# Patient Record
Sex: FEMALE | Race: WHITE | NOT HISPANIC OR LATINO | Employment: OTHER | URBAN - METROPOLITAN AREA
[De-identification: names, ages, dates, MRNs, and addresses within clinical notes are randomized per-mention and may not be internally consistent; named-entity substitution may affect disease eponyms.]

---

## 2017-03-21 ENCOUNTER — HOSPITAL ENCOUNTER (OUTPATIENT)
Dept: RADIOLOGY | Facility: HOSPITAL | Age: 62
Discharge: HOME | End: 2017-03-21
Attending: OBSTETRICS & GYNECOLOGY
Payer: COMMERCIAL

## 2017-03-21 DIAGNOSIS — Z12.31 SCREENING MAMMOGRAM, ENCOUNTER FOR: ICD-10-CM

## 2017-03-21 PROCEDURE — 77067 SCR MAMMO BI INCL CAD: CPT | Mod: TC

## 2017-04-05 ENCOUNTER — TELEPHONE (OUTPATIENT)
Dept: GASTROENTEROLOGY | Facility: CLINIC | Age: 62
End: 2017-04-05

## 2017-04-05 NOTE — TELEPHONE ENCOUNTER
Spoke with patient today by phone.  Overall, she feels well, but is still concerned about the flat, pencil thin stools that she has had for the past few weeks.  Last colonoscopy occurred in 2014, showing only diverticulosis as the main finding.    The patient reports no change in medications, but does indicate that she has altered her diet somewhat because of Lent.  She continues to have her normal bowel pattern of 2 bowel movements every morning.  They're not significantly hard to pass, but are different shape as noted above.  There is no associated bright red blood per rectum or melena.  She still has occasional left lower quadrant discomfort, which is well controlled with when necessary use of dicyclomine.    Recommended that the patient try Benefiber daily in association with the MiraLAX she is already on for bulking of the stool, and to see if that will alter the stool pattern.  If the symptoms persists beyond Lent, recommend that she follow up with me, and we'll discuss colonoscopic evaluation at that time.    Yunier Bravo MD, FACP, FACG, AGAF [facility]  Cell: [phone]  [address], [address], [place] [address]  Phone: [phone] Fax: [phone] [address], [address], [place] [address]  Phone: [phone] Fax: [phone]

## 2017-05-04 DIAGNOSIS — Z00.00 ROUTINE GENERAL MEDICAL EXAMINATION AT A HEALTH CARE FACILITY: Primary | ICD-10-CM

## 2017-07-10 ENCOUNTER — PATIENT MESSAGE (OUTPATIENT)
Dept: SURGERY | Facility: CLINIC | Age: 62
End: 2017-07-10

## 2017-07-11 DIAGNOSIS — J01.00 ACUTE MAXILLARY SINUSITIS, RECURRENCE NOT SPECIFIED: ICD-10-CM

## 2017-07-11 RX ORDER — AMOXICILLIN AND CLAVULANATE POTASSIUM 875; 125 MG/1; MG/1
1 TABLET, FILM COATED ORAL EVERY 12 HOURS
Qty: 20 TABLET | Refills: 0 | Status: SHIPPED | OUTPATIENT
Start: 2017-07-11 | End: 2018-08-16

## 2017-07-27 ENCOUNTER — HOSPITAL ENCOUNTER (OUTPATIENT)
Dept: RADIOLOGY | Facility: HOSPITAL | Age: 62
Discharge: HOME | End: 2017-07-27
Attending: NURSE PRACTITIONER
Payer: COMMERCIAL

## 2017-07-27 ENCOUNTER — TELEPHONE (OUTPATIENT)
Dept: FAMILY MEDICINE | Facility: CLINIC | Age: 62
End: 2017-07-27

## 2017-07-27 ENCOUNTER — OFFICE VISIT (OUTPATIENT)
Dept: FAMILY MEDICINE | Facility: CLINIC | Age: 62
End: 2017-07-27
Payer: COMMERCIAL

## 2017-07-27 VITALS
HEIGHT: 62 IN | DIASTOLIC BLOOD PRESSURE: 70 MMHG | TEMPERATURE: 98 F | WEIGHT: 140.88 LBS | SYSTOLIC BLOOD PRESSURE: 118 MMHG | BODY MASS INDEX: 25.92 KG/M2 | HEART RATE: 81 BPM | OXYGEN SATURATION: 99 %

## 2017-07-27 DIAGNOSIS — M79.89 PAIN AND SWELLING OF LEFT LOWER LEG: ICD-10-CM

## 2017-07-27 DIAGNOSIS — M79.89 PAIN AND SWELLING OF LEFT LOWER LEG: Primary | ICD-10-CM

## 2017-07-27 DIAGNOSIS — M79.662 PAIN AND SWELLING OF LEFT LOWER LEG: ICD-10-CM

## 2017-07-27 DIAGNOSIS — M79.662 PAIN AND SWELLING OF LEFT LOWER LEG: Primary | ICD-10-CM

## 2017-07-27 PROCEDURE — 99213 OFFICE O/P EST LOW 20 MIN: CPT | Mod: S$GLB,,, | Performed by: NURSE PRACTITIONER

## 2017-07-27 PROCEDURE — 93971 EXTREMITY STUDY: CPT | Mod: TC,PO

## 2017-07-27 RX ORDER — HYDROCHLOROTHIAZIDE 25 MG/1
25 TABLET ORAL DAILY
Qty: 30 TABLET | Refills: 0 | Status: SHIPPED | OUTPATIENT
Start: 2017-07-27 | End: 2017-08-24 | Stop reason: SDUPTHER

## 2017-07-27 NOTE — TELEPHONE ENCOUNTER
Spoke with patient let her know US negative for DVT sending HCTZ to the pharmacy take for 5 days elevate low salt

## 2017-07-27 NOTE — PROGRESS NOTES
Subjective:       Patient ID: Mone Best is a 61 y.o. female.    Chief Complaint: Leg Swelling (left leg swelling and heaviness)    Edema   This is a new problem. 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 The problem occurs constantly. The problem has been unchanged. Pertinent negatives include no abdominal pain, anorexia, arthralgias, change in bowel habit, chest pain, chills, congestion, coughing, diaphoresis, fatigue, fever, headaches, joint swelling, myalgias, nausea, neck pain, numbness, rash, sore throat, swollen glands, urinary symptoms, vertigo, visual change, vomiting or weakness. Exacerbated by: heaviness, warmth. She has tried nothing for the symptoms.     Review of Systems   Constitutional: Negative for chills, diaphoresis, fatigue and fever.   HENT: Negative for congestion and sore throat.    Eyes: Negative for photophobia and visual disturbance.   Respiratory: Negative for cough, chest tightness, shortness of breath and wheezing.    Cardiovascular: Positive for leg swelling. Negative for chest pain and palpitations.        Heaviness left lower leg     Gastrointestinal: Negative for abdominal pain, anorexia, change in bowel habit, nausea and vomiting.   Musculoskeletal: Negative for arthralgias, joint swelling, myalgias and neck pain.   Skin: Negative for rash.   Neurological: Negative for vertigo, weakness, numbness and headaches.       Objective:      Physical Exam   Constitutional: She appears well-developed and well-nourished. No distress.   HENT:   Right Ear: External ear normal.   Left Ear: External ear normal.   Cardiovascular: Normal rate, regular rhythm and normal heart sounds.    Pulmonary/Chest: Effort normal and breath sounds normal. No respiratory distress.   Musculoskeletal: She exhibits edema and tenderness. She exhibits no deformity.        Left lower leg: She exhibits tenderness and swelling.        Left  foot: There is swelling.   + homans sign left side, mild erythema, mild warmth     Skin: Skin is warm and dry. No rash noted. She is not diaphoretic. No erythema. No pallor.   Psychiatric: She has a normal mood and affect. Her behavior is normal. Judgment and thought content normal.   Vitals reviewed.      Assessment:       1. Pain and swelling of left lower leg        Plan:       Pain and swelling of left lower leg  -     US Lower Extremity Veins Left; Future      Ultrasound scheduled for today at 345

## 2017-08-16 ENCOUNTER — CLINICAL SUPPORT (OUTPATIENT)
Dept: INTERNAL MEDICINE | Facility: CLINIC | Age: 62
End: 2017-08-16
Payer: COMMERCIAL

## 2017-08-16 ENCOUNTER — OFFICE VISIT (OUTPATIENT)
Dept: INTERNAL MEDICINE | Facility: CLINIC | Age: 62
End: 2017-08-16
Payer: COMMERCIAL

## 2017-08-16 ENCOUNTER — HOSPITAL ENCOUNTER (OUTPATIENT)
Dept: CARDIOLOGY | Facility: CLINIC | Age: 62
Discharge: HOME | End: 2017-08-16
Payer: COMMERCIAL

## 2017-08-16 DIAGNOSIS — Z00.00 ROUTINE GENERAL MEDICAL EXAMINATION AT A HEALTH CARE FACILITY: Primary | ICD-10-CM

## 2017-08-16 DIAGNOSIS — Z00.00 ROUTINE GENERAL MEDICAL EXAMINATION AT A HEALTH CARE FACILITY: ICD-10-CM

## 2017-08-16 LAB
ALBUMIN SERPL BCP-MCNC: 3.8 G/DL
ALP SERPL-CCNC: 43 U/L
ALT SERPL W/O P-5'-P-CCNC: 9 U/L
ANION GAP SERPL CALC-SCNC: 6 MMOL/L
AST SERPL-CCNC: 9 U/L
BILIRUB SERPL-MCNC: 0.5 MG/DL
BUN SERPL-MCNC: 14 MG/DL
CALCIUM SERPL-MCNC: 9.4 MG/DL
CHLORIDE SERPL-SCNC: 107 MMOL/L
CHOLEST/HDLC SERPL: 4.1 {RATIO}
CO2 SERPL-SCNC: 30 MMOL/L
CREAT SERPL-MCNC: 0.8 MG/DL
ERYTHROCYTE [DISTWIDTH] IN BLOOD BY AUTOMATED COUNT: 13 %
EST. GFR  (AFRICAN AMERICAN): >60 ML/MIN/1.73 M^2
EST. GFR  (NON AFRICAN AMERICAN): >60 ML/MIN/1.73 M^2
ESTIMATED AVG GLUCOSE: 91 MG/DL
GLUCOSE SERPL-MCNC: 98 MG/DL
HBA1C MFR BLD HPLC: 4.8 %
HCT VFR BLD AUTO: 40.2 %
HDL/CHOLESTEROL RATIO: 24.5 %
HDLC SERPL-MCNC: 188 MG/DL
HDLC SERPL-MCNC: 46 MG/DL
HGB BLD-MCNC: 13.8 G/DL
LDLC SERPL CALC-MCNC: 115.6 MG/DL
MCH RBC QN AUTO: 29.5 PG
MCHC RBC AUTO-ENTMCNC: 34.3 G/DL
MCV RBC AUTO: 86 FL
NONHDLC SERPL-MCNC: 142 MG/DL
PLATELET # BLD AUTO: 272 K/UL
PMV BLD AUTO: 9.4 FL
POTASSIUM SERPL-SCNC: 4.5 MMOL/L
PROT SERPL-MCNC: 6.7 G/DL
RBC # BLD AUTO: 4.68 M/UL
SODIUM SERPL-SCNC: 143 MMOL/L
TRIGL SERPL-MCNC: 132 MG/DL
TSH SERPL DL<=0.005 MIU/L-ACNC: 2.67 UIU/ML
WBC # BLD AUTO: 7.72 K/UL

## 2017-08-16 PROCEDURE — 93000 ELECTROCARDIOGRAM COMPLETE: CPT | Mod: S$GLB,,, | Performed by: INTERNAL MEDICINE

## 2017-08-16 PROCEDURE — 85027 COMPLETE CBC AUTOMATED: CPT

## 2017-08-16 PROCEDURE — 80053 COMPREHEN METABOLIC PANEL: CPT

## 2017-08-16 PROCEDURE — 83036 HEMOGLOBIN GLYCOSYLATED A1C: CPT

## 2017-08-16 PROCEDURE — 99396 PREV VISIT EST AGE 40-64: CPT | Mod: S$GLB,,, | Performed by: INTERNAL MEDICINE

## 2017-08-16 PROCEDURE — 36415 COLL VENOUS BLD VENIPUNCTURE: CPT

## 2017-08-16 PROCEDURE — 80061 LIPID PANEL: CPT

## 2017-08-16 PROCEDURE — 97750 PHYSICAL PERFORMANCE TEST: CPT | Mod: S$GLB,,, | Performed by: INTERNAL MEDICINE

## 2017-08-16 PROCEDURE — 97802 MEDICAL NUTRITION INDIV IN: CPT | Mod: S$GLB,,, | Performed by: INTERNAL MEDICINE

## 2017-08-16 PROCEDURE — 84443 ASSAY THYROID STIM HORMONE: CPT

## 2017-08-16 PROCEDURE — 99999 PR PBB SHADOW E&M-EST. PATIENT-LVL II: CPT | Mod: PBBFAC,,, | Performed by: INTERNAL MEDICINE

## 2017-08-16 NOTE — PROGRESS NOTES
Nutrition Assessment  Client name:  Mone Best  :  1955  Age:  61 y.o.  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 Additionally last month she was on vacation in [place] and recently on 3 day retreat with service of 3 meals daily. Also has been entertaining and eating dessert and of course the leftovers. So, her usual healthy eating patterns have been off track lately. Her labs are higher than last year, but still good and she is not surprised and frankly expected this. In past when she gave up chocolate and cheese for 6 wks she last 5#. She is careful about eating nuts infrequently due to diverticulitis. Drinking water is a challenge as she has to have facilities available and when traveling on the road with [employer], and does not think she can tackle this at this time, and already wears poise pads.. She has history of Diverticulosis/constipation/Thyroid disease all which is well controlled with prescription medications. She has a family history of Dementia and DM and Podiapn has been prescribed for that reason. She is interested in losing 5#, has questions about preparation of healthy cajun dishes.      Past Medical History:   Diagnosis Date    Constipation, chronic     Diverticulosis     Glucose intolerance (impaired glucose tolerance) 5/3/2013    Hyperlipidemia 5/3/2013    Osteopenia     PSVT (paroxysmal supraventricular tachycardia)     Stress incontinence     Thyroid disease        Social History    Marital status:    Employment: Retired RN -  retired from [employer]    Social History   Substance Use Topics    Smoking status: Never Smoker    Smokeless tobacco: Never Used     "Alcohol use 3 glasses wine per wk        Current medications:  has a current medication list which includes the following prescription(s): 5-hydroxytryptophan (5-htp), amoxicillin-clavulanate 875-125mg, cyanocobalamin/cobamamide, dicyclomine, estradiol, fish oil-omega-3 fatty acids, hydrochlorothiazide, l.acid-b.bifidum-b.animal-fos, levocarnitine tartrate, levothyroxine, multivitamin, podiapn, polyethylene glycol, turmeric, and vitamin d.  Vitamins, minerals, and/or supplements:  As above   Food allergies or intolerances:  Intolerance to nuts - Diverticulitis     Food History  Breakfast:  1 c. Coffee with light vanilla soy milk, high protein cheerios, hard boiled egg 1/2 multi grain bagel with Laughing cow cheese  Mid-morning Snack:  none  Lunch:  1/2 sandwich on whole wheat with turkey/cheese, carrots/hummus/pickle, lentil chips or Beanitos, apple  Mid-afternoon Snack: 1 c. decaf coffee + chocolate chip cookie  Dinner:  Clint with couscous and carrots  H.S. Snack:  chocolate chip cookie  Dining out: 1x/wk    Exercise History:  Aerobic and resistance training total 1 hr 3x/wk    Lab Reports   Total Cholesterol:  188    Triglycerides:  132  HDL:  46  LDL:  115.6   Glucose:  98  HbA1c:  4.8  BP:  118/82     Weight History  Height:  5'4"     Weight:  137  BMI:  23.51  % Body Fat:  34.18    Diagnosis  RMR (Method:  Body Wood):  1100 kcal  Activity Factor:  1.2  TAYLOR:  1430    No nutrition diagnosis at this time.    Intervention    Goals:  1.  Increase fish to 2x/wk - salmon + another type  2.  Switch white pasta to whole grains, multi grain or bean pasta  3.  Sub slice of turkey for dessert  4.  Continue exercise  5.  Continue with healthy lipids    Reviewed lab values and congratulated client on healthy results. Discussed the benefits of whole grain or multi grain pasta. Explained how sugar spikes are related to fat deposition and reducing sweets will assist with thickness around wt. And promote wt. Loss. Shared a " recipe for Watermelon pizza as a creative idea for entertaining. Introduced client to cookbook of healthy Cajun dishes and oil free browned babak in jar. Answered questions about cacao content of dark chocolate for healthy benefits. Would benefit from increased water but unable to focus on this at this time. Client assisted with development of these goals and is receptive to accomplishment.    Handouts provided:  Meal Planning Guide  Restaurant Guide  Eat Fit Shopping List  Eat Fit Isa  Fast Food Guide  Vitamin/Mineral Guide    Monitoring/Evaluation    Monitor the following:  Weight  BMI  % Body Fat  Caloric intake  Labs:  CMP/Lipids    Follow Up Plan:  Follow up with client in 1-2 years

## 2017-08-18 NOTE — PROGRESS NOTES
Subjective:       Patient ID: Mone Best is a 61 y.o. female.    Chief Complaint: No chief complaint on file.    HPI   Patient has reported no previous history of cardiovascular or pulmonary disease. She has reported that her shoulders have been hurting but is not physically limited by this.     Mrs. Best has just returned from [place]. Her  took her to see the [employer]. She has not been exercising as much as usual because she has been babysitting and sick. She plans to exercise more regularly.     Current Exercise Routine:   - Cardio 30 minutes 3 days per week (curvey treadmill, bike, elliptical)   - Weight machines - no more free weights    - Captain's chair and crunch assist for abdominal muscles    Review of Systems    Objective:      The fitness evaluation results are as follows:    D.O.S. 8/16/2017 9/7/2016   Height (in): 64 64   Weight (lbs): 137 140   BMI: 23.11172 24.235665   Body Fat (%): 34.18 31.71   Waist (cm): 80 76   Hip (cm): 95 95   WHR: 0.84 0.80   RBP (mmHg): 118/82 122/78   RHR (bpm): 62 52    Strength R (lbs)t: 49.77671 51    Strength Lt (lbs): 35.51298 40   Push-up Assessment: 21 25   Curl-up Assessment: 33 30   Flexibility Testing (cm): 30 40   REE (kcals): 1100 1200     Physical Exam    Assessment:      Age/Gender Stratified Assessment:     Resting BP: Within Testing Limits   Body Fat %: Fair   WHR Risk Factor: Moderate Risk    Strength R: Average    Strength L: Below Average   Upper Body Endurance: Excellent   Abdominal Endurance: Above Average   Lower body Flexibility: Good     1. Routine general medical examination at a health care facility        Plan:    It was recommended for Mrs. Best to begin performing exercise on the arm bike (3 minutes each direction to start). This will help strengthen her shoulders and upper back to help reduce pain and tightness being felt. She should continue with her regular exercise routine to continue  reaching recommended guidelines but free weight exercises should be performed occasionally to challenge the body and help increase overall strength. Daily stretching should also be a focus to help increase range of motion. There was a significant decrease that we do not want to happen again next visit.     Recommended Fitness Guidelines:   - 150 minutes of moderate intensity aerobic exercise each week OR 75 minutes of vigorous    - 2-4 days per week of resistance training for each muscle group   - Daily stretching

## 2017-08-27 RX ORDER — HYDROCHLOROTHIAZIDE 25 MG/1
25 TABLET ORAL DAILY
Qty: 90 TABLET | Refills: 3 | Status: SHIPPED | OUTPATIENT
Start: 2017-08-27 | End: 2018-08-16

## 2017-09-01 VITALS — DIASTOLIC BLOOD PRESSURE: 74 MMHG | HEART RATE: 68 BPM | SYSTOLIC BLOOD PRESSURE: 118 MMHG

## 2017-09-01 NOTE — PROGRESS NOTES
Subjective:       Patient ID: Mone Best is a 61 y.o. female.    Chief Complaint: Executive Health    HPIVery pleasant lady from [facility] here for her Executive Health exam. Overall doing well, but recently had bout of pain in her right lower quadrant of her abdomen. She has a history of diverticulosis that orin have contributed to this issue. She is now pain-free and I asked she obtain and make available to me outside records regarding her colonoscopies and other studies to include in her records. Otherwise doing well.   I am sending copies of her studies including blood work that was all within normal limits including EKG.  Review of Systems   Constitutional: Negative for activity change, appetite change, fatigue and unexpected weight change.   HENT: Negative.    Eyes: Negative for visual disturbance.   Respiratory: Negative for cough, shortness of breath and wheezing.    Cardiovascular: Negative for chest pain, palpitations and leg swelling.   Gastrointestinal: Negative for abdominal distention, abdominal pain and blood in stool.   Endocrine: Negative.    Genitourinary: Negative for difficulty urinating.   Musculoskeletal: Negative for arthralgias and joint swelling.   Skin: Negative.    Neurological: Negative for dizziness, weakness, light-headedness, numbness and headaches.       Objective:      Physical Exam   Constitutional: She is oriented to person, place, and time. She appears well-developed and well-nourished. No distress.   HENT:   Head: Normocephalic and atraumatic.   Right Ear: External ear normal.   Left Ear: External ear normal.   Mouth/Throat: Oropharynx is clear and moist. No oropharyngeal exudate.   Eyes: Conjunctivae and EOM are normal. Pupils are equal, round, and reactive to light. Right eye exhibits no discharge. Left eye exhibits no discharge. No scleral icterus.   Neck: Normal range of motion. Neck supple.   Cardiovascular: Normal rate, regular rhythm, normal heart sounds and intact  distal pulses.    Pulmonary/Chest: Effort normal and breath sounds normal. No respiratory distress. She has no wheezes. She exhibits no tenderness.   Abdominal: Soft. Bowel sounds are normal. She exhibits no distension and no mass. There is no tenderness.   Musculoskeletal: Normal range of motion. She exhibits no edema.   Neurological: She is alert and oriented to person, place, and time. No cranial nerve deficit. Coordination normal.   Skin: Skin is warm. She is not diaphoretic. No erythema.   Psychiatric: She has a normal mood and affect. Her behavior is normal. Judgment and thought content normal.   Nursing note and vitals reviewed.      Assessment:       1. Routine general medical examination at a health care facility     2.    History of diverticulosis - probable bout or diverticulitis as reported.   3.    Chronic medical problems - stable.   Plan:    1. Continue with current medications.         2. Obtain outside records regarding GI studies/colonoscopy history.         3. Return to clinic in 1 year or sooner if needed.

## 2017-12-07 ENCOUNTER — TELEPHONE (OUTPATIENT)
Dept: FAMILY MEDICINE | Facility: CLINIC | Age: 62
End: 2017-12-07

## 2017-12-07 ENCOUNTER — PATIENT MESSAGE (OUTPATIENT)
Dept: FAMILY MEDICINE | Facility: CLINIC | Age: 62
End: 2017-12-07

## 2017-12-07 RX ORDER — PROMETHAZINE HYDROCHLORIDE 25 MG/1
25 SUPPOSITORY RECTAL EVERY 6 HOURS PRN
Qty: 12 SUPPOSITORY | Refills: 1 | Status: SHIPPED | OUTPATIENT
Start: 2017-12-07 | End: 2018-08-16

## 2017-12-07 NOTE — TELEPHONE ENCOUNTER
Mone is nauseous and got sick last night. Can you order her Phergan suppository @ [facility]?

## 2018-01-26 DIAGNOSIS — Z20.828 EXPOSURE TO INFLUENZA: Primary | ICD-10-CM

## 2018-01-26 DIAGNOSIS — Z20.828 EXPOSURE TO INFLUENZA: ICD-10-CM

## 2018-01-26 RX ORDER — OSELTAMIVIR PHOSPHATE 75 MG/1
75 CAPSULE ORAL DAILY
Qty: 10 CAPSULE | Refills: 0 | Status: SHIPPED | OUTPATIENT
Start: 2018-01-26 | End: 2018-01-31

## 2018-01-26 RX ORDER — OSELTAMIVIR PHOSPHATE 75 MG/1
75 CAPSULE ORAL DAILY
Qty: 5 CAPSULE | Refills: 0 | Status: SHIPPED | OUTPATIENT
Start: 2018-01-26 | End: 2018-01-26 | Stop reason: SDUPTHER

## 2018-02-16 DIAGNOSIS — Z12.31 VISIT FOR SCREENING MAMMOGRAM: Primary | ICD-10-CM

## 2018-03-22 ENCOUNTER — HOSPITAL ENCOUNTER (OUTPATIENT)
Dept: RADIOLOGY | Facility: HOSPITAL | Age: 63
Discharge: HOME | End: 2018-03-22
Attending: OBSTETRICS & GYNECOLOGY
Payer: COMMERCIAL

## 2018-03-22 DIAGNOSIS — Z12.31 VISIT FOR SCREENING MAMMOGRAM: ICD-10-CM

## 2018-03-22 PROCEDURE — 77067 SCR MAMMO BI INCL CAD: CPT | Mod: TC,PO

## 2018-05-30 DIAGNOSIS — Z00.00 ROUTINE GENERAL MEDICAL EXAMINATION AT A HEALTH CARE FACILITY: Primary | ICD-10-CM

## 2018-08-16 ENCOUNTER — OFFICE VISIT (OUTPATIENT)
Dept: FAMILY MEDICINE | Facility: CLINIC | Age: 63
End: 2018-08-16
Payer: COMMERCIAL

## 2018-08-16 VITALS
OXYGEN SATURATION: 100 % | DIASTOLIC BLOOD PRESSURE: 72 MMHG | HEIGHT: 62 IN | SYSTOLIC BLOOD PRESSURE: 128 MMHG | HEART RATE: 66 BPM | TEMPERATURE: 98 F | WEIGHT: 142.13 LBS | BODY MASS INDEX: 26.16 KG/M2

## 2018-08-16 DIAGNOSIS — J30.89 NON-SEASONAL ALLERGIC RHINITIS DUE TO OTHER ALLERGIC TRIGGER: Primary | ICD-10-CM

## 2018-08-16 PROCEDURE — 96372 THER/PROPH/DIAG INJ SC/IM: CPT | Mod: S$GLB,,, | Performed by: NURSE PRACTITIONER

## 2018-08-16 PROCEDURE — 3008F BODY MASS INDEX DOCD: CPT | Mod: CPTII,S$GLB,, | Performed by: NURSE PRACTITIONER

## 2018-08-16 PROCEDURE — 99213 OFFICE O/P EST LOW 20 MIN: CPT | Mod: 25,S$GLB,, | Performed by: NURSE PRACTITIONER

## 2018-08-16 RX ORDER — LIOTHYRONINE SODIUM 5 UG/1
TABLET ORAL
COMMUNITY
Start: 2018-07-12 | End: 2018-09-13

## 2018-08-16 RX ORDER — TRIAMCINOLONE ACETONIDE 40 MG/ML
80 INJECTION, SUSPENSION INTRA-ARTICULAR; INTRAMUSCULAR
Status: COMPLETED | OUTPATIENT
Start: 2018-08-16 | End: 2018-08-16

## 2018-08-16 RX ORDER — LORATADINE 10 MG/1
10 TABLET ORAL DAILY
Qty: 30 TABLET | Refills: 0 | Status: SHIPPED | OUTPATIENT
Start: 2018-08-16 | End: 2020-03-10

## 2018-08-16 RX ADMIN — TRIAMCINOLONE ACETONIDE 80 MG: 40 INJECTION, SUSPENSION INTRA-ARTICULAR; INTRAMUSCULAR at 12:08

## 2018-08-16 NOTE — PROGRESS NOTES
Subjective:       Patient ID: Mone Best is a 62 y.o. female.    Chief Complaint: scratchy throat, runny nose, chest congestion, ear pain and bloating, gas, frequent bowel movements    Sinus Problem   This is a new problem. The current episode started in the past 7 days (tuesday). The problem is unchanged. There has been no fever. Her pain is at a severity of 0/10. She is experiencing no pain. Associated symptoms include congestion, coughing, ear pain, a hoarse voice, sneezing and a sore throat. Pertinent negatives include no chills, diaphoresis, headaches, neck pain, shortness of breath, sinus pressure or swollen glands. Treatments tried: flonase  The treatment provided no relief.     Review of Systems   Constitutional: Negative for chills, diaphoresis, fatigue and fever.   HENT: Positive for congestion, ear pain, hoarse voice, postnasal drip, rhinorrhea, sneezing, sore throat and voice change. Negative for sinus pressure.    Respiratory: Positive for cough. Negative for chest tightness, shortness of breath and wheezing.    Cardiovascular: Negative for chest pain, palpitations and leg swelling.   Musculoskeletal: Negative for neck pain.   Neurological: Negative for headaches.       Objective:      Physical Exam   Constitutional: She is oriented to person, place, and time. She appears well-developed and well-nourished. No distress.   HENT:   Right Ear: Tympanic membrane and external ear normal.   Left Ear: External ear normal. A middle ear effusion is present.   Nose: Mucosal edema and rhinorrhea present. Right sinus exhibits no maxillary sinus tenderness and no frontal sinus tenderness. Left sinus exhibits no maxillary sinus tenderness and no frontal sinus tenderness.   Mouth/Throat: Posterior oropharyngeal erythema present. No oropharyngeal exudate or posterior oropharyngeal edema.   Cardiovascular: Normal rate, regular rhythm and normal heart sounds.   No murmur heard.  Pulmonary/Chest: Effort normal and  breath sounds normal. No stridor. No respiratory distress.   Neurological: She is alert and oriented to person, place, and time.   Skin: Skin is warm and dry. She is not diaphoretic.   Psychiatric: She has a normal mood and affect. Her behavior is normal. Judgment and thought content normal.   Vitals reviewed.      Assessment:       1. Non-seasonal allergic rhinitis due to other allergic trigger        Plan:       Non-seasonal allergic rhinitis due to other allergic trigger  -     triamcinolone acetonide injection 80 mg; Inject 2 mLs (80 mg total) into the muscle one time.  -     loratadine (CLARITIN) 10 mg tablet; Take 1 tablet (10 mg total) by mouth once daily.  Dispense: 30 tablet; Refill: 0    continue Flonase

## 2018-09-13 ENCOUNTER — CLINICAL SUPPORT (OUTPATIENT)
Dept: INTERNAL MEDICINE | Facility: CLINIC | Age: 63
End: 2018-09-13
Payer: COMMERCIAL

## 2018-09-13 ENCOUNTER — OFFICE VISIT (OUTPATIENT)
Dept: INTERNAL MEDICINE | Facility: CLINIC | Age: 63
End: 2018-09-13
Payer: COMMERCIAL

## 2018-09-13 ENCOUNTER — HOSPITAL ENCOUNTER (OUTPATIENT)
Dept: CARDIOLOGY | Facility: CLINIC | Age: 63
Discharge: HOME | End: 2018-09-13
Payer: COMMERCIAL

## 2018-09-13 DIAGNOSIS — Z00.00 ROUTINE GENERAL MEDICAL EXAMINATION AT A HEALTH CARE FACILITY: Primary | ICD-10-CM

## 2018-09-13 DIAGNOSIS — Z00.00 ROUTINE GENERAL MEDICAL EXAMINATION AT A HEALTH CARE FACILITY: ICD-10-CM

## 2018-09-13 LAB
25(OH)D3+25(OH)D2 SERPL-MCNC: 85 NG/ML
ALBUMIN SERPL BCP-MCNC: 3.7 G/DL
ALP SERPL-CCNC: 39 U/L
ALT SERPL W/O P-5'-P-CCNC: 10 U/L
ANION GAP SERPL CALC-SCNC: 8 MMOL/L
AST SERPL-CCNC: 9 U/L
BILIRUB SERPL-MCNC: 0.4 MG/DL
BUN SERPL-MCNC: 17 MG/DL
CALCIUM SERPL-MCNC: 9.5 MG/DL
CHLORIDE SERPL-SCNC: 107 MMOL/L
CHOLEST SERPL-MCNC: 173 MG/DL
CHOLEST/HDLC SERPL: 3.4 {RATIO}
CO2 SERPL-SCNC: 26 MMOL/L
CREAT SERPL-MCNC: 0.7 MG/DL
DIASTOLIC DYSFUNCTION: NO
ERYTHROCYTE [DISTWIDTH] IN BLOOD BY AUTOMATED COUNT: 12.6 %
EST. GFR  (AFRICAN AMERICAN): >60 ML/MIN/1.73 M^2
EST. GFR  (NON AFRICAN AMERICAN): >60 ML/MIN/1.73 M^2
ESTIMATED AVG GLUCOSE: 91 MG/DL
GLUCOSE SERPL-MCNC: 97 MG/DL
HBA1C MFR BLD HPLC: 4.8 %
HCT VFR BLD AUTO: 40.7 %
HDLC SERPL-MCNC: 51 MG/DL
HDLC SERPL: 29.5 %
HGB BLD-MCNC: 13.6 G/DL
LDLC SERPL CALC-MCNC: 109.8 MG/DL
MCH RBC QN AUTO: 30.4 PG
MCHC RBC AUTO-ENTMCNC: 33.4 G/DL
MCV RBC AUTO: 91 FL
NONHDLC SERPL-MCNC: 122 MG/DL
PLATELET # BLD AUTO: 277 K/UL
PMV BLD AUTO: 9.3 FL
POTASSIUM SERPL-SCNC: 4.6 MMOL/L
PROT SERPL-MCNC: 6.5 G/DL
RBC # BLD AUTO: 4.48 M/UL
SODIUM SERPL-SCNC: 141 MMOL/L
TRIGL SERPL-MCNC: 61 MG/DL
TSH SERPL DL<=0.005 MIU/L-ACNC: 2.39 UIU/ML
WBC # BLD AUTO: 8.3 K/UL

## 2018-09-13 PROCEDURE — 99999 PR PBB SHADOW E&M-EST. PATIENT-LVL III: CPT | Mod: PBBFAC,,, | Performed by: INTERNAL MEDICINE

## 2018-09-13 PROCEDURE — 80061 LIPID PANEL: CPT

## 2018-09-13 PROCEDURE — 93015 CV STRESS TEST SUPVJ I&R: CPT | Mod: S$GLB,,, | Performed by: INTERNAL MEDICINE

## 2018-09-13 PROCEDURE — 99386 PREV VISIT NEW AGE 40-64: CPT | Mod: S$GLB,,, | Performed by: INTERNAL MEDICINE

## 2018-09-13 PROCEDURE — 85027 COMPLETE CBC AUTOMATED: CPT

## 2018-09-13 PROCEDURE — 82306 VITAMIN D 25 HYDROXY: CPT

## 2018-09-13 PROCEDURE — 97750 PHYSICAL PERFORMANCE TEST: CPT | Mod: S$GLB,,, | Performed by: INTERNAL MEDICINE

## 2018-09-13 PROCEDURE — 83036 HEMOGLOBIN GLYCOSYLATED A1C: CPT

## 2018-09-13 PROCEDURE — 84443 ASSAY THYROID STIM HORMONE: CPT

## 2018-09-13 PROCEDURE — 97802 MEDICAL NUTRITION INDIV IN: CPT | Mod: S$GLB,,, | Performed by: INTERNAL MEDICINE

## 2018-09-13 PROCEDURE — 80053 COMPREHEN METABOLIC PANEL: CPT

## 2018-09-13 NOTE — PROGRESS NOTES
"Nutrition Assessment  This is a general nutrition consultation as per the contractual agreement of the client employer's insurance provider.  Client requested via the  this am that it is her request to limit nutrition appointment to 30 minutes as she and her  have an important meeting at 1 pm.    Client name:  Mone Best  :  1955  Age:  62 y.o.  Gender:  female      Client acknowledges her previous request to limit nutrition session to 30 minutes. Shares that the results of her fitness evaluation were better than expected and had no idea she was doing that well. Her strength and flexibility have both improved in spite of thinking that her arms do not look as toned as last year. It is difficult with her busy schedule as a  + 3 grandchildren, to get to the gym 3 days per week but has been successful. Has extensive list of supplements (see below) that she takes in 2 intervals, and has discontinued the Magnesium as puzzling to her, it caused leg cramps. Has medical history of Diverticulitis and in the last 6 months with dedication to water consumption, daily Miralax, exercise and almonds limited to 2x/wk, she has been free of cramps. Changes from last visit entail no longer serving desserts when couples come to her home for counseling, does not purchase cookies and keep in her home, and due to sneezing reaction to wine, she discontinued drinking. Disappointed that [facility] no longer stocks the high protein Cheerios that she loves for breakfast. Very happy to learn of great numbers with her Lipid panel today and responds, "those are crazy numbers with such small changes. She loves pasta and her  El does not, however she is interested in types that are lower in calories.      Anthropometrics  Height:  5'4"     Weight:  137  BMI:  23.57  % Body Fat:  34.18    Clinical Signs/Symptoms  N/V/D:  none  Appetite (Good, Fair, or Poor):  good      Past " Medical History:   Diagnosis Date    Constipation, chronic     Diverticulosis     Glucose intolerance (impaired glucose tolerance) 5/3/2013    Hyperlipidemia 5/3/2013    Osteopenia     PSVT (paroxysmal supraventricular tachycardia)     Stress incontinence     Thyroid disease        Past Surgical History:   Procedure Laterality Date    APPENDECTOMY      BLADDER SUSPENSION      BREAST SURGERY      R breast biopsy- benign (removed fibroid adenoma)    COLONOSCOPY W/ BIOPSIES      polyps were negative    HYSTERECTOMY      OOPHORECTOMY      TONSILLECTOMY      VAGINAL HYSTERECTOMY W/ ANTERIOR AND POSTERIOR VAGINAL REPAIR         Medications    has a current medication list which includes the following prescription(s): cyanocobalamin/cobamamide, dicyclomine, estradiol, fish oil-omega-3 fatty acids, l.acid-b.bifidum-b.animal-fos, levocarnitine tartrate, levothyroxine, liothyronine, loratadine, multivitamin, podiapn, polyethylene glycol, turmeric, and vitamin d.    Vitamins, Minerals, and/or Supplements:  cyanocobalamin/cobamamide, fish oil-omega-3 fatty acids, l.acid-b.bifidum-b.animal-fos, levocarnitine tartrate, loratadine, multivitamin, turmeric, 5 HTP, zinc and vitamin d.     Food Allergies or Intolerances: wine causes sneezing     Social History    Marital status:    Employment:  Retired RN -  retired from [employer]    Social History     Tobacco Use    Smoking status: Never Smoker    Smokeless tobacco: Never Used   Substance Use Topics    Alcohol use: No        Lab Reports   Total Cholesterol: 173    Triglycerides:  61  HDL:  51  LDL:  109.8   Glucose:  97  HbA1c:  4.8  BP:  118/82  Vit D: 85     Food History  Breakfast:  Boiled egg, 1/2 WW english muffin with laughing cow cheese   Mid-morning Snack:  none  Lunch:  [employer] K[employer] bread with tuna pickle, apple  Mid-afternoon Snack:  Sometimes dark chocolate  Dinner:  Chicken or gibbs broil, vegetable or salad , quinoa or noodles  H.S. Snack:   Cup de cafe coffee  *Fluid intake:  Coffee, water    Exercise History:  Gym 3x/wk for 1 hr.+  1x/wk walking for 30 minutes    Cultural/Spiritual/Personal Preferences:  None identified    Support System:      State of Change:  action    Barriers to Change:  Time constraints     Diagnosis    No nutrition related diagnosis at this time.    Intervention    RMR (Method:  Body [place]):  1270 kcal  Activity Factor:  1.4  TAYLOR:  1778    Goals:  1. Maintain healthy lipid values  2. Continue with aerobic exercise  3. Continue limiting sweets and alcohol  4. Consider trial of bean/vegetable pasta    Nutrition Education  Reviewed labs results with client and complimented her on healthy improved results as well as limiting sweets and alcohol from last visit. Suggested that she visit Customer Service at [employer] and complete item, request form to have the cereal she likes placed in stock. Gave other brand names for healthy cereals and is interested in Kashi high protein. Explained that she can purchase a Spiralizer and make noodle strands from zucchini and yellow squash for excellent plate coverage, low calories and can be cooked in microwave oven. Also shared options of frozen lentil and pea pasta mixed with vegetables located in frozen food section of the market.    Patient verbalized understanding of nutrition education and recommendations received.    Handouts Provided  Meal Planning Guide  Restaurant Guide  Eat Fit Shopping List  Eat Fit Isa  Fast Food Guide  Vitamin/Mineral Guide    Monitoring/Evaluation    Monitor the following:  Weight  BMI  % Body Fat  Caloric intake  Labs:  CMP/Lipids    Follow Up Plan:  Follow up with client in 1-2 years

## 2018-09-13 NOTE — PROGRESS NOTES
Subjective:       Patient ID: Mone Best is a 62 y.o. female.    Chief Complaint: No chief complaint on file.    HPI   Patient has reported no previous history of cardiovascular or pulmonary disease.     Physical Limitations:   - Neck and back    - curl ups deferred    Current Exercise Routine:   - 30 - 35 minutes cardio 3 days per week    - curvy, elliptical, bike   - Upper/lower body weights 3 days per week    Patient now has 3 grandchildren. 2 boys and a girl. She visits 2 in [place] a lot but the new grandchild in [place] she does not see as often.     Patient believed she has significantly decreased in fitness level because of the way she has been feeling.     Review of Systems    Objective:      The fitness evaluation results are as follows:    D.O.S. 9/13/2018 8/16/2017 9/7/2016   Height (in): 63.5 64 64   Weight (lbs): 136 137 140   BMI: 23.8429486 23.772186 24.166900   Body Fat (%): 33.60 34.18 31.71   Waist (cm): 80 80 76   Hip (cm): 98 95 95   WHR: 0.82 0.84 0.80   RBP (mmHg): 112/72 118/82 122/78   RHR (bpm): 64 62 52    Strength R (lbs)t: 55 49.035856 51    Strength Lt (lbs): 39 35.543442 40   Push-up Assessment: 15 21 25   Curl-up Assessment: Deferred 33 30   Flexibility Testing (cm): 36 30 40   REE (kcals): 1270 1100 1200     Physical Exam    Assessment:      Age/Gender Stratified Assessment:     Resting BP: Within Testing Limits   Body Fat %: Good   WHR Risk Factor: Moderate Risk    Strength R: Average    Strength L: Average   Upper Body Endurance: Very Good   Abdominal Endurance: Deferred   Lower body Flexibility: Excellent     1. Routine general medical examination at a health care facility        Plan:    Patient should continue with regular exercise routine in order achieve below guidelines.     Recommended Fitness Guidelines:   - 150 minutes of moderate intensity aerobic exercise each week OR 75 minutes of vigorous    - 2-4 days per week of resistance training for each  muscle group   - Daily stretching

## 2018-09-24 VITALS
WEIGHT: 136 LBS | HEART RATE: 68 BPM | SYSTOLIC BLOOD PRESSURE: 110 MMHG | BODY MASS INDEX: 24.87 KG/M2 | DIASTOLIC BLOOD PRESSURE: 74 MMHG

## 2018-09-24 NOTE — PROGRESS NOTES
Subjective:       Patient ID: Mone Best is a 63 y.o. female.    Chief Complaint: Executive Health    HPIVery pleasant lady from [facility] here for her Executive Health exam. Overall doing well and had no specific complaints.  I am sending copies of her studies including blood work that was all unremarkable including a negative stress test.  Review of Systems   Constitutional: Negative for activity change, appetite change, fatigue and unexpected weight change.   HENT: Negative.    Eyes: Negative for visual disturbance.   Respiratory: Negative for cough, shortness of breath and wheezing.    Cardiovascular: Negative for chest pain, palpitations and leg swelling.   Gastrointestinal: Negative for abdominal distention, abdominal pain and blood in stool.   Genitourinary: Negative for difficulty urinating.   Musculoskeletal: Negative for arthralgias, back pain and joint swelling.   Skin: Negative.    Neurological: Negative for dizziness, weakness, light-headedness and headaches.   Hematological: Negative.        Objective:      Physical Exam   Constitutional: She appears well-developed and well-nourished. No distress.   HENT:   Head: Normocephalic and atraumatic.   Right Ear: External ear normal.   Left Ear: External ear normal.   Mouth/Throat: Oropharynx is clear and moist.   Eyes: Conjunctivae and EOM are normal. Pupils are equal, round, and reactive to light. Right eye exhibits no discharge. Left eye exhibits no discharge. No scleral icterus.   Neck: Normal range of motion. Neck supple. No JVD present. No thyromegaly present.   Cardiovascular: Normal rate, regular rhythm, normal heart sounds and intact distal pulses.   No murmur heard.  Pulmonary/Chest: Effort normal and breath sounds normal. No respiratory distress. She has no wheezes.   Abdominal: Soft. Bowel sounds are normal. She exhibits no distension. There is no tenderness.   Musculoskeletal: Normal range of motion. She exhibits no edema or tenderness.    Lymphadenopathy:     She has no cervical adenopathy.   Neurological: She is alert. No cranial nerve deficit. Coordination normal.   Skin: Skin is warm and dry. She is not diaphoretic. No erythema.   Psychiatric: She has a normal mood and affect. Her behavior is normal. Judgment and thought content normal.   Nursing note and vitals reviewed.      Assessment:       1. Routine general medical examination at a health care facility        Plan:    1. Return to clinic in 1 year or sooner if needed.

## 2018-11-28 ENCOUNTER — CLINICAL SUPPORT (OUTPATIENT)
Dept: FAMILY MEDICINE | Facility: CLINIC | Age: 63
End: 2018-11-28
Payer: COMMERCIAL

## 2018-11-28 DIAGNOSIS — Z23 FLU VACCINE NEED: Primary | ICD-10-CM

## 2018-11-28 PROCEDURE — 90471 IMMUNIZATION ADMIN: CPT | Mod: S$GLB,,, | Performed by: FAMILY MEDICINE

## 2018-11-28 PROCEDURE — 90686 IIV4 VACC NO PRSV 0.5 ML IM: CPT | Mod: S$GLB,,, | Performed by: FAMILY MEDICINE

## 2018-11-28 NOTE — PROGRESS NOTES
Patient here for flu vaccine, no pain noted. Injection given, tolerated well. Advised to wait in lobby 15 minutes. Report any adverse reactions. Given LD

## 2019-03-01 ENCOUNTER — TELEPHONE (OUTPATIENT)
Dept: FAMILY MEDICINE | Facility: CLINIC | Age: 64
End: 2019-03-01

## 2019-03-01 ENCOUNTER — PATIENT MESSAGE (OUTPATIENT)
Dept: FAMILY MEDICINE | Facility: CLINIC | Age: 64
End: 2019-03-01

## 2019-03-01 ENCOUNTER — TELEPHONE (OUTPATIENT)
Dept: DERMATOLOGY | Facility: CLINIC | Age: 64
End: 2019-03-01

## 2019-03-01 DIAGNOSIS — B36.0 TINEA VERSICOLOR: ICD-10-CM

## 2019-03-01 RX ORDER — KETOCONAZOLE 20 MG/G
CREAM TOPICAL 2 TIMES DAILY
Qty: 60 G | Refills: 3 | Status: SHIPPED | OUTPATIENT
Start: 2019-03-01 | End: 2020-03-10

## 2019-03-01 NOTE — TELEPHONE ENCOUNTER
----- Message from Paula Slade sent at 3/1/2019 11:02 AM CST -----  Contact: patient   [phone]-please call above patient at number in message need to find out what medication the doctor prescribed for her for a rash awhile back waiting on a call from the nurse thanks

## 2019-03-22 DIAGNOSIS — Z12.39 SCREENING BREAST EXAMINATION: Primary | ICD-10-CM

## 2019-04-29 ENCOUNTER — HOSPITAL ENCOUNTER (OUTPATIENT)
Dept: RADIOLOGY | Facility: HOSPITAL | Age: 64
Discharge: HOME | End: 2019-04-29
Attending: OBSTETRICS & GYNECOLOGY
Payer: COMMERCIAL

## 2019-04-29 DIAGNOSIS — Z12.39 SCREENING BREAST EXAMINATION: ICD-10-CM

## 2019-04-29 PROCEDURE — 77067 SCR MAMMO BI INCL CAD: CPT | Mod: TC,PO

## 2019-05-28 DIAGNOSIS — Z13.820 SCREENING FOR OSTEOPOROSIS: Primary | ICD-10-CM

## 2019-09-17 ENCOUNTER — OFFICE VISIT (OUTPATIENT)
Dept: INTERNAL MEDICINE | Facility: CLINIC | Age: 64
End: 2019-09-17
Payer: COMMERCIAL

## 2019-09-17 ENCOUNTER — CLINICAL SUPPORT (OUTPATIENT)
Dept: INTERNAL MEDICINE | Facility: CLINIC | Age: 64
End: 2019-09-17
Payer: COMMERCIAL

## 2019-09-17 ENCOUNTER — HOSPITAL ENCOUNTER (OUTPATIENT)
Dept: RADIOLOGY | Facility: CLINIC | Age: 64
Discharge: HOME | End: 2019-09-17
Attending: INTERNAL MEDICINE
Payer: COMMERCIAL

## 2019-09-17 VITALS
SYSTOLIC BLOOD PRESSURE: 108 MMHG | BODY MASS INDEX: 23.34 KG/M2 | DIASTOLIC BLOOD PRESSURE: 71 MMHG | HEIGHT: 64 IN | HEART RATE: 66 BPM

## 2019-09-17 DIAGNOSIS — Z13.820 SCREENING FOR OSTEOPOROSIS: ICD-10-CM

## 2019-09-17 DIAGNOSIS — Z00.00 ROUTINE GENERAL MEDICAL EXAMINATION AT A HEALTH CARE FACILITY: Primary | ICD-10-CM

## 2019-09-17 LAB
25(OH)D3+25(OH)D2 SERPL-MCNC: 89 NG/ML (ref 30–96)
ALBUMIN SERPL BCP-MCNC: 4.1 G/DL (ref 3.5–5.2)
ALP SERPL-CCNC: 44 U/L (ref 55–135)
ALT SERPL W/O P-5'-P-CCNC: 11 U/L (ref 10–44)
ANION GAP SERPL CALC-SCNC: 7 MMOL/L (ref 8–16)
AST SERPL-CCNC: 11 U/L (ref 10–40)
BILIRUB SERPL-MCNC: 0.4 MG/DL (ref 0.1–1)
BUN SERPL-MCNC: 17 MG/DL (ref 8–23)
CALCIUM SERPL-MCNC: 9.2 MG/DL (ref 8.7–10.5)
CHLORIDE SERPL-SCNC: 106 MMOL/L (ref 95–110)
CHOLEST SERPL-MCNC: 183 MG/DL (ref 120–199)
CHOLEST/HDLC SERPL: 3.9 {RATIO} (ref 2–5)
CO2 SERPL-SCNC: 27 MMOL/L (ref 23–29)
CREAT SERPL-MCNC: 0.7 MG/DL (ref 0.5–1.4)
ERYTHROCYTE [DISTWIDTH] IN BLOOD BY AUTOMATED COUNT: 12.7 % (ref 11.5–14.5)
EST. GFR  (AFRICAN AMERICAN): >60 ML/MIN/1.73 M^2
EST. GFR  (NON AFRICAN AMERICAN): >60 ML/MIN/1.73 M^2
ESTIMATED AVG GLUCOSE: 91 MG/DL (ref 68–131)
GLUCOSE SERPL-MCNC: 82 MG/DL (ref 70–110)
HBA1C MFR BLD HPLC: 4.8 % (ref 4–5.6)
HCT VFR BLD AUTO: 39.8 % (ref 37–48.5)
HDLC SERPL-MCNC: 47 MG/DL (ref 40–75)
HDLC SERPL: 25.7 % (ref 20–50)
HGB BLD-MCNC: 13.7 G/DL (ref 12–16)
LDLC SERPL CALC-MCNC: 111.2 MG/DL (ref 63–159)
MCH RBC QN AUTO: 30.4 PG (ref 27–31)
MCHC RBC AUTO-ENTMCNC: 34.4 G/DL (ref 32–36)
MCV RBC AUTO: 88 FL (ref 82–98)
NONHDLC SERPL-MCNC: 136 MG/DL
PLATELET # BLD AUTO: 283 K/UL (ref 150–350)
PMV BLD AUTO: 9.2 FL (ref 9.2–12.9)
POTASSIUM SERPL-SCNC: 3.9 MMOL/L (ref 3.5–5.1)
PROT SERPL-MCNC: 6.9 G/DL (ref 6–8.4)
RBC # BLD AUTO: 4.5 M/UL (ref 4–5.4)
SODIUM SERPL-SCNC: 140 MMOL/L (ref 136–145)
TRIGL SERPL-MCNC: 124 MG/DL (ref 30–150)
TSH SERPL DL<=0.005 MIU/L-ACNC: 0.99 UIU/ML (ref 0.4–4)
WBC # BLD AUTO: 7.9 K/UL (ref 3.9–12.7)

## 2019-09-17 PROCEDURE — 80061 LIPID PANEL: CPT

## 2019-09-17 PROCEDURE — 99999 PR PBB SHADOW E&M-EST. PATIENT-LVL III: CPT | Mod: PBBFAC,,, | Performed by: INTERNAL MEDICINE

## 2019-09-17 PROCEDURE — 77080 DXA BONE DENSITY AXIAL: CPT | Mod: 26,,, | Performed by: INTERNAL MEDICINE

## 2019-09-17 PROCEDURE — 36415 COLL VENOUS BLD VENIPUNCTURE: CPT

## 2019-09-17 PROCEDURE — 82306 VITAMIN D 25 HYDROXY: CPT

## 2019-09-17 PROCEDURE — 99386 PR PREVENTIVE VISIT,NEW,40-64: ICD-10-PCS | Mod: S$PBB,,, | Performed by: INTERNAL MEDICINE

## 2019-09-17 PROCEDURE — 80053 COMPREHEN METABOLIC PANEL: CPT

## 2019-09-17 PROCEDURE — 84443 ASSAY THYROID STIM HORMONE: CPT

## 2019-09-17 PROCEDURE — 85027 COMPLETE CBC AUTOMATED: CPT

## 2019-09-17 PROCEDURE — 99386 PREV VISIT NEW AGE 40-64: CPT | Mod: S$PBB,,, | Performed by: INTERNAL MEDICINE

## 2019-09-17 PROCEDURE — 77080 DXA BONE DENSITY AXIAL: CPT | Mod: TC

## 2019-09-17 PROCEDURE — 77080 DEXA BONE DENSITY SPINE HIP: ICD-10-PCS | Mod: 26,,, | Performed by: INTERNAL MEDICINE

## 2019-09-17 PROCEDURE — 83036 HEMOGLOBIN GLYCOSYLATED A1C: CPT

## 2019-09-17 PROCEDURE — 99999 PR PBB SHADOW E&M-EST. PATIENT-LVL III: ICD-10-PCS | Mod: PBBFAC,,, | Performed by: INTERNAL MEDICINE

## 2019-09-17 RX ORDER — LIOTHYRONINE SODIUM 5 UG/1
5 TABLET ORAL 2 TIMES DAILY
COMMUNITY
End: 2021-11-06 | Stop reason: SDUPTHER

## 2019-09-17 RX ORDER — PROGESTERONE 100 MG/1
100 CAPSULE ORAL DAILY
COMMUNITY
End: 2022-08-02 | Stop reason: ALTCHOICE

## 2019-09-17 NOTE — PROGRESS NOTES
"Subjective:       Patient ID: Mone Best is a 63 y.o. female.    Chief Complaint: No chief complaint on file.    HPI   Pt. Has no significant cardiovascular or pulmonary history.    Physical Limitations:  Patient experiences intermittent upper back, neck, and shoulder pain, which only limits her when exacerbated.  Patient chose to defer the curl-up and push-up test.  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      Current exercise routine:  Patient currently walks for 30-45 minutes, 2-3 days a week.  Patient goes to the gym 1-2 days a week where she ellipticals for 30 minutes followed by core and lower body resistance training exercises OR uses the "[employer]" aerobic machine for 30 minutes, followed by core and upper body resistance training exercises.  Patient does not follow any formal flexibility routine at the current time.    Goals:  Fun Facts:  Patient was friendly and engaged.  Patient stated that she has fallen out of her regular exercise routine due to external obligations.  Patient seemed disappointed with her current routine and her outcomes.  Patient seems motivated to lose weight and get back into her regular exercise routine.  Patient was receptive to all recommendations made.      Review of Systems    Objective:     The fitness evaluation results are as follows:  D.O.S. 9/17/2019 9/13/2018 8/16/2017 9/7/2016   Height (in): 64 63.5 64 64   Weight (lbs): 143 136 137 140   BMI: 24.684357 23.650082 23.839330 24.16623   Body Fat (%): 36.90 33.60 34.18 31.71   Waist (cm): 81 80 80 76   Hip (cm): 100 98 95 95   WHR: 0.81 0.82 0.84 0.80   RBP (mmHg): 126/76 112/72 118/82 122/78   RHR (bpm): 68 64 62 52    Strength R (lbs)t: 50 55 49.885387 51    Strength Lt (lbs): 33.508646 39 35.563781 40   Push-up Assessment: Deferred 15 21 25   Curl-up Assessment: Deferred Deferred 33 30   Flexibility Testing (cm): 22 36 30 40   REE (kcals): 1320 1270 1100 1200 "       Physical Exam    Assessment:     Age/gender stratified assessment:  Resting BP: Within Normal Limits   Body Fat %: Fair   WHR Risk Factor: Moderate Risk    Strength R: Average    Strength L: Below Average   Upper Body Endurance: Deferred   Abdominal Endurance: Deferred   Lower body Flexibiltiy: Needs Improvement       1. Routine general medical examination at a health care facility        Plan:       Recommended fitness guidelines:    -150 minutes of moderate intensity aerobic exercise per week or 75 minutes of vigorous intensity aerobic exercise per week.   Incorporate some interval training to increase your heart rate for short periods of time.      -2 to 4 days per week of resistance training for each muscle group.  Start to incorporate the core stabilization program, given during the evaluation.  Add one more day of upper and lower body resistance training exercises into your current routine by practicing your exercises at home.      -Daily stretching with a hold of at least 30 seconds per muscle group.  Practice the seated hamstring stretch, demonstrated during the evaluation, daily.

## 2019-09-19 VITALS
BODY MASS INDEX: 24.82 KG/M2 | WEIGHT: 144.63 LBS | HEART RATE: 68 BPM | DIASTOLIC BLOOD PRESSURE: 74 MMHG | SYSTOLIC BLOOD PRESSURE: 114 MMHG

## 2019-09-19 NOTE — PROGRESS NOTES
Subjective:       Patient ID: Mone Best is a 64 y.o. female.    Chief Complaint: Executive Health    HPIMiss Mone is a very pleasant lady from [place] here for her Executive Health exam. Overall doing well and had no specific complaints. She is active physically although has not had the time to exercise as before due to her busy schedule. She states that she will make time for this and all her activities. Otherwise doing well.  I am sending copies of her studies including blood work that was all unremarkable including negative stress test.  Additional study not available at the time of our visit included bone density study that showed changes consistent with osteopenia. There is no indication for treatment at this time, except calcium/vitamin D supplements and exercise. Repeat exam in 2 years for comparison.  Review of Systems   Constitutional: Negative for activity change, appetite change, fatigue and unexpected weight change.   HENT: Negative.    Eyes: Negative for visual disturbance.   Respiratory: Negative for cough and shortness of breath.    Cardiovascular: Negative for chest pain, palpitations and leg swelling.   Gastrointestinal: Negative for abdominal distention, abdominal pain and blood in stool.   Genitourinary: Negative for difficulty urinating.   Musculoskeletal: Negative for arthralgias, back pain and joint swelling.   Skin: Negative.    Neurological: Negative for dizziness, weakness, light-headedness and headaches.   Hematological: Negative.        Objective:      Physical Exam   Constitutional: She is oriented to person, place, and time. She appears well-developed and well-nourished. No distress.   HENT:   Head: Normocephalic and atraumatic.   Right Ear: External ear normal.   Left Ear: External ear normal.   Mouth/Throat: Oropharynx is clear and moist. No oropharyngeal exudate.   Eyes: Pupils are equal, round, and reactive to light. Conjunctivae and EOM are normal. Right eye exhibits no  discharge. Left eye exhibits no discharge. No scleral icterus.   Neck: Normal range of motion. Neck supple. No JVD present. No thyromegaly present.   Cardiovascular: Normal rate, regular rhythm, normal heart sounds and intact distal pulses.   No murmur heard.  Pulmonary/Chest: Effort normal and breath sounds normal. No respiratory distress. She has no wheezes. She exhibits no tenderness.   Abdominal: Soft. Bowel sounds are normal. She exhibits no distension and no mass. There is no tenderness.   Musculoskeletal: Normal range of motion. She exhibits no edema or tenderness.   Lymphadenopathy:     She has no cervical adenopathy.   Neurological: She is alert and oriented to person, place, and time. No cranial nerve deficit. Coordination normal.   Skin: Skin is warm and dry. No rash noted. She is not diaphoretic. No erythema.   Psychiatric: She has a normal mood and affect. Her behavior is normal. Judgment and thought content normal.   Nursing note and vitals reviewed.      Assessment:       1. Routine general medical examination at a health care facility        Plan:    1. Return to clinic in 1 year or sooner if needed.

## 2020-03-10 ENCOUNTER — OFFICE VISIT (OUTPATIENT)
Dept: FAMILY MEDICINE | Facility: CLINIC | Age: 65
End: 2020-03-10
Payer: COMMERCIAL

## 2020-03-10 VITALS
OXYGEN SATURATION: 96 % | HEIGHT: 64 IN | BODY MASS INDEX: 24.54 KG/M2 | HEART RATE: 76 BPM | SYSTOLIC BLOOD PRESSURE: 138 MMHG | DIASTOLIC BLOOD PRESSURE: 86 MMHG | TEMPERATURE: 97 F | WEIGHT: 143.75 LBS

## 2020-03-10 DIAGNOSIS — J02.9 ACUTE PHARYNGITIS, UNSPECIFIED ETIOLOGY: Primary | ICD-10-CM

## 2020-03-10 DIAGNOSIS — H66.91 RIGHT OTITIS MEDIA, UNSPECIFIED OTITIS MEDIA TYPE: ICD-10-CM

## 2020-03-10 PROCEDURE — 90471 TDAP VACCINE GREATER THAN OR EQUAL TO 7YO IM: ICD-10-PCS | Mod: S$GLB,,, | Performed by: FAMILY MEDICINE

## 2020-03-10 PROCEDURE — 3008F PR BODY MASS INDEX (BMI) DOCUMENTED: ICD-10-PCS | Mod: CPTII,S$GLB,, | Performed by: FAMILY MEDICINE

## 2020-03-10 PROCEDURE — 99214 OFFICE O/P EST MOD 30 MIN: CPT | Mod: 25,S$GLB,, | Performed by: FAMILY MEDICINE

## 2020-03-10 PROCEDURE — 90715 TDAP VACCINE GREATER THAN OR EQUAL TO 7YO IM: ICD-10-PCS | Mod: S$GLB,,, | Performed by: FAMILY MEDICINE

## 2020-03-10 PROCEDURE — 3008F BODY MASS INDEX DOCD: CPT | Mod: CPTII,S$GLB,, | Performed by: FAMILY MEDICINE

## 2020-03-10 PROCEDURE — 99214 PR OFFICE/OUTPT VISIT, EST, LEVL IV, 30-39 MIN: ICD-10-PCS | Mod: 25,S$GLB,, | Performed by: FAMILY MEDICINE

## 2020-03-10 PROCEDURE — 90471 IMMUNIZATION ADMIN: CPT | Mod: S$GLB,,, | Performed by: FAMILY MEDICINE

## 2020-03-10 PROCEDURE — 90715 TDAP VACCINE 7 YRS/> IM: CPT | Mod: S$GLB,,, | Performed by: FAMILY MEDICINE

## 2020-03-10 RX ORDER — AZITHROMYCIN 250 MG/1
TABLET, FILM COATED ORAL
Qty: 6 TABLET | Refills: 0 | Status: SHIPPED | OUTPATIENT
Start: 2020-03-10 | End: 2020-03-15

## 2020-03-10 NOTE — PROGRESS NOTES
Subjective:       Patient ID: Mone Best is a 64 y.o. female.    Chief Complaint: Sore Throat    Sore Throat    This is a new problem. The current episode started 1 to 4 weeks ago. The problem has been unchanged (Had URI symptoms about 1 week ago.  This is resolving, but throat remains very sore. ). The pain is worse on the right side. There has been no fever. The pain is moderate. Associated symptoms include congestion, coughing, ear pain and stridor. Pertinent negatives include no abdominal pain, diarrhea, drooling, ear discharge, headaches, hoarse voice, plugged ear sensation, neck pain, shortness of breath, swollen glands, trouble swallowing or vomiting. She has tried NSAIDs and acetaminophen for the symptoms. The treatment provided moderate relief.     Review of Systems   Constitutional: Negative for fatigue and fever.   HENT: Positive for congestion, ear pain and sore throat. Negative for drooling, ear discharge, hoarse voice, postnasal drip, rhinorrhea, sinus pressure, trouble swallowing and voice change.    Eyes: Negative for discharge.   Respiratory: Positive for cough and stridor. Negative for chest tightness, shortness of breath and wheezing.    Cardiovascular: Negative for chest pain.   Gastrointestinal: Negative for abdominal pain, diarrhea and vomiting.   Musculoskeletal: Negative for neck pain.   Skin: Negative for rash.   Neurological: Negative for headaches.       Past Medical History:   Diagnosis Date    Constipation, chronic     Diverticulosis     Glucose intolerance (impaired glucose tolerance) 5/3/2013    Hyperlipidemia 5/3/2013    Osteopenia     PSVT (paroxysmal supraventricular tachycardia)     Stress incontinence     Thyroid disease      Social History     Socioeconomic History    Marital status:      Spouse name: Not on file    Number of children: Not on file    Years of education: Not on file    Highest education level: Not on file   Occupational History    Not on  "file   Social Needs    Financial resource strain: Not on file    Food insecurity:     Worry: Not on file     Inability: Not on file    Transportation needs:     Medical: Not on file     Non-medical: Not on file   Tobacco Use    Smoking status: Never Smoker    Smokeless tobacco: Never Used   Substance and Sexual Activity    Alcohol use: No    Drug use: No    Sexual activity: Not on file   Lifestyle    Physical activity:     Days per week: Not on file     Minutes per session: Not on file    Stress: Not at all   Relationships    Social connections:     Talks on phone: Not on file     Gets together: Not on file     Attends Scientologist service: Not on file     Active member of club or organization: Not on file     Attends meetings of clubs or organizations: Not on file     Relationship status: Not on file   Other Topics Concern    Are you pregnant or think you may be? Not Asked    Breast-feeding Not Asked   Social History Narrative    Not on file     Family History   Problem Relation Age of Onset    Depression Mother     Diabetes Mother     Diabetes Father     Hypertension Father     Melanoma Neg Hx        Objective:     /86 (BP Location: Right arm, Patient Position: Sitting)   Pulse 76   Temp 97.4 °F (36.3 °C) (Oral)   Ht 5' 4" (1.626 m)   Wt 65.2 kg (143 lb 11.8 oz)   SpO2 96%   BMI 24.67 kg/m²     Physical Exam   Constitutional: She appears well-developed. No distress.   HENT:   Head: Normocephalic.   Right Ear: Tympanic membrane, external ear and ear canal normal.   Left Ear: Tympanic membrane, external ear and ear canal normal.   Nose: Rhinorrhea present.   Mouth/Throat: Posterior oropharyngeal erythema present.   Cardiovascular: Normal rate and regular rhythm.   Pulmonary/Chest: Effort normal and breath sounds normal. She has no wheezes.   Lymphadenopathy:     She has no cervical adenopathy.   Skin: Skin is warm and dry. No rash noted.       Assessment:       1. Acute pharyngitis, " unspecified etiology    2. Right otitis media, unspecified otitis media type        Plan:       Acute pharyngitis, unspecified etiology  -     azithromycin (Z-NADER) 250 MG tablet; Take 2 tablets by mouth on day 1; Take 1 tablet by mouth on days 2-5  Dispense: 6 tablet; Refill: 0    Right otitis media, unspecified otitis media type  -     azithromycin (Z-NADER) 250 MG tablet; Take 2 tablets by mouth on day 1; Take 1 tablet by mouth on days 2-5  Dispense: 6 tablet; Refill: 0    Other orders  -     (In Office Administered) Tdap Vaccine

## 2020-08-11 DIAGNOSIS — Z00.00 ROUTINE GENERAL MEDICAL EXAMINATION AT A HEALTH CARE FACILITY: Primary | ICD-10-CM

## 2020-08-20 ENCOUNTER — HOSPITAL ENCOUNTER (OUTPATIENT)
Dept: RADIOLOGY | Facility: HOSPITAL | Age: 65
Discharge: HOME | End: 2020-08-20
Attending: OBSTETRICS & GYNECOLOGY
Payer: COMMERCIAL

## 2020-08-20 DIAGNOSIS — Z12.31 ENCOUNTER FOR SCREENING MAMMOGRAM FOR MALIGNANT NEOPLASM OF BREAST: ICD-10-CM

## 2020-08-20 PROCEDURE — 77067 SCR MAMMO BI INCL CAD: CPT | Mod: TC,PO

## 2020-09-22 ENCOUNTER — PATIENT OUTREACH (OUTPATIENT)
Dept: ADMINISTRATIVE | Facility: OTHER | Age: 65
End: 2020-09-22

## 2020-09-23 ENCOUNTER — CLINICAL SUPPORT (OUTPATIENT)
Dept: INTERNAL MEDICINE | Facility: CLINIC | Age: 65
End: 2020-09-23
Payer: COMMERCIAL

## 2020-09-23 ENCOUNTER — HOSPITAL ENCOUNTER (OUTPATIENT)
Dept: CARDIOLOGY | Facility: CLINIC | Age: 65
Discharge: HOME | End: 2020-09-23
Payer: COMMERCIAL

## 2020-09-23 ENCOUNTER — OFFICE VISIT (OUTPATIENT)
Dept: PULMONOLOGY | Facility: CLINIC | Age: 65
End: 2020-09-23
Payer: COMMERCIAL

## 2020-09-23 VITALS
HEIGHT: 64 IN | HEART RATE: 65 BPM | WEIGHT: 143 LBS | DIASTOLIC BLOOD PRESSURE: 73 MMHG | SYSTOLIC BLOOD PRESSURE: 122 MMHG | BODY MASS INDEX: 24.41 KG/M2

## 2020-09-23 DIAGNOSIS — Z00.00 ANNUAL PHYSICAL EXAM: Primary | ICD-10-CM

## 2020-09-23 DIAGNOSIS — Z00.00 ROUTINE GENERAL MEDICAL EXAMINATION AT A HEALTH CARE FACILITY: ICD-10-CM

## 2020-09-23 DIAGNOSIS — Z00.00 ROUTINE GENERAL MEDICAL EXAMINATION AT A HEALTH CARE FACILITY: Primary | ICD-10-CM

## 2020-09-23 LAB
25(OH)D3+25(OH)D2 SERPL-MCNC: 64 NG/ML (ref 30–96)
ALBUMIN SERPL BCP-MCNC: 3.8 G/DL (ref 3.5–5.2)
ALP SERPL-CCNC: 45 U/L (ref 55–135)
ALT SERPL W/O P-5'-P-CCNC: 8 U/L (ref 10–44)
ANION GAP SERPL CALC-SCNC: 7 MMOL/L (ref 8–16)
AST SERPL-CCNC: 9 U/L (ref 10–40)
BILIRUB SERPL-MCNC: 0.3 MG/DL (ref 0.1–1)
BUN SERPL-MCNC: 18 MG/DL (ref 8–23)
CALCIUM SERPL-MCNC: 9.1 MG/DL (ref 8.7–10.5)
CHLORIDE SERPL-SCNC: 105 MMOL/L (ref 95–110)
CHOLEST SERPL-MCNC: 177 MG/DL (ref 120–199)
CHOLEST/HDLC SERPL: 4.1 {RATIO} (ref 2–5)
CO2 SERPL-SCNC: 28 MMOL/L (ref 23–29)
CREAT SERPL-MCNC: 0.8 MG/DL (ref 0.5–1.4)
ERYTHROCYTE [DISTWIDTH] IN BLOOD BY AUTOMATED COUNT: 12.6 % (ref 11.5–14.5)
EST. GFR  (AFRICAN AMERICAN): >60 ML/MIN/1.73 M^2
EST. GFR  (NON AFRICAN AMERICAN): >60 ML/MIN/1.73 M^2
ESTIMATED AVG GLUCOSE: 94 MG/DL (ref 68–131)
ESTRADIOL SERPL-MCNC: 49 PG/ML
GLUCOSE SERPL-MCNC: 101 MG/DL (ref 70–110)
HBA1C MFR BLD HPLC: 4.9 % (ref 4–5.6)
HCT VFR BLD AUTO: 40.8 % (ref 37–48.5)
HDLC SERPL-MCNC: 43 MG/DL (ref 40–75)
HDLC SERPL: 24.3 % (ref 20–50)
HGB BLD-MCNC: 13.7 G/DL (ref 12–16)
LDLC SERPL CALC-MCNC: 109.6 MG/DL (ref 63–159)
MCH RBC QN AUTO: 30.4 PG (ref 27–31)
MCHC RBC AUTO-ENTMCNC: 33.6 G/DL (ref 32–36)
MCV RBC AUTO: 91 FL (ref 82–98)
NONHDLC SERPL-MCNC: 134 MG/DL
PLATELET # BLD AUTO: 279 K/UL (ref 150–350)
PMV BLD AUTO: 9.3 FL (ref 9.2–12.9)
POTASSIUM SERPL-SCNC: 4 MMOL/L (ref 3.5–5.1)
PROT SERPL-MCNC: 6.5 G/DL (ref 6–8.4)
RBC # BLD AUTO: 4.5 M/UL (ref 4–5.4)
SODIUM SERPL-SCNC: 140 MMOL/L (ref 136–145)
T3FREE SERPL-MCNC: 2.9 PG/ML (ref 2.3–4.2)
T4 FREE SERPL-MCNC: 0.9 NG/DL (ref 0.71–1.51)
TRIGL SERPL-MCNC: 122 MG/DL (ref 30–150)
TSH SERPL DL<=0.005 MIU/L-ACNC: 0.41 UIU/ML (ref 0.4–4)
WBC # BLD AUTO: 7.82 K/UL (ref 3.9–12.7)

## 2020-09-23 PROCEDURE — 82670 ASSAY OF TOTAL ESTRADIOL: CPT

## 2020-09-23 PROCEDURE — 99999 PR PBB SHADOW E&M-EST. PATIENT-LVL I: ICD-10-PCS | Mod: PBBFAC,,,

## 2020-09-23 PROCEDURE — 3008F BODY MASS INDEX DOCD: CPT | Mod: CPTII,S$GLB,, | Performed by: INTERNAL MEDICINE

## 2020-09-23 PROCEDURE — 82306 VITAMIN D 25 HYDROXY: CPT

## 2020-09-23 PROCEDURE — 99999 PR PBB SHADOW E&M-EST. PATIENT-LVL III: ICD-10-PCS | Mod: PBBFAC,,, | Performed by: INTERNAL MEDICINE

## 2020-09-23 PROCEDURE — 99386 PR PREVENTIVE VISIT,NEW,40-64: ICD-10-PCS | Mod: S$GLB,,, | Performed by: INTERNAL MEDICINE

## 2020-09-23 PROCEDURE — 93005 ELECTROCARDIOGRAM TRACING: CPT | Mod: S$GLB,,, | Performed by: INTERNAL MEDICINE

## 2020-09-23 PROCEDURE — 3008F PR BODY MASS INDEX (BMI) DOCUMENTED: ICD-10-PCS | Mod: CPTII,S$GLB,, | Performed by: INTERNAL MEDICINE

## 2020-09-23 PROCEDURE — 99999 PR PBB SHADOW E&M-EST. PATIENT-LVL I: CPT | Mod: PBBFAC,,,

## 2020-09-23 PROCEDURE — 84630 ASSAY OF ZINC: CPT

## 2020-09-23 PROCEDURE — 99386 PREV VISIT NEW AGE 40-64: CPT | Mod: S$GLB,,, | Performed by: INTERNAL MEDICINE

## 2020-09-23 PROCEDURE — 85027 COMPLETE CBC AUTOMATED: CPT

## 2020-09-23 PROCEDURE — 93010 ELECTROCARDIOGRAM REPORT: CPT | Mod: S$GLB,,, | Performed by: INTERNAL MEDICINE

## 2020-09-23 PROCEDURE — 93010 EKG 12-LEAD: ICD-10-PCS | Mod: S$GLB,,, | Performed by: INTERNAL MEDICINE

## 2020-09-23 PROCEDURE — 84443 ASSAY THYROID STIM HORMONE: CPT

## 2020-09-23 PROCEDURE — 80061 LIPID PANEL: CPT

## 2020-09-23 PROCEDURE — 84481 FREE ASSAY (FT-3): CPT

## 2020-09-23 PROCEDURE — 80053 COMPREHEN METABOLIC PANEL: CPT

## 2020-09-23 PROCEDURE — 93005 EKG 12-LEAD: ICD-10-PCS | Mod: S$GLB,,, | Performed by: INTERNAL MEDICINE

## 2020-09-23 PROCEDURE — 82626 DEHYDROEPIANDROSTERONE: CPT

## 2020-09-23 PROCEDURE — 83036 HEMOGLOBIN GLYCOSYLATED A1C: CPT

## 2020-09-23 PROCEDURE — 83735 ASSAY OF MAGNESIUM: CPT

## 2020-09-23 PROCEDURE — 84439 ASSAY OF FREE THYROXINE: CPT

## 2020-09-23 PROCEDURE — 99999 PR PBB SHADOW E&M-EST. PATIENT-LVL III: CPT | Mod: PBBFAC,,, | Performed by: INTERNAL MEDICINE

## 2020-09-23 PROCEDURE — 36415 COLL VENOUS BLD VENIPUNCTURE: CPT

## 2020-09-23 NOTE — PROGRESS NOTES
Subjective:       Patient ID: Mone Best is a 65 y.o. female.    Chief Complaint: Annual Exam    HPI  66 yo spouse of retired [employer] employee. 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 Has hx of rare bouts of diverticulitis that has responded to antibiotics ordered by Dr. Dhillon of [facility], now retired.   Exercises regularly at local spa. No active medical complaints.  Has a small sebaceous cyst on right trapezius, drained without incident. .   Review of Systems   Constitutional: Negative.    HENT: Negative.    Eyes: Negative.    Respiratory: Negative.    Cardiovascular: Negative.    Gastrointestinal: Negative.         Hs of rare flare ups of diverticulitis.    Endocrine:        PCP follows and regulates her thyroid medications.   Genitourinary: Negative.    Musculoskeletal: Negative.    Integumentary:  Negative.   Neurological: Negative.    Psychiatric/Behavioral: Negative.    All other systems reviewed and are negative.        Objective:      Physical Exam  Vitals signs and nursing note reviewed.   Constitutional:       General: She is not in acute distress.     Appearance: She is well-developed.   HENT:      Head: Normocephalic and atraumatic.      Right Ear: External ear normal.      Left Ear: External ear normal.      Nose: Nose normal.   Eyes:      Conjunctiva/sclera: Conjunctivae normal.      Pupils: Pupils are equal, round, and reactive to light.   Neck:      Musculoskeletal: Normal range of motion and neck supple.      Thyroid: No thyromegaly.      Vascular: No JVD.   Cardiovascular:      Rate and Rhythm: Normal rate and regular rhythm.      Heart sounds: Normal heart sounds. No murmur. No gallop.    Pulmonary:      Effort: No respiratory distress.      Breath sounds: Normal breath sounds. No stridor. No wheezing or rales.   Chest:      Chest wall: No tenderness.   Abdominal:      General: Bowel sounds are normal. There is no  distension.      Palpations: Abdomen is soft. There is no mass.      Tenderness: There is no abdominal tenderness. There is no guarding or rebound.   Musculoskeletal: Normal range of motion.   Lymphadenopathy:      Cervical: No cervical adenopathy.   Skin:     General: Skin is warm and dry.      Findings: No rash.      Comments: Small sebaceous cyst right trapezius that I expressed.    Neurological:      Mental Status: She is alert and oriented to person, place, and time.      Cranial Nerves: No cranial nerve deficit.      Deep Tendon Reflexes: Reflexes are normal and symmetric. Reflexes normal.   Psychiatric:         Behavior: Behavior normal.         Thought Content: Thought content normal.         Judgment: Judgment normal.         Assessment:       1. Annual physical exam        Plan:       Labs: All parameters are normal EKG has no specific changes, denies any cardiac symtpoms at rest or exercise.

## 2020-09-23 NOTE — LETTER
September 23, 2020    Mone Best  [address] [facility] [place] [address]             [facility] [address]  [address]  [facility] [address]  Phone: [phone] Dear Mrs. Best:    Thank you for allowing me to serve you and perform your Executive Health exam on 9/23/2020. This letter will serve as a brief summary of the physical findings and laboratory/studies performed and recommendations at this time. Today's assessment is essentially normal. All lab parameters are normal and unchanged from September, 2019.        If you have any questions or concerns, please don't hesitate to call.    Sincerely,        John Cavazos MD

## 2020-09-25 LAB — ZINC SERPL-MCNC: 73 UG/DL (ref 60–130)

## 2020-09-28 LAB — DHEA SERPL-MCNC: 1.11 NG/ML (ref 0.63–4.7)

## 2020-09-29 LAB — MAGNESIUM RBC-MCNC: 5.5 MG/DL (ref 4–6.4)

## 2021-02-15 ENCOUNTER — HOSPITAL ENCOUNTER (EMERGENCY)
Facility: HOSPITAL | Age: 66
Discharge: HOME | End: 2021-02-15
Attending: EMERGENCY MEDICINE
Payer: COMMERCIAL

## 2021-02-15 VITALS
WEIGHT: 143 LBS | RESPIRATION RATE: 18 BRPM | HEIGHT: 64 IN | BODY MASS INDEX: 24.41 KG/M2 | TEMPERATURE: 98 F | SYSTOLIC BLOOD PRESSURE: 120 MMHG | OXYGEN SATURATION: 95 % | HEART RATE: 82 BPM | DIASTOLIC BLOOD PRESSURE: 82 MMHG

## 2021-02-15 DIAGNOSIS — R11.0 NAUSEA: ICD-10-CM

## 2021-02-15 DIAGNOSIS — R10.32 LLQ ABDOMINAL PAIN: ICD-10-CM

## 2021-02-15 DIAGNOSIS — K57.92 DIVERTICULITIS: Primary | ICD-10-CM

## 2021-02-15 PROCEDURE — 96375 TX/PRO/DX INJ NEW DRUG ADDON: CPT | Mod: ER

## 2021-02-15 PROCEDURE — 99284 EMERGENCY DEPT VISIT MOD MDM: CPT | Mod: 25,ER

## 2021-02-15 PROCEDURE — 96374 THER/PROPH/DIAG INJ IV PUSH: CPT | Mod: ER

## 2021-02-15 PROCEDURE — 96361 HYDRATE IV INFUSION ADD-ON: CPT | Mod: ER

## 2021-02-15 PROCEDURE — 63600175 PHARM REV CODE 636 W HCPCS: Mod: ER | Performed by: EMERGENCY MEDICINE

## 2021-02-15 RX ORDER — METRONIDAZOLE 500 MG/1
500 TABLET ORAL EVERY 12 HOURS
Qty: 14 TABLET | Refills: 0 | Status: SHIPPED | OUTPATIENT
Start: 2021-02-15 | End: 2021-02-22

## 2021-02-15 RX ORDER — ONDANSETRON 8 MG/1
8 TABLET, ORALLY DISINTEGRATING ORAL EVERY 8 HOURS PRN
Qty: 20 TABLET | Refills: 0 | Status: SHIPPED | OUTPATIENT
Start: 2021-02-15 | End: 2021-02-22

## 2021-02-15 RX ORDER — DOCUSATE SODIUM 100 MG/1
100 CAPSULE, LIQUID FILLED ORAL 2 TIMES DAILY
Qty: 20 CAPSULE | Refills: 0 | Status: SHIPPED | OUTPATIENT
Start: 2021-02-15 | End: 2021-10-26

## 2021-02-15 RX ORDER — TRAMADOL HYDROCHLORIDE 50 MG/1
50 TABLET ORAL EVERY 6 HOURS PRN
Qty: 12 TABLET | Refills: 0 | Status: SHIPPED | OUTPATIENT
Start: 2021-02-15 | End: 2021-10-26

## 2021-02-15 RX ORDER — KETOROLAC TROMETHAMINE 10 MG/1
10 TABLET, FILM COATED ORAL
Status: DISCONTINUED | OUTPATIENT
Start: 2021-02-15 | End: 2021-02-15

## 2021-02-15 RX ORDER — CIPROFLOXACIN 500 MG/1
500 TABLET ORAL 2 TIMES DAILY
Qty: 14 TABLET | Refills: 0 | Status: SHIPPED | OUTPATIENT
Start: 2021-02-15 | End: 2021-02-22

## 2021-02-15 RX ORDER — KETOROLAC TROMETHAMINE 30 MG/ML
15 INJECTION, SOLUTION INTRAMUSCULAR; INTRAVENOUS
Status: COMPLETED | OUTPATIENT
Start: 2021-02-15 | End: 2021-02-15

## 2021-02-15 RX ORDER — FAMOTIDINE 20 MG/1
20 TABLET, FILM COATED ORAL 2 TIMES DAILY
Qty: 20 TABLET | Refills: 0 | Status: SHIPPED | OUTPATIENT
Start: 2021-02-15 | End: 2021-10-26

## 2021-02-15 RX ORDER — ONDANSETRON 2 MG/ML
8 INJECTION INTRAMUSCULAR; INTRAVENOUS
Status: COMPLETED | OUTPATIENT
Start: 2021-02-15 | End: 2021-02-15

## 2021-02-15 RX ORDER — MORPHINE SULFATE 4 MG/ML
6 INJECTION, SOLUTION INTRAMUSCULAR; INTRAVENOUS
Status: COMPLETED | OUTPATIENT
Start: 2021-02-15 | End: 2021-02-15

## 2021-02-15 RX ADMIN — KETOROLAC TROMETHAMINE 15 MG: 30 INJECTION, SOLUTION INTRAMUSCULAR at 07:02

## 2021-02-15 RX ADMIN — ONDANSETRON 8 MG: 2 INJECTION INTRAMUSCULAR; INTRAVENOUS at 06:02

## 2021-02-15 RX ADMIN — MORPHINE SULFATE 6 MG: 4 INJECTION INTRAVENOUS at 07:02

## 2021-02-15 RX ADMIN — SODIUM CHLORIDE, SODIUM LACTATE, POTASSIUM CHLORIDE, AND CALCIUM CHLORIDE 1000 ML: .6; .31; .03; .02 INJECTION, SOLUTION INTRAVENOUS at 06:02

## 2021-02-17 ENCOUNTER — TELEPHONE (OUTPATIENT)
Dept: FAMILY MEDICINE | Facility: CLINIC | Age: 66
End: 2021-02-17

## 2021-02-19 ENCOUNTER — OFFICE VISIT (OUTPATIENT)
Dept: FAMILY MEDICINE | Facility: CLINIC | Age: 66
End: 2021-02-19
Payer: COMMERCIAL

## 2021-02-19 VITALS
SYSTOLIC BLOOD PRESSURE: 108 MMHG | WEIGHT: 142.88 LBS | DIASTOLIC BLOOD PRESSURE: 72 MMHG | HEIGHT: 64 IN | HEART RATE: 99 BPM | BODY MASS INDEX: 24.39 KG/M2 | OXYGEN SATURATION: 97 % | TEMPERATURE: 98 F

## 2021-02-19 DIAGNOSIS — K57.92 DIVERTICULITIS: Primary | ICD-10-CM

## 2021-02-19 PROCEDURE — 99213 PR OFFICE/OUTPT VISIT, EST, LEVL III, 20-29 MIN: ICD-10-PCS | Mod: S$GLB,,, | Performed by: FAMILY MEDICINE

## 2021-02-19 PROCEDURE — 1125F PR PAIN SEVERITY QUANTIFIED, PAIN PRESENT: ICD-10-PCS | Mod: S$GLB,,, | Performed by: FAMILY MEDICINE

## 2021-02-19 PROCEDURE — 99213 OFFICE O/P EST LOW 20 MIN: CPT | Mod: S$GLB,,, | Performed by: FAMILY MEDICINE

## 2021-02-19 PROCEDURE — 1125F AMNT PAIN NOTED PAIN PRSNT: CPT | Mod: S$GLB,,, | Performed by: FAMILY MEDICINE

## 2021-02-19 PROCEDURE — 3008F BODY MASS INDEX DOCD: CPT | Mod: CPTII,S$GLB,, | Performed by: FAMILY MEDICINE

## 2021-02-19 PROCEDURE — 3008F PR BODY MASS INDEX (BMI) DOCUMENTED: ICD-10-PCS | Mod: CPTII,S$GLB,, | Performed by: FAMILY MEDICINE

## 2021-02-23 ENCOUNTER — PATIENT MESSAGE (OUTPATIENT)
Dept: FAMILY MEDICINE | Facility: CLINIC | Age: 66
End: 2021-02-23

## 2021-02-23 DIAGNOSIS — R10.9 ABDOMINAL PAIN, UNSPECIFIED ABDOMINAL LOCATION: ICD-10-CM

## 2021-02-25 ENCOUNTER — PATIENT MESSAGE (OUTPATIENT)
Dept: FAMILY MEDICINE | Facility: CLINIC | Age: 66
End: 2021-02-25

## 2021-02-25 ENCOUNTER — HOSPITAL ENCOUNTER (OUTPATIENT)
Dept: RADIOLOGY | Facility: HOSPITAL | Age: 66
Discharge: HOME | End: 2021-02-25
Attending: FAMILY MEDICINE
Payer: COMMERCIAL

## 2021-02-25 DIAGNOSIS — R10.9 ABDOMINAL PAIN, UNSPECIFIED ABDOMINAL LOCATION: ICD-10-CM

## 2021-02-25 PROCEDURE — 25500020 PHARM REV CODE 255: Mod: PO | Performed by: FAMILY MEDICINE

## 2021-02-25 PROCEDURE — 74177 CT ABD & PELVIS W/CONTRAST: CPT | Mod: TC,PO

## 2021-02-25 RX ADMIN — IOHEXOL 90 ML: 350 INJECTION, SOLUTION INTRAVENOUS at 03:02

## 2021-02-25 RX ADMIN — IOHEXOL 30 ML: 300 INJECTION, SOLUTION INTRAVENOUS at 03:02

## 2021-02-26 DIAGNOSIS — K59.00 CONSTIPATION, UNSPECIFIED CONSTIPATION TYPE: Primary | ICD-10-CM

## 2021-02-26 RX ORDER — LACTULOSE 10 G/15ML
20 SOLUTION ORAL 3 TIMES DAILY
Qty: 450 ML | Refills: 0 | Status: SHIPPED | OUTPATIENT
Start: 2021-02-26 | End: 2021-03-03

## 2021-08-25 DIAGNOSIS — Z12.31 OTHER SCREENING MAMMOGRAM: ICD-10-CM

## 2021-09-27 DIAGNOSIS — M85.80 SAPHO SYNDROME: Primary | ICD-10-CM

## 2021-09-27 DIAGNOSIS — M65.9 SAPHO SYNDROME: Primary | ICD-10-CM

## 2021-09-27 DIAGNOSIS — L40.3 SAPHO SYNDROME: Primary | ICD-10-CM

## 2021-09-27 DIAGNOSIS — L70.9 SAPHO SYNDROME: Primary | ICD-10-CM

## 2021-09-27 DIAGNOSIS — M86.9 SAPHO SYNDROME: Primary | ICD-10-CM

## 2021-10-04 ENCOUNTER — PATIENT MESSAGE (OUTPATIENT)
Dept: FAMILY MEDICINE | Facility: CLINIC | Age: 66
End: 2021-10-04

## 2021-10-12 ENCOUNTER — HOSPITAL ENCOUNTER (OUTPATIENT)
Dept: RADIOLOGY | Facility: HOSPITAL | Age: 66
Discharge: HOME | End: 2021-10-12
Attending: OBSTETRICS & GYNECOLOGY
Payer: MEDICARE

## 2021-10-12 ENCOUNTER — HOSPITAL ENCOUNTER (OUTPATIENT)
Dept: RADIOLOGY | Facility: HOSPITAL | Age: 66
Discharge: HOME | End: 2021-10-12
Attending: FAMILY MEDICINE
Payer: MEDICARE

## 2021-10-12 DIAGNOSIS — M85.80 SAPHO SYNDROME: ICD-10-CM

## 2021-10-12 DIAGNOSIS — L70.9 SAPHO SYNDROME: ICD-10-CM

## 2021-10-12 DIAGNOSIS — Z12.31 OTHER SCREENING MAMMOGRAM: ICD-10-CM

## 2021-10-12 DIAGNOSIS — M65.9 SAPHO SYNDROME: ICD-10-CM

## 2021-10-12 DIAGNOSIS — L40.3 SAPHO SYNDROME: ICD-10-CM

## 2021-10-12 DIAGNOSIS — M86.9 SAPHO SYNDROME: ICD-10-CM

## 2021-10-12 PROCEDURE — 77080 DXA BONE DENSITY AXIAL: CPT | Mod: TC,PO

## 2021-10-12 PROCEDURE — 77067 SCR MAMMO BI INCL CAD: CPT | Mod: TC,PO

## 2021-10-26 ENCOUNTER — PATIENT MESSAGE (OUTPATIENT)
Dept: INTERNAL MEDICINE | Facility: CLINIC | Age: 66
End: 2021-10-26

## 2021-10-26 ENCOUNTER — OFFICE VISIT (OUTPATIENT)
Dept: INTERNAL MEDICINE | Facility: CLINIC | Age: 66
End: 2021-10-26
Payer: MEDICARE

## 2021-10-26 ENCOUNTER — CLINICAL SUPPORT (OUTPATIENT)
Dept: INTERNAL MEDICINE | Facility: CLINIC | Age: 66
End: 2021-10-26
Payer: MEDICARE

## 2021-10-26 VITALS
SYSTOLIC BLOOD PRESSURE: 111 MMHG | OXYGEN SATURATION: 98 % | TEMPERATURE: 98 F | DIASTOLIC BLOOD PRESSURE: 81 MMHG | BODY MASS INDEX: 24.84 KG/M2 | HEIGHT: 64 IN | HEART RATE: 76 BPM | WEIGHT: 145.5 LBS

## 2021-10-26 DIAGNOSIS — E89.40 SURGICAL MENOPAUSE ON HORMONE REPLACEMENT THERAPY: ICD-10-CM

## 2021-10-26 DIAGNOSIS — R73.09 OTHER ABNORMAL GLUCOSE: Primary | ICD-10-CM

## 2021-10-26 DIAGNOSIS — R79.9 ABNORMAL BLOOD CHEMISTRY: ICD-10-CM

## 2021-10-26 DIAGNOSIS — E55.9 VITAMIN D DEFICIENCY: ICD-10-CM

## 2021-10-26 DIAGNOSIS — Z79.890 SURGICAL MENOPAUSE ON HORMONE REPLACEMENT THERAPY: ICD-10-CM

## 2021-10-26 DIAGNOSIS — E78.5 HYPERLIPIDEMIA, UNSPECIFIED HYPERLIPIDEMIA TYPE: ICD-10-CM

## 2021-10-26 DIAGNOSIS — Z00.00 PREVENTATIVE HEALTH CARE: Primary | ICD-10-CM

## 2021-10-26 DIAGNOSIS — R73.02 GLUCOSE INTOLERANCE (IMPAIRED GLUCOSE TOLERANCE): ICD-10-CM

## 2021-10-26 DIAGNOSIS — Z23 NEED FOR 23-POLYVALENT PNEUMOCOCCAL POLYSACCHARIDE VACCINE: ICD-10-CM

## 2021-10-26 DIAGNOSIS — M85.80 OSTEOPENIA, UNSPECIFIED LOCATION: ICD-10-CM

## 2021-10-26 DIAGNOSIS — E03.9 ACQUIRED HYPOTHYROIDISM: ICD-10-CM

## 2021-10-26 DIAGNOSIS — Z12.83 SKIN CANCER SCREENING: ICD-10-CM

## 2021-10-26 DIAGNOSIS — R53.83 FATIGUE, UNSPECIFIED TYPE: ICD-10-CM

## 2021-10-26 LAB
25(OH)D3+25(OH)D2 SERPL-MCNC: 89 NG/ML (ref 30–96)
ALBUMIN SERPL BCP-MCNC: 4 G/DL (ref 3.5–5.2)
ALP SERPL-CCNC: 45 U/L (ref 55–135)
ALT SERPL W/O P-5'-P-CCNC: 14 U/L (ref 10–44)
ANION GAP SERPL CALC-SCNC: 7 MMOL/L (ref 8–16)
AST SERPL-CCNC: 12 U/L (ref 10–40)
BILIRUB SERPL-MCNC: 0.4 MG/DL (ref 0.1–1)
BUN SERPL-MCNC: 16 MG/DL (ref 8–23)
CALCIUM SERPL-MCNC: 9.1 MG/DL (ref 8.7–10.5)
CHLORIDE SERPL-SCNC: 106 MMOL/L (ref 95–110)
CHOLEST SERPL-MCNC: 185 MG/DL (ref 120–199)
CHOLEST/HDLC SERPL: 4.4 {RATIO} (ref 2–5)
CO2 SERPL-SCNC: 26 MMOL/L (ref 23–29)
CREAT SERPL-MCNC: 0.7 MG/DL (ref 0.5–1.4)
ERYTHROCYTE [DISTWIDTH] IN BLOOD BY AUTOMATED COUNT: 12.5 % (ref 11.5–14.5)
EST. GFR  (AFRICAN AMERICAN): >60 ML/MIN/1.73 M^2
EST. GFR  (NON AFRICAN AMERICAN): >60 ML/MIN/1.73 M^2
ESTIMATED AVG GLUCOSE: 88 MG/DL (ref 68–131)
GLUCOSE SERPL-MCNC: 95 MG/DL (ref 70–110)
HBA1C MFR BLD: 4.7 % (ref 4–5.6)
HCT VFR BLD AUTO: 40.9 % (ref 37–48.5)
HDLC SERPL-MCNC: 42 MG/DL (ref 40–75)
HDLC SERPL: 22.7 % (ref 20–50)
HGB BLD-MCNC: 14.1 G/DL (ref 12–16)
LDLC SERPL CALC-MCNC: 109.6 MG/DL (ref 63–159)
MCH RBC QN AUTO: 30.3 PG (ref 27–31)
MCHC RBC AUTO-ENTMCNC: 34.5 G/DL (ref 32–36)
MCV RBC AUTO: 88 FL (ref 82–98)
NONHDLC SERPL-MCNC: 143 MG/DL
PLATELET # BLD AUTO: 301 K/UL (ref 150–450)
PMV BLD AUTO: 9.2 FL (ref 9.2–12.9)
POTASSIUM SERPL-SCNC: 4.4 MMOL/L (ref 3.5–5.1)
PROT SERPL-MCNC: 6.9 G/DL (ref 6–8.4)
RBC # BLD AUTO: 4.65 M/UL (ref 4–5.4)
SODIUM SERPL-SCNC: 139 MMOL/L (ref 136–145)
T4 FREE SERPL-MCNC: 0.79 NG/DL (ref 0.71–1.51)
TRIGL SERPL-MCNC: 167 MG/DL (ref 30–150)
TSH SERPL DL<=0.005 MIU/L-ACNC: 0.81 UIU/ML (ref 0.4–4)
TSH SERPL DL<=0.005 MIU/L-ACNC: 0.82 UIU/ML (ref 0.4–4)
WBC # BLD AUTO: 8.83 K/UL (ref 3.9–12.7)

## 2021-10-26 PROCEDURE — 84439 ASSAY OF FREE THYROXINE: CPT | Performed by: FAMILY MEDICINE

## 2021-10-26 PROCEDURE — 85027 COMPLETE CBC AUTOMATED: CPT | Performed by: FAMILY MEDICINE

## 2021-10-26 PROCEDURE — 80053 COMPREHEN METABOLIC PANEL: CPT | Performed by: FAMILY MEDICINE

## 2021-10-26 PROCEDURE — 99397 PR PREVENTIVE VISIT,EST,65 & OVER: ICD-10-PCS | Mod: S$PBB,,, | Performed by: FAMILY MEDICINE

## 2021-10-26 PROCEDURE — 99397 PER PM REEVAL EST PAT 65+ YR: CPT | Mod: S$PBB,,, | Performed by: FAMILY MEDICINE

## 2021-10-26 PROCEDURE — 83036 HEMOGLOBIN GLYCOSYLATED A1C: CPT | Performed by: FAMILY MEDICINE

## 2021-10-26 PROCEDURE — G0008 ADMIN INFLUENZA VIRUS VAC: HCPCS | Mod: PBBFAC

## 2021-10-26 PROCEDURE — 90732 PPSV23 VACC 2 YRS+ SUBQ/IM: CPT | Mod: PBBFAC

## 2021-10-26 PROCEDURE — 99999 PR PBB SHADOW E&M-EST. PATIENT-LVL V: CPT | Mod: PBBFAC,,, | Performed by: FAMILY MEDICINE

## 2021-10-26 PROCEDURE — 82306 VITAMIN D 25 HYDROXY: CPT | Performed by: FAMILY MEDICINE

## 2021-10-26 PROCEDURE — 84443 ASSAY THYROID STIM HORMONE: CPT | Mod: 91 | Performed by: FAMILY MEDICINE

## 2021-10-26 PROCEDURE — 99999 PR PBB SHADOW E&M-EST. PATIENT-LVL V: ICD-10-PCS | Mod: PBBFAC,,, | Performed by: FAMILY MEDICINE

## 2021-10-26 PROCEDURE — 80061 LIPID PANEL: CPT | Performed by: FAMILY MEDICINE

## 2021-10-26 PROCEDURE — 90694 VACC AIIV4 NO PRSRV 0.5ML IM: CPT | Mod: PBBFAC

## 2021-10-26 PROCEDURE — 99215 OFFICE O/P EST HI 40 MIN: CPT | Mod: PBBFAC,25 | Performed by: FAMILY MEDICINE

## 2021-10-26 PROCEDURE — G0009 ADMIN PNEUMOCOCCAL VACCINE: HCPCS | Mod: PBBFAC

## 2021-10-26 RX ORDER — CALCIUM CARBONATE 500(1250)
2 TABLET ORAL DAILY
Qty: 180 TABLET | Refills: 4 | Status: SHIPPED | OUTPATIENT
Start: 2021-10-26 | End: 2022-10-21 | Stop reason: SDUPTHER

## 2021-11-04 ENCOUNTER — PATIENT OUTREACH (OUTPATIENT)
Dept: ADMINISTRATIVE | Facility: HOSPITAL | Age: 66
End: 2021-11-04
Payer: COMMERCIAL

## 2021-11-05 ENCOUNTER — IMMUNIZATION (OUTPATIENT)
Dept: INTERNAL MEDICINE | Facility: CLINIC | Age: 66
End: 2021-11-05
Payer: MEDICARE

## 2021-11-05 DIAGNOSIS — Z23 NEED FOR VACCINATION: Primary | ICD-10-CM

## 2021-11-05 PROCEDURE — 0064A COVID-19, MRNA, LNP-S, PF, 100 MCG/0.25 ML DOSE VACCINE (MODERNA BOOSTER): CPT | Mod: PBBFAC,PO

## 2021-11-06 PROBLEM — E89.40 SURGICAL MENOPAUSE ON HORMONE REPLACEMENT THERAPY: Status: ACTIVE | Noted: 2021-11-06

## 2021-11-06 PROBLEM — E55.9 VITAMIN D DEFICIENCY: Status: ACTIVE | Noted: 2021-11-06

## 2021-11-06 PROBLEM — Z79.890 SURGICAL MENOPAUSE ON HORMONE REPLACEMENT THERAPY: Status: ACTIVE | Noted: 2021-11-06

## 2021-11-06 RX ORDER — LEVOTHYROXINE SODIUM 150 MCG
150 TABLET ORAL
Qty: 90 TABLET | Refills: 4 | Status: SHIPPED | OUTPATIENT
Start: 2021-11-06 | End: 2021-11-14 | Stop reason: DRUGHIGH

## 2021-11-06 RX ORDER — LIOTHYRONINE SODIUM 5 UG/1
5 TABLET ORAL 2 TIMES DAILY
Qty: 180 TABLET | Refills: 4 | Status: SHIPPED | OUTPATIENT
Start: 2021-11-06 | End: 2021-11-14 | Stop reason: DRUGHIGH

## 2021-11-08 ENCOUNTER — PATIENT MESSAGE (OUTPATIENT)
Dept: INTERNAL MEDICINE | Facility: CLINIC | Age: 66
End: 2021-11-08
Payer: COMMERCIAL

## 2021-11-08 DIAGNOSIS — E03.9 ACQUIRED HYPOTHYROIDISM: ICD-10-CM

## 2021-11-12 ENCOUNTER — PATIENT OUTREACH (OUTPATIENT)
Dept: ADMINISTRATIVE | Facility: OTHER | Age: 66
End: 2021-11-12
Payer: COMMERCIAL

## 2021-11-14 ENCOUNTER — PATIENT MESSAGE (OUTPATIENT)
Dept: INTERNAL MEDICINE | Facility: CLINIC | Age: 66
End: 2021-11-14
Payer: COMMERCIAL

## 2021-11-14 RX ORDER — LEVOTHYROXINE SODIUM 175 UG/1
87.5 TABLET ORAL
Qty: 45 TABLET | Refills: 3 | Status: SHIPPED | OUTPATIENT
Start: 2021-11-14 | End: 2022-10-21 | Stop reason: SDUPTHER

## 2021-11-14 RX ORDER — LIOTHYRONINE SODIUM 5 UG/1
5 TABLET ORAL 3 TIMES DAILY
Qty: 270 TABLET | Refills: 3 | Status: SHIPPED | OUTPATIENT
Start: 2021-11-14 | End: 2022-10-21 | Stop reason: SDUPTHER

## 2021-11-15 ENCOUNTER — OFFICE VISIT (OUTPATIENT)
Dept: DERMATOLOGY | Facility: CLINIC | Age: 66
End: 2021-11-15
Payer: MEDICARE

## 2021-11-15 DIAGNOSIS — L72.0 EPIDERMAL INCLUSION CYST: ICD-10-CM

## 2021-11-15 DIAGNOSIS — D18.00 HEMANGIOMA, UNSPECIFIED SITE: Primary | ICD-10-CM

## 2021-11-15 DIAGNOSIS — D22.9 MULTIPLE BENIGN NEVI: ICD-10-CM

## 2021-11-15 DIAGNOSIS — Z12.83 SKIN CANCER SCREENING: ICD-10-CM

## 2021-11-15 DIAGNOSIS — L81.4 SOLAR LENTIGO: ICD-10-CM

## 2021-11-15 PROCEDURE — 99213 OFFICE O/P EST LOW 20 MIN: CPT | Mod: PBBFAC | Performed by: STUDENT IN AN ORGANIZED HEALTH CARE EDUCATION/TRAINING PROGRAM

## 2021-11-15 PROCEDURE — 99203 OFFICE O/P NEW LOW 30 MIN: CPT | Mod: S$PBB,,, | Performed by: STUDENT IN AN ORGANIZED HEALTH CARE EDUCATION/TRAINING PROGRAM

## 2021-11-15 PROCEDURE — 99203 PR OFFICE/OUTPT VISIT, NEW, LEVL III, 30-44 MIN: ICD-10-PCS | Mod: S$PBB,,, | Performed by: STUDENT IN AN ORGANIZED HEALTH CARE EDUCATION/TRAINING PROGRAM

## 2021-11-15 PROCEDURE — 99999 PR PBB SHADOW E&M-EST. PATIENT-LVL III: ICD-10-PCS | Mod: PBBFAC,,, | Performed by: STUDENT IN AN ORGANIZED HEALTH CARE EDUCATION/TRAINING PROGRAM

## 2021-11-15 PROCEDURE — 99999 PR PBB SHADOW E&M-EST. PATIENT-LVL III: CPT | Mod: PBBFAC,,, | Performed by: STUDENT IN AN ORGANIZED HEALTH CARE EDUCATION/TRAINING PROGRAM

## 2022-02-09 ENCOUNTER — PATIENT MESSAGE (OUTPATIENT)
Dept: INTERNAL MEDICINE | Facility: CLINIC | Age: 67
End: 2022-02-09
Payer: COMMERCIAL

## 2022-02-09 DIAGNOSIS — Z79.890 SURGICAL MENOPAUSE ON HORMONE REPLACEMENT THERAPY: Primary | ICD-10-CM

## 2022-02-09 DIAGNOSIS — E89.40 SURGICAL MENOPAUSE ON HORMONE REPLACEMENT THERAPY: Primary | ICD-10-CM

## 2022-02-09 RX ORDER — ESTRADIOL 0.1 MG/D
1 PATCH TRANSDERMAL
Qty: 8 PATCH | Refills: 0 | Status: SHIPPED | OUTPATIENT
Start: 2022-02-10 | End: 2022-03-18 | Stop reason: SDUPTHER

## 2022-02-09 RX ORDER — ESTRADIOL 0.1 MG/D
PATCH TRANSDERMAL
COMMUNITY
Start: 2022-01-26 | End: 2022-02-09 | Stop reason: SDUPTHER

## 2022-02-09 RX ORDER — ESTRADIOL 0.1 MG/D
1 PATCH TRANSDERMAL
Qty: 24 PATCH | Refills: 2 | Status: SHIPPED | OUTPATIENT
Start: 2022-02-10 | End: 2022-03-18 | Stop reason: SDUPTHER

## 2022-02-09 NOTE — TELEPHONE ENCOUNTER
----- Message from Gail Gavin sent at 2/9/2022  2:42 PM CST -----  Contact: [phone]  Patient would like to consult with a nurse in regards to a prescription request. Please call back at [phone]. Thanks r/s

## 2022-02-09 NOTE — TELEPHONE ENCOUNTER
Spoke with pt regarding patches Climara disk 0.1mg applied to skin twice a day. Pt stated that medication need to to be order 6 boxes with 4 patches in each box 84 days supply.//LB

## 2022-02-09 NOTE — TELEPHONE ENCOUNTER
Mone Hinton.    Sorry for the confusion.    I sent you a 1-month prescription to your local [facility] in [place] and a 9-month prescription to [facility].    All the best,    AFRICA Plummer MD      Medications Ordered This Encounter   Medications    CLIMARA 0.1 mg/24 hr PTWK     Sig: Place 1 patch onto the skin twice a week.     Dispense:  8 patch     Refill:  0    CLIMARA 0.1 mg/24 hr PTWK     Sig: Place 1 patch onto the skin twice a week.     Dispense:  24 patch     Refill:  2

## 2022-03-16 ENCOUNTER — PATIENT MESSAGE (OUTPATIENT)
Dept: INTERNAL MEDICINE | Facility: CLINIC | Age: 67
End: 2022-03-16
Payer: COMMERCIAL

## 2022-03-16 DIAGNOSIS — Z79.890 SURGICAL MENOPAUSE ON HORMONE REPLACEMENT THERAPY: ICD-10-CM

## 2022-03-16 DIAGNOSIS — E89.40 SURGICAL MENOPAUSE ON HORMONE REPLACEMENT THERAPY: ICD-10-CM

## 2022-03-18 ENCOUNTER — PATIENT MESSAGE (OUTPATIENT)
Dept: INTERNAL MEDICINE | Facility: CLINIC | Age: 67
End: 2022-03-18
Payer: COMMERCIAL

## 2022-03-18 RX ORDER — ESTRADIOL 0.1 MG/D
1 PATCH TRANSDERMAL
Qty: 8 PATCH | Refills: 0 | Status: SHIPPED | OUTPATIENT
Start: 2022-03-21 | End: 2022-08-02 | Stop reason: SDUPTHER

## 2022-03-18 RX ORDER — ESTRADIOL 0.1 MG/D
1 PATCH TRANSDERMAL
Qty: 24 PATCH | Refills: 2 | Status: SHIPPED | OUTPATIENT
Start: 2022-03-21 | End: 2022-08-02

## 2022-03-18 NOTE — TELEPHONE ENCOUNTER
Mone Hinton.    I sent you new prescriptions for estradiol patches, a 30-day supply to fill at your local pharmacy, and a 90+ day prescription to your mail-order pharmacy. They contain a NOTE TO PHARMACIST: Substitution with generic/brand therapeutic equivalent OK.    Let me know if I can do anything else for you.     All the best,    AFRICA Plummer MD     Medications Ordered This Encounter   Medications    estradiol 0.1 mg/24 hr td ptwk (CLIMARA) 0.1 mg/24 hr PTWK     Sig: Place 1 patch onto the skin twice a week.     Dispense:  8 patch     Refill:  0     Substitution with generic/brand therapeutic equivalent OK.    estradiol 0.1 mg/24 hr td ptwk (CLIMARA) 0.1 mg/24 hr PTWK     Sig: Place 1 patch onto the skin twice a week.     Dispense:  24 patch     Refill:  2     Substitution with generic/brand therapeutic equivalent OK.

## 2022-03-29 ENCOUNTER — TELEPHONE (OUTPATIENT)
Dept: INTERNAL MEDICINE | Facility: CLINIC | Age: 67
End: 2022-03-29
Payer: COMMERCIAL

## 2022-03-29 NOTE — TELEPHONE ENCOUNTER
Spoke with [facility] and informed them of prescription instructions for estradiol patches placed by Dr. Plummer. They verbalized understanding.

## 2022-03-29 NOTE — TELEPHONE ENCOUNTER
----- Message from Gonzales Antunez sent at 3/29/2022  4:13 PM CDT -----  Andrez from [facility] called about questions with the medication for the above patient     Call back number [phone]     When call back provide reference number [id number]

## 2022-06-26 ENCOUNTER — PATIENT MESSAGE (OUTPATIENT)
Dept: INTERNAL MEDICINE | Facility: CLINIC | Age: 67
End: 2022-06-26
Payer: COMMERCIAL

## 2022-06-27 NOTE — TELEPHONE ENCOUNTER
Spoke with patient about when her symptoms started. She said that it is hard to say because she had bronchitis before the Covid-19 infection, however, the body aches started around last Tuesday. She also said that her stomach is bothering her and it is hard to eat.

## 2022-06-27 NOTE — TELEPHONE ENCOUNTER
COVID RISK SCORE = 1/15    HiMone.    I'm sorry to learn that you tested positive for COVID-19.     The COVID-19 risk score predicts a person's risk for severe illness from COVID-19 infection. Your COVID-19 risk score is 1 out of a possible total score of 15. Based on your COVID-19 risk score, the date of onset of your symptoms, and consistent with National Institutes of Health Treatment Guidelines, supportive care (rest, hydration) and symptomatic management with over-the-counter medicines is the treatment of choice for mild to moderate COVID-19 illness.     Please take the time now to visit the following websites to learn important information about how to care for yourself and protect others while you are sick:   [email or web address]   https://www.cdc.gov/coronavirus/2019-ncov/if-you-are-sick/    I'll try to answer the most frequently asked questions about what to do if you get sick with COVID-19 below. You can always find the latest information online at [email or web address].    1. Im positive for COVID-19. What do I do? (COMMENT: Because you have already completed your 5 day isolation period, the isolation instructions do not apply apply to you.)   Isolate immediately and follow CDC guidance.  o https://www.cdc.gov/coronavirus/2019-ncov/your-health/quarantine-isolation.html   Drink plenty of fluids and take over-the-counter ibuprofen or acetaminophen if and as needed for headaches, fever, and other mild symptoms.   We recommend having a thermometer to check your temperature and a pulse oximeter, which measures your oxygen level. Thermometers and pulse oximeters are available at most major retailers, including [employer], and [employer].   If you need guidance on your care or symptoms, please contact [facility] at [phone].   If you are experiencing a medical emergency, call 911.   Please take the time now to Sign up for [facility]s free COVID-19 Self-Care and  Symptom Monitoring Program.   By completing a quick form on [facility], you will be automatically enrolled into our symptom monitoring program. If your symptoms worsen, our care team will be there to guide you on the next steps and treatment options.   For patients and community members who already have a [facility] account, log in to [facility] and search for the COVID-19 Self-Care and Symptom Monitoring Program.   Once in your [facility] account, click Menu, then COVID-19, scroll down to find Start a new COVID-19 self-assessment.    2. When can I end isolation?   CDC guidance for ending isolation can be found on the CDC.gov website at the link listed below.   o https://www.cdc.gov/coronavirus/2019-ncov/your-health/quarantine-isolation.html  Here is a summary of the CDC recommendations for ending isolation. Refer to the CDC.gov website for details.   Ending isolation for people who had COVID-19 and had symptoms  o If you had COVID-19 and had symptoms, isolate for at least 5 days. To calculate your 5-day isolation period, day 0 is your first day of symptoms. Day 1 is the first full day after your symptoms developed. You can leave isolation after 5 full days (end isolation of day 6).   Ending isolation for people who tested positive for COVID-19 but had no symptoms  o If you test positive for COVID-19 and never develop symptoms, isolate for at least 5 days. Day 0 is the day of your positive viral test (based on the date you were tested) and day 1 is the first full day after the specimen was collected for your positive test. You can leave isolation after 5 full days (end isolation of day 6).   Ending isolation for people who were severely ill with COVID-19 or have a weakened immune system (immunocompromised)  o People who are severely ill with COVID-19 (including those who were hospitalized) and people with compromised immune systems might need to isolate at home longer. CDC recommends an isolation period  "of at least 10 and up to 20 days for people who were severely ill with COVID-19 and for people with weakened immune systems. Consult with your doctor about when you can resume being around other people.    3. Do I need to get re-tested for COVID-19 after I finish isolation?   Once you are infected with COVID-19 (coronavirus), your COVID-19 test result (especially PCR test results) can remain positive for up to two months after it is safe for your to end your isolation period.   Because of this, it is NOT recommended that people repeat their COVID-19 test to "test for cure" after they test positive.   A positive repeat test (especially PCR test results) within two months after you were diagnosed with COVID-19 does NOT mean that you are still infected or that you are contagious.    Thanks for trusting [facility] with your healthcare needs.    I hope you feel better soon.    Sincerely,    MARILYNN Plummer MD    "

## 2022-06-29 ENCOUNTER — PATIENT MESSAGE (OUTPATIENT)
Dept: INTERNAL MEDICINE | Facility: CLINIC | Age: 67
End: 2022-06-29
Payer: COMMERCIAL

## 2022-07-27 ENCOUNTER — PATIENT MESSAGE (OUTPATIENT)
Dept: INTERNAL MEDICINE | Facility: CLINIC | Age: 67
End: 2022-07-27
Payer: COMMERCIAL

## 2022-07-27 DIAGNOSIS — R73.02 GLUCOSE INTOLERANCE (IMPAIRED GLUCOSE TOLERANCE): ICD-10-CM

## 2022-07-27 DIAGNOSIS — E03.9 ACQUIRED HYPOTHYROIDISM: Primary | ICD-10-CM

## 2022-07-27 DIAGNOSIS — E78.2 MODERATE MIXED HYPERLIPIDEMIA NOT REQUIRING STATIN THERAPY: ICD-10-CM

## 2022-07-28 ENCOUNTER — TELEPHONE (OUTPATIENT)
Dept: INTERNAL MEDICINE | Facility: CLINIC | Age: 67
End: 2022-07-28
Payer: COMMERCIAL

## 2022-07-28 NOTE — TELEPHONE ENCOUNTER
Spoke with pt to schedule appointment for labs and colonoscopy pt states she will wait to schedule the colonoscopy.//LB

## 2022-07-28 NOTE — TELEPHONE ENCOUNTER
" LAST ENCOUNTER WITH ME:  10/26/2021   NEXT APPOINTMENT WITH ME: Visit date not found    Labs ordered as requested.    TO MY TEAM:    Help her schedule labs.   Schedule annual wellness visit with me in late October.   Enter order for colonoscopy "REFENDO" per written order guidelines (ask Edi if you need help) or execute GLENNY to obtain copy of outside colonoscopy report.    Thanks!    Orders Placed This Encounter   Procedures    TSH    T4, Free    Lipid Panel    Comprehensive Metabolic Panel      HEALTH MAINTENANCE INTERVENTIONS - DUE OR DUE SOON  Health Maintenance Due   Topic Date Due    Colorectal Cancer Screening  08/02/2021    COVID-19 Vaccine (4 - Booster for Moderna series) 03/05/2022      "

## 2022-07-28 NOTE — TELEPHONE ENCOUNTER
----- Message from [place] sent at 7/27/2022 10:33 AM CDT -----  Contact: Mone  Patient is calling to speak to the nurse regarding orders. Reports needing them done with in 3 months. Please give patient a call back at [phone]

## 2022-08-02 ENCOUNTER — PATIENT MESSAGE (OUTPATIENT)
Dept: INTERNAL MEDICINE | Facility: CLINIC | Age: 67
End: 2022-08-02
Payer: MEDICARE

## 2022-08-02 ENCOUNTER — OFFICE VISIT (OUTPATIENT)
Dept: OBSTETRICS AND GYNECOLOGY | Facility: CLINIC | Age: 67
End: 2022-08-02
Payer: MEDICARE

## 2022-08-02 VITALS
DIASTOLIC BLOOD PRESSURE: 72 MMHG | WEIGHT: 145.31 LBS | SYSTOLIC BLOOD PRESSURE: 120 MMHG | BODY MASS INDEX: 24.94 KG/M2

## 2022-08-02 DIAGNOSIS — Z12.31 SCREENING MAMMOGRAM FOR BREAST CANCER: ICD-10-CM

## 2022-08-02 DIAGNOSIS — E89.40 SURGICAL MENOPAUSE ON HORMONE REPLACEMENT THERAPY: Primary | ICD-10-CM

## 2022-08-02 DIAGNOSIS — Z79.890 SURGICAL MENOPAUSE ON HORMONE REPLACEMENT THERAPY: Primary | ICD-10-CM

## 2022-08-02 DIAGNOSIS — R32 URINARY INCONTINENCE, UNSPECIFIED TYPE: ICD-10-CM

## 2022-08-02 PROCEDURE — 99999 PR PBB SHADOW E&M-EST. PATIENT-LVL III: ICD-10-PCS | Mod: PBBFAC,,, | Performed by: OBSTETRICS & GYNECOLOGY

## 2022-08-02 PROCEDURE — 99203 OFFICE O/P NEW LOW 30 MIN: CPT | Mod: S$PBB,,, | Performed by: OBSTETRICS & GYNECOLOGY

## 2022-08-02 PROCEDURE — 99999 PR PBB SHADOW E&M-EST. PATIENT-LVL III: CPT | Mod: PBBFAC,,, | Performed by: OBSTETRICS & GYNECOLOGY

## 2022-08-02 PROCEDURE — 99213 OFFICE O/P EST LOW 20 MIN: CPT | Mod: PBBFAC | Performed by: OBSTETRICS & GYNECOLOGY

## 2022-08-02 PROCEDURE — 99203 PR OFFICE/OUTPT VISIT, NEW, LEVL III, 30-44 MIN: ICD-10-PCS | Mod: S$PBB,,, | Performed by: OBSTETRICS & GYNECOLOGY

## 2022-08-02 PROCEDURE — 81002 URINALYSIS NONAUTO W/O SCOPE: CPT | Mod: PBBFAC | Performed by: OBSTETRICS & GYNECOLOGY

## 2022-08-02 RX ORDER — GUAIFENESIN AND CODEINE PHOSPHATE 10; 100 MG/5ML; MG/5ML
LIQUID ORAL
COMMUNITY
Start: 2022-04-08 | End: 2022-08-02

## 2022-08-02 RX ORDER — CEFDINIR 300 MG/1
300 CAPSULE ORAL 2 TIMES DAILY
COMMUNITY
Start: 2022-06-28 | End: 2022-08-02

## 2022-08-02 RX ORDER — ESTRADIOL 0.1 MG/D
1 PATCH TRANSDERMAL
Qty: 8 PATCH | Refills: 12 | Status: SHIPPED | OUTPATIENT
Start: 2022-08-04 | End: 2022-08-02 | Stop reason: SDUPTHER

## 2022-08-02 RX ORDER — PREDNISONE 20 MG/1
40 TABLET ORAL DAILY
COMMUNITY
Start: 2022-04-08 | End: 2022-08-02

## 2022-08-02 RX ORDER — BENZONATATE 200 MG/1
200 CAPSULE ORAL EVERY 6 HOURS PRN
COMMUNITY
Start: 2022-04-08 | End: 2022-08-02

## 2022-08-02 RX ORDER — BROMPHENIRAMINE MALEATE, PSEUDOEPHEDRINE HYDROCHLORIDE, AND DEXTROMETHORPHAN HYDROBROMIDE 2; 30; 10 MG/5ML; MG/5ML; MG/5ML
SYRUP ORAL
COMMUNITY
Start: 2022-04-08 | End: 2022-08-02

## 2022-08-02 RX ORDER — ESTRADIOL 0.1 MG/D
1 PATCH TRANSDERMAL
Qty: 8 PATCH | Refills: 12 | Status: SHIPPED | OUTPATIENT
Start: 2022-08-04 | End: 2022-11-28

## 2022-08-02 NOTE — PROGRESS NOTES
Subjective:       Patient ID: Mone Best is a 66 y.o. female.    Chief Complaint:  Medication Refill      History of Present Illness  HPI  Hormone Replacement Counseling  Patient presents to discuss hormone replacement therapy. Patient is requesting hormone replacement therapy due to hot flashes and moodiness. The patient is taking hormone replacement therapy. Patient denies post-menopausal vaginal bleeding. The patient is sexually active. GYN screening history: last pap: was normal and last mammogram: approximate date  and was normal. Patient is hormone deficient due to hysterectomy with BSO, which occurred over 20 yrs ago.  Pt has been getting Estrogen patches and Prometrium from her prior Gyn and would like to discuss getting a refill.  Requests UA.  Has long history of urinary incontinence and is s/p bladder suspension and A+P repair.  Feels that symptoms are worsening and requests Urology referral.      Current hormone therapy:   Estrogen and Progesterone      GYN & OB History  No LMP recorded. Patient has had a hysterectomy.   Date of Last Pap: No result found    OB History    Para Term  AB Living   2 2 2     2   SAB IAB Ectopic Multiple Live Births           2      # Outcome Date GA Lbr Ga/2nd Weight Sex Delivery Anes PTL Lv   2 Term         ANATOLIY   1 Term         ANATOLIY       Review of Systems  Review of Systems   Constitutional: Negative for activity change, appetite change, chills, fatigue, fever and unexpected weight change.   Respiratory: Negative for shortness of breath.    Cardiovascular: Negative for chest pain, palpitations and leg swelling.   Gastrointestinal: Negative for abdominal pain, bloating, blood in stool, constipation, diarrhea, nausea and vomiting.   Genitourinary: Positive for hot flashes. Negative for dyspareunia, dysuria, flank pain, frequency, genital sores, hematuria, pelvic pain, urgency, vaginal bleeding, vaginal discharge, vaginal pain, urinary incontinence,  postcoital bleeding, vaginal dryness and vaginal odor.   Musculoskeletal: Negative for back pain.   Integumentary:  Negative for breast mass, nipple discharge, breast skin changes and breast tenderness.   Neurological: Negative for syncope and headaches.   Breast: Negative for asymmetry, lump, mass, mastodynia, nipple discharge, skin changes and tenderness          Objective:    Physical Exam:   Constitutional: She is oriented to person, place, and time. She appears well-developed and well-nourished. No distress.               Genitourinary:    Genitourinary Comments: UA today Negative                 Neurological: She is alert and oriented to person, place, and time.     Psychiatric: She has a normal mood and affect. Her behavior is normal. Thought content normal.          Assessment:        1. Surgical menopause on hormone replacement therapy    2. Screening mammogram for breast cancer    3. Urinary incontinence, unspecified type             Plan:      Surgical menopause on hormone replacement therapy  -     estradiol 0.1 mg/24 hr td ptwk (CLIMARA) 0.1 mg/24 hr PTWK; Place 1 patch onto the skin twice a week.  Dispense: 8 patch; Refill: 12  -     Pt was extensively counseled on menopause, including current treatment recommendations.  In particular, recommendation to cease HRT use by age 64 yo was discussed.  Risks associated with continued HRT use (including increased risk of heart disease, cardiac event, breast cancer, stoke, VTE, etc) and limited benefits of use past age 64 yo were reviewed.  Medical history was reviewed and pt does not have contraindications for HRT use other than age.  Lastly, pt was advised on possibility that insurance may not cover the cost of HRT in this scenario.  Pt voiced understanding and expressed desire to continue HRT use at her current dose as symptoms off of HRT are significant.  OK to proceed with HRT at this time, however, pt was advised to consider tapering off of hormones in  near future.  Pt again voiced understanding.  -     Pt had a hysterectomy and does not need progesterone therapy.  Pt advised to discontinue use.  -     Pt was counseled on cervical/vaginal screening guidelines and recommendations.  As per current recommendations, pt no longer requires routine pap screening or routine screening pelvic examinations.  If pt still desires screening Gyn exams, would recommend minimum 2 year interval.  Pt may follow with PCP for routine health maintenance needs.    Screening mammogram for breast cancer  -     Mammo Digital Screening Bilat; Future; Expected date: 08/02/2022  -     Pt was advised on current breast cancer screening recommendations.  Pt desires to proceed with screening MMG.    Urinary incontinence, unspecified type  -     Ambulatory referral/consult to Urology; Future; Expected date: 08/09/2022  -     UA today negative.  Pt is s/p bladder lift and A+P repair and is noting worsening symptoms.  Counseled on options.  Requests Urology referral.      Follow up in about 1 year (around 8/2/2023).

## 2022-08-09 ENCOUNTER — LAB VISIT (OUTPATIENT)
Dept: LAB | Facility: HOSPITAL | Age: 67
End: 2022-08-09
Attending: FAMILY MEDICINE
Payer: MEDICARE

## 2022-08-09 DIAGNOSIS — E78.2 MODERATE MIXED HYPERLIPIDEMIA NOT REQUIRING STATIN THERAPY: ICD-10-CM

## 2022-08-09 DIAGNOSIS — E03.9 ACQUIRED HYPOTHYROIDISM: ICD-10-CM

## 2022-08-09 LAB
ALBUMIN SERPL BCP-MCNC: 3.8 G/DL (ref 3.5–5.2)
ALP SERPL-CCNC: 42 U/L (ref 55–135)
ALT SERPL W/O P-5'-P-CCNC: 9 U/L (ref 10–44)
ANION GAP SERPL CALC-SCNC: 8 MMOL/L (ref 8–16)
AST SERPL-CCNC: 10 U/L (ref 10–40)
BILIRUB SERPL-MCNC: 0.5 MG/DL (ref 0.1–1)
BUN SERPL-MCNC: 11 MG/DL (ref 8–23)
CALCIUM SERPL-MCNC: 9.5 MG/DL (ref 8.7–10.5)
CHLORIDE SERPL-SCNC: 107 MMOL/L (ref 95–110)
CHOLEST SERPL-MCNC: 174 MG/DL (ref 120–199)
CHOLEST/HDLC SERPL: 4 {RATIO} (ref 2–5)
CO2 SERPL-SCNC: 25 MMOL/L (ref 23–29)
CREAT SERPL-MCNC: 0.7 MG/DL (ref 0.5–1.4)
EST. GFR  (NO RACE VARIABLE): >60 ML/MIN/1.73 M^2
GLUCOSE SERPL-MCNC: 98 MG/DL (ref 70–110)
HDLC SERPL-MCNC: 44 MG/DL (ref 40–75)
HDLC SERPL: 25.3 % (ref 20–50)
LDLC SERPL CALC-MCNC: 110 MG/DL (ref 63–159)
NONHDLC SERPL-MCNC: 130 MG/DL
POTASSIUM SERPL-SCNC: 4.6 MMOL/L (ref 3.5–5.1)
PROT SERPL-MCNC: 6.3 G/DL (ref 6–8.4)
SODIUM SERPL-SCNC: 140 MMOL/L (ref 136–145)
T4 FREE SERPL-MCNC: 0.85 NG/DL (ref 0.71–1.51)
TRIGL SERPL-MCNC: 100 MG/DL (ref 30–150)
TSH SERPL DL<=0.005 MIU/L-ACNC: 0.04 UIU/ML (ref 0.4–4)

## 2022-08-09 PROCEDURE — 84443 ASSAY THYROID STIM HORMONE: CPT | Performed by: FAMILY MEDICINE

## 2022-08-09 PROCEDURE — 80053 COMPREHEN METABOLIC PANEL: CPT | Performed by: FAMILY MEDICINE

## 2022-08-09 PROCEDURE — 36415 COLL VENOUS BLD VENIPUNCTURE: CPT | Performed by: FAMILY MEDICINE

## 2022-08-09 PROCEDURE — 80061 LIPID PANEL: CPT | Performed by: FAMILY MEDICINE

## 2022-08-09 PROCEDURE — 84439 ASSAY OF FREE THYROXINE: CPT | Performed by: FAMILY MEDICINE

## 2022-09-19 DIAGNOSIS — Z00.00 ROUTINE GENERAL MEDICAL EXAMINATION AT A HEALTH CARE FACILITY: Primary | ICD-10-CM

## 2022-10-17 ENCOUNTER — HOSPITAL ENCOUNTER (OUTPATIENT)
Dept: RADIOLOGY | Facility: HOSPITAL | Age: 67
Discharge: HOME | End: 2022-10-17
Attending: OBSTETRICS & GYNECOLOGY
Payer: MEDICARE

## 2022-10-17 VITALS — HEIGHT: 64 IN | BODY MASS INDEX: 24.94 KG/M2

## 2022-10-17 DIAGNOSIS — Z12.31 SCREENING MAMMOGRAM FOR BREAST CANCER: ICD-10-CM

## 2022-10-17 PROCEDURE — 77067 MAMMO DIGITAL SCREENING BILAT WITH TOMO: ICD-10-PCS | Mod: 26,,, | Performed by: RADIOLOGY

## 2022-10-17 PROCEDURE — 77063 BREAST TOMOSYNTHESIS BI: CPT | Mod: TC

## 2022-10-17 PROCEDURE — 77063 MAMMO DIGITAL SCREENING BILAT WITH TOMO: ICD-10-PCS | Mod: 26,,, | Performed by: RADIOLOGY

## 2022-10-17 PROCEDURE — 77063 BREAST TOMOSYNTHESIS BI: CPT | Mod: 26,,, | Performed by: RADIOLOGY

## 2022-10-17 PROCEDURE — 77067 SCR MAMMO BI INCL CAD: CPT | Mod: 26,,, | Performed by: RADIOLOGY

## 2022-10-21 ENCOUNTER — OFFICE VISIT (OUTPATIENT)
Dept: INTERNAL MEDICINE | Facility: CLINIC | Age: 67
End: 2022-10-21
Payer: MEDICARE

## 2022-10-21 ENCOUNTER — HOSPITAL ENCOUNTER (OUTPATIENT)
Dept: CARDIOLOGY | Facility: HOSPITAL | Age: 67
Discharge: HOME | End: 2022-10-21
Attending: FAMILY MEDICINE
Payer: MEDICARE

## 2022-10-21 ENCOUNTER — CLINICAL SUPPORT (OUTPATIENT)
Dept: INTERNAL MEDICINE | Facility: CLINIC | Age: 67
End: 2022-10-21
Payer: MEDICARE

## 2022-10-21 VITALS
HEIGHT: 64 IN | WEIGHT: 138.88 LBS | DIASTOLIC BLOOD PRESSURE: 71 MMHG | HEART RATE: 71 BPM | SYSTOLIC BLOOD PRESSURE: 120 MMHG | TEMPERATURE: 98 F | BODY MASS INDEX: 23.71 KG/M2 | OXYGEN SATURATION: 99 %

## 2022-10-21 DIAGNOSIS — E03.9 HYPOTHYROIDISM, UNSPECIFIED TYPE: ICD-10-CM

## 2022-10-21 DIAGNOSIS — M85.80 OSTEOPENIA, UNSPECIFIED LOCATION: ICD-10-CM

## 2022-10-21 DIAGNOSIS — Z00.00 ROUTINE GENERAL MEDICAL EXAMINATION AT A HEALTH CARE FACILITY: ICD-10-CM

## 2022-10-21 DIAGNOSIS — E78.5 HYPERLIPIDEMIA, UNSPECIFIED HYPERLIPIDEMIA TYPE: ICD-10-CM

## 2022-10-21 DIAGNOSIS — Z12.11 SCREENING FOR COLORECTAL CANCER: ICD-10-CM

## 2022-10-21 DIAGNOSIS — D64.9 ANEMIA, UNSPECIFIED TYPE: Primary | ICD-10-CM

## 2022-10-21 DIAGNOSIS — E03.9 ACQUIRED HYPOTHYROIDISM: Chronic | ICD-10-CM

## 2022-10-21 DIAGNOSIS — Z87.19 HISTORY OF DIVERTICULITIS: ICD-10-CM

## 2022-10-21 DIAGNOSIS — Z00.00 PREVENTATIVE HEALTH CARE: Primary | ICD-10-CM

## 2022-10-21 DIAGNOSIS — Z86.010 HISTORY OF COLONIC POLYPS: ICD-10-CM

## 2022-10-21 DIAGNOSIS — R73.09 OTHER ABNORMAL GLUCOSE: ICD-10-CM

## 2022-10-21 DIAGNOSIS — Z13.1 SCREENING FOR DIABETES MELLITUS: ICD-10-CM

## 2022-10-21 DIAGNOSIS — Z12.12 SCREENING FOR COLORECTAL CANCER: ICD-10-CM

## 2022-10-21 LAB
ALBUMIN SERPL BCP-MCNC: 3.8 G/DL (ref 3.5–5.2)
ALP SERPL-CCNC: 46 U/L (ref 55–135)
ALT SERPL W/O P-5'-P-CCNC: 8 U/L (ref 10–44)
ANION GAP SERPL CALC-SCNC: 8 MMOL/L (ref 8–16)
AST SERPL-CCNC: 10 U/L (ref 10–40)
BILIRUB SERPL-MCNC: 0.4 MG/DL (ref 0.1–1)
BUN SERPL-MCNC: 17 MG/DL (ref 8–23)
CALCIUM SERPL-MCNC: 9.3 MG/DL (ref 8.7–10.5)
CHLORIDE SERPL-SCNC: 105 MMOL/L (ref 95–110)
CHOLEST SERPL-MCNC: 193 MG/DL (ref 120–199)
CHOLEST/HDLC SERPL: 4.3 {RATIO} (ref 2–5)
CO2 SERPL-SCNC: 26 MMOL/L (ref 23–29)
CREAT SERPL-MCNC: 0.7 MG/DL (ref 0.5–1.4)
ERYTHROCYTE [DISTWIDTH] IN BLOOD BY AUTOMATED COUNT: 12.8 % (ref 11.5–14.5)
EST. GFR  (NO RACE VARIABLE): >60 ML/MIN/1.73 M^2
ESTIMATED AVG GLUCOSE: 94 MG/DL (ref 68–131)
GLUCOSE SERPL-MCNC: 95 MG/DL (ref 70–110)
HBA1C MFR BLD: 4.9 % (ref 4–5.6)
HCT VFR BLD AUTO: 40.5 % (ref 37–48.5)
HDLC SERPL-MCNC: 45 MG/DL (ref 40–75)
HDLC SERPL: 23.3 % (ref 20–50)
HGB BLD-MCNC: 14 G/DL (ref 12–16)
LDLC SERPL CALC-MCNC: 124.4 MG/DL (ref 63–159)
MCH RBC QN AUTO: 30.4 PG (ref 27–31)
MCHC RBC AUTO-ENTMCNC: 34.6 G/DL (ref 32–36)
MCV RBC AUTO: 88 FL (ref 82–98)
NONHDLC SERPL-MCNC: 148 MG/DL
PLATELET # BLD AUTO: 296 K/UL (ref 150–450)
PMV BLD AUTO: 9.5 FL (ref 9.2–12.9)
POTASSIUM SERPL-SCNC: 4.3 MMOL/L (ref 3.5–5.1)
PROT SERPL-MCNC: 6.3 G/DL (ref 6–8.4)
RBC # BLD AUTO: 4.61 M/UL (ref 4–5.4)
SODIUM SERPL-SCNC: 139 MMOL/L (ref 136–145)
T4 FREE SERPL-MCNC: 0.86 NG/DL (ref 0.71–1.51)
T4 FREE SERPL-MCNC: 0.88 NG/DL (ref 0.71–1.51)
TRIGL SERPL-MCNC: 118 MG/DL (ref 30–150)
TSH SERPL DL<=0.005 MIU/L-ACNC: 0.32 UIU/ML (ref 0.4–4)
WBC # BLD AUTO: 7.44 K/UL (ref 3.9–12.7)

## 2022-10-21 PROCEDURE — 80053 COMPREHEN METABOLIC PANEL: CPT | Performed by: FAMILY MEDICINE

## 2022-10-21 PROCEDURE — 93005 ELECTROCARDIOGRAM TRACING: CPT

## 2022-10-21 PROCEDURE — 99999 PR PBB SHADOW E&M-EST. PATIENT-LVL V: ICD-10-PCS | Mod: PBBFAC,,, | Performed by: FAMILY MEDICINE

## 2022-10-21 PROCEDURE — 99397 PER PM REEVAL EST PAT 65+ YR: CPT | Mod: S$PBB,,, | Performed by: FAMILY MEDICINE

## 2022-10-21 PROCEDURE — 99999 PR PBB SHADOW E&M-EST. PATIENT-LVL V: CPT | Mod: PBBFAC,,, | Performed by: FAMILY MEDICINE

## 2022-10-21 PROCEDURE — 99215 OFFICE O/P EST HI 40 MIN: CPT | Mod: PBBFAC | Performed by: FAMILY MEDICINE

## 2022-10-21 PROCEDURE — 85027 COMPLETE CBC AUTOMATED: CPT | Performed by: FAMILY MEDICINE

## 2022-10-21 PROCEDURE — 99397 PR PREVENTIVE VISIT,EST,65 & OVER: ICD-10-PCS | Mod: S$PBB,,, | Performed by: FAMILY MEDICINE

## 2022-10-21 PROCEDURE — 83036 HEMOGLOBIN GLYCOSYLATED A1C: CPT | Performed by: FAMILY MEDICINE

## 2022-10-21 PROCEDURE — 84443 ASSAY THYROID STIM HORMONE: CPT | Performed by: FAMILY MEDICINE

## 2022-10-21 PROCEDURE — 80061 LIPID PANEL: CPT | Performed by: FAMILY MEDICINE

## 2022-10-21 PROCEDURE — 93010 ELECTROCARDIOGRAM REPORT: CPT | Mod: ,,, | Performed by: INTERNAL MEDICINE

## 2022-10-21 PROCEDURE — 84439 ASSAY OF FREE THYROXINE: CPT | Performed by: FAMILY MEDICINE

## 2022-10-21 PROCEDURE — 93010 EKG 12-LEAD: ICD-10-PCS | Mod: ,,, | Performed by: INTERNAL MEDICINE

## 2022-10-21 RX ORDER — CALCIUM CARBONATE 500(1250)
2 TABLET ORAL DAILY
Qty: 180 TABLET | Refills: 4 | Status: SHIPPED | OUTPATIENT
Start: 2022-10-21 | End: 2024-02-13

## 2022-10-21 RX ORDER — LIOTHYRONINE SODIUM 5 UG/1
5 TABLET ORAL 2 TIMES DAILY
Qty: 180 TABLET | Refills: 3 | Status: SHIPPED | OUTPATIENT
Start: 2022-10-21 | End: 2023-02-16 | Stop reason: SDUPTHER

## 2022-10-21 RX ORDER — LEVOTHYROXINE SODIUM 88 UG/1
88 TABLET ORAL
Qty: 90 TABLET | Refills: 3 | Status: SHIPPED | OUTPATIENT
Start: 2022-10-21 | End: 2023-02-21 | Stop reason: SDUPTHER

## 2022-10-21 NOTE — LETTER
Dear Mone,    I enjoyed seeing you again at your recent [facility] exam.    This letter and the accompanying reports will summarize your medical history, physical exam findings, and test results.    I'm happy to report that your recent test results are fine except for your TSH (thyroid stimulating hormone). Although it is improved from August, is still mildly low. I recommend that we re-check your TSH and Free T4 a few days before your next appointment with me on 2023 at 7:30 AM.    Please call our appointment desk at [phone] to schedule that lab appointment. If you have any difficulty, send my staff a message, and they will be glad to help.     Please send me a [facility] message if I can answer any questions or do anything else for you.    Thanks for letting me care for you and trusting [facility] with your [facility] needs.    All the best,     AFRICA Plummer MD     ENCLOSURES       Test Results Summary for Mone CONCEPCION Estephania,  1955       CBC: The complete blood count (CBC) checks for anemia and measures your blood's red blood cells, white blood cells, and platelets.  o YOUR RESULTS: Normal     CMP: The comprehensive metabolic panel (CMP) checks kidney function, liver function, sodium, potassium, glucose (sugar), and other aspects of your metabolism.  o YOUR RESULTS: Normal or at least acceptable. No further evaluation or treatment is needed at this point.     Lipid Panel: The lipid panel measures the levels of different fatty substances in your blood (cholesterol and triglycerides) that can contribute to plaque buildup in your arteries (atherosclerosis).  o YOUR RESULTS: Normal     A1c: The hemoglobin A1c (A1c) is a test that evaluates and screens for diabetes.  o YOUR RESULTS: Normal     TSH: The thyroid is a gland in the neck that helps regulate metabolism. The thyroid stimulating hormone (TSH) is a measure of your thyroid activity.   T4 (Free T4):  T4 is a hormone produced by the thyroid gland. Measurement of T4 is helpful is evaluating for overactive or underactive thyroid.  o YOUR RESULTS: Your Free T4 is normal. Your TSH level is improved from your TSH level in August, but it is still mildly low. This suggests that we may need to reduce your dose of levothyroxine (Synthroid).  o RECOMMENDATION: Please call our appointment desk [phone] to schedule a lab appointment for repeat TSH and Free T4 labs to be done a few days before your next appointment with me on Tuesday, February 21, 2023 at 7:30 AM. If you have any difficulty, send my staff a message, and they will be glad to help.      Electrocardiogram: The electrocardiogram (also referred to as ECG or EKG) is a non-invasive test that measures the electrical activity of the heart's conduction system.  o YOUR RESULTS: Normal

## 2022-10-21 NOTE — PROGRESS NOTES
[facility] ENCOUNTER  10/21/22      REASON FOR VISIT  [facility]    PCP (Primary Care Provider)  I am Mone's PCP.    Mone reports that her chronic conditions are stable, and she is without complaint other than some mild osteoarthritis pains and occasional mild tension-type headaches. She says she is doing well on her current medicines, she reports preceiving no adverse side-effects and wants to continue current treatment.    ASSESSMENT & PLAN  1. Preventative health care    2. Acquired hypothyroidism  -     T4, FREE; Future; Expected date: 10/21/2022  -     liothyronine (CYTOMEL) 5 MCG Tab; Take 1 tablet (5 mcg total) by mouth 2 (two) times a day.  Dispense: 180 tablet; Refill: 3  -     levothyroxine (SYNTHROID) 88 MCG tablet; Take 1 tablet (88 mcg total) by mouth before breakfast.  Dispense: 90 tablet; Refill: 3    3. Osteopenia, unspecified location  Overview:  DEXA BONE DENSITY SPINE HIP 10/12/2021  FINDINGS: The T score associated with the lumbar spine is 0.1 on the current examination. It was -0.6 on the prior examination. The T score associated with the left femoral neck is -1.5 on the current examination. It was -1.7 on the prior examination. The T score associated with the right femoral neck is -1.8 on the current examination. This area was not studied on the prior examination.  Impression: Osteopenia    Orders:  -     calcium carbonate (OS-JUANITO) 500 mg calcium (1,250 mg) tablet; Take 2 tablets (1,000 mg total) by mouth once daily.  Dispense: 180 tablet; Refill: 4    4. History of colonic polyps  -     Ambulatory referral/consult to Endoscopy Procedure     5. Screening for colorectal cancer  -     Ambulatory referral/consult to Endoscopy Procedure     6. History of diverticulitis  -     Ambulatory referral/consult to Gastroenterology    PHYSICAL EXAM  Vitals:    10/21/22 0828   BP: 120/71   BP Location: Left arm   Patient Position: Sitting   BP Method: Large (Manual)   Pulse:  "71   Temp: 97.7 °F (36.5 °C)   TempSrc: Tympanic   SpO2: 99%   Weight: 63 kg (138 lb 14.2 oz)   Height: 5' 4" (1.626 m)   CONSTITUTIONAL: No apparent distress. Normal appearance.   EYES: No scleral icterus. Conjunctivae unremarkable.  ENT: Right tympanic membrane, ear canal and external ear normal. Left tympanic membrane, ear canal and external ear normal.   NECK: No thyroid mass, thyromegaly or thyroid tenderness. No carotid bruit.   CARDIOVASCULAR: Normal rate and regular rhythm. Normal heart sounds.   PULMONARY: Pulmonary effort is normal. No respiratory distress. Normal breath sounds. No wheezing or rhonchi.   ABDOMINAL: Bowel sounds are normal. There is no distension. Abdomen is soft. There is no mass. There is no abdominal tenderness.   SKIN: Skin is warm and dry. Skin is not jaundiced.   NEUROLOGICAL: No focal deficit apparent. Alert, oriented to person, place, and time.   PSYCHIATRIC: Normal mood. Normal behavior. Judgment overtly normal.     MEDICATIONS  Mone has a current medication list which includes the following prescription(s): cyanocobalamin/cobamamide, levocarnitine tartrate, multivitamin, polyethylene glycol, turmeric, calcium carbonate, estradiol 0.1 mg/24 hr td ptwk, levothyroxine, liothyronine, and mirabegron.    HEALTH MAINTENANCE AND SCREENINGS - UP TO DATE  Health Maintenance Topics with due status: Not Due       Topic Last Completion Date    TETANUS VACCINE 03/10/2020    DEXA Scan 10/12/2021    Mammogram 10/17/2022    Hemoglobin A1c (Prediabetes) 10/21/2022    Lipid Panel 10/21/2022     HEALTH MAINTENANCE AND SCREENINGS - DUE OR DUE SOON  Health Maintenance Due   Topic Date Due    Colorectal Cancer Screening  08/02/2021    COVID-19 Vaccine (4 - Booster for Moderna series) 12/31/2021    Pneumococcal Vaccines (Age 65+) (2 - PCV) 10/26/2022     FOLLOW-UP  Mone is to follow up with me for any health problems or concerns, any abnormal test results, and any age-appropriate health maintenance " "interventions and screenings that may be due.    PAST MEDICAL HISTORY  Mone has a past medical history of Constipation, chronic, Diverticulosis, Glucose intolerance (impaired glucose tolerance), Hyperlipidemia, Osteopenia, PSVT (paroxysmal supraventricular tachycardia), Stress incontinence, and Thyroid disease.    SURGICAL HISTORY  Mone has a past surgical history that includes Hysterectomy; Vaginal hysterectomy w/ anterior and posterior vaginal repair; Breast surgery; Tonsillectomy; Appendectomy; Bladder suspension; Colonoscopy w/ biopsies; Oophorectomy; and Breast biopsy (Right).    FAMILY HISTORY  Mone family history includes Depression in her mother; Diabetes in her father and mother; Hypertension in her father.     ALLERGIES  Mone reports she is allergic to levaquin [levofloxacin].    SOCIAL HISTORY  Mone  reports that she has never smoked. She has never used smokeless tobacco. She reports that she does not drink alcohol and does not use drugs.     Documentation entered by me for this encounter may have been done in part using speech-recognition technology. Although I have made an effort to ensure accuracy, "sound like" errors may exist and should be interpreted in context.   "

## 2022-10-26 ENCOUNTER — HOSPITAL ENCOUNTER (OUTPATIENT)
Dept: PREADMISSION TESTING | Facility: HOSPITAL | Age: 67
Discharge: HOME | End: 2022-10-26
Attending: FAMILY MEDICINE
Payer: MEDICARE

## 2022-10-26 DIAGNOSIS — Z86.010 HISTORY OF COLONIC POLYPS: ICD-10-CM

## 2022-10-26 DIAGNOSIS — Z12.12 SCREENING FOR COLORECTAL CANCER: Primary | ICD-10-CM

## 2022-10-26 DIAGNOSIS — Z12.11 SCREENING FOR COLORECTAL CANCER: Primary | ICD-10-CM

## 2022-10-26 RX ORDER — POLYETHYLENE GLYCOL 3350, SODIUM SULFATE ANHYDROUS, SODIUM BICARBONATE, SODIUM CHLORIDE, POTASSIUM CHLORIDE 236; 22.74; 6.74; 5.86; 2.97 G/4L; G/4L; G/4L; G/4L; G/4L
4 POWDER, FOR SOLUTION ORAL ONCE
Qty: 4000 ML | Refills: 0 | Status: SHIPPED | OUTPATIENT
Start: 2022-10-26 | End: 2022-10-26

## 2022-11-06 NOTE — PROGRESS NOTES
"Results OK.  ----------  If you want to learn more about your test results and what they mean, it's as simple as 1, 2, 3.  1. Log in to [facility] using the [facility] or online at [email or web address].   2. From the "Test Results" tab, click on the test you want to know more about.  3. If using the mobile [place], click the "Get Info" icon. (The "Get Info" icon looks like a [place] with a lower case letter i inside it.) If using your computer, click the "Get Info" icon or the "About This Test" link."

## 2022-11-11 ENCOUNTER — OFFICE VISIT (OUTPATIENT)
Dept: UROLOGY | Facility: CLINIC | Age: 67
End: 2022-11-11
Payer: MEDICARE

## 2022-11-11 VITALS
SYSTOLIC BLOOD PRESSURE: 110 MMHG | WEIGHT: 141.13 LBS | BODY MASS INDEX: 24.22 KG/M2 | DIASTOLIC BLOOD PRESSURE: 70 MMHG

## 2022-11-11 DIAGNOSIS — N81.6 RECTOCELE: ICD-10-CM

## 2022-11-11 DIAGNOSIS — R32 URINARY INCONTINENCE, UNSPECIFIED TYPE: ICD-10-CM

## 2022-11-11 DIAGNOSIS — N39.46 MIXED INCONTINENCE: Primary | ICD-10-CM

## 2022-11-11 DIAGNOSIS — K59.09 CHRONIC CONSTIPATION: ICD-10-CM

## 2022-11-11 PROCEDURE — 99204 PR OFFICE/OUTPT VISIT, NEW, LEVL IV, 45-59 MIN: ICD-10-PCS | Mod: S$PBB,,, | Performed by: UROLOGY

## 2022-11-11 PROCEDURE — 99999 PR PBB SHADOW E&M-EST. PATIENT-LVL III: ICD-10-PCS | Mod: PBBFAC,,, | Performed by: UROLOGY

## 2022-11-11 PROCEDURE — 51798 US URINE CAPACITY MEASURE: CPT | Mod: PBBFAC,PN | Performed by: UROLOGY

## 2022-11-11 PROCEDURE — 99213 OFFICE O/P EST LOW 20 MIN: CPT | Mod: PBBFAC,PN | Performed by: UROLOGY

## 2022-11-11 PROCEDURE — 99999 PR PBB SHADOW E&M-EST. PATIENT-LVL III: CPT | Mod: PBBFAC,,, | Performed by: UROLOGY

## 2022-11-11 PROCEDURE — 99204 OFFICE O/P NEW MOD 45 MIN: CPT | Mod: S$PBB,,, | Performed by: UROLOGY

## 2022-11-11 NOTE — PROGRESS NOTES
CC: urinary incontinence    Referring Provider: Dr. Lalo Doe      History of Present Illness:       11/11/22- 68yo female here for evaluation of urinary incontinence. She has a prior h/o A+P repair (no mesh). Also had a bladder lift during hysterectomy.   She reports urge incontinence. Wears poise pads, size 4. Sometimes she pushes with the toilet paper to fully empty her bladder. She splints to have a BM. Occasional PRABHJOT. UUI>PRABHJOT. Hasn't been on any bladder meds. No UTIs, dysuria or gross hematuria. No feelings of incomplete emptying. Takes MiraLax daily for constipation.   Symptoms are worsened by drinking caffeine or drinking cold water.     Review of Systems   Constitutional:  Negative for chills and fever.   Respiratory:  Negative for shortness of breath.    Cardiovascular:  Negative for chest pain.   Gastrointestinal:  Positive for constipation.   Genitourinary:  Positive for urgency. Negative for difficulty urinating.   All other systems reviewed and are negative.      Past Medical History:   Diagnosis Date    Constipation, chronic     Diverticulosis     Glucose intolerance (impaired glucose tolerance) 5/3/2013    Hyperlipidemia 5/3/2013    Osteopenia     PSVT (paroxysmal supraventricular tachycardia)     Stress incontinence     Thyroid disease        Past Surgical History:   Procedure Laterality Date    APPENDECTOMY      BLADDER SUSPENSION      BREAST BIOPSY Right     fibroadenoma    BREAST SURGERY      R breast biopsy- benign (removed fibroid adenoma)    COLONOSCOPY W/ BIOPSIES      polyps were negative    HYSTERECTOMY      OOPHORECTOMY      TONSILLECTOMY      VAGINAL HYSTERECTOMY W/ ANTERIOR AND POSTERIOR VAGINAL REPAIR         Family History   Problem Relation Age of Onset    Depression Mother     Diabetes Mother     Diabetes Father     Hypertension Father     Melanoma Neg Hx        Social History     Tobacco Use    Smoking status: Never    Smokeless tobacco: Never   Substance Use Topics    Alcohol  use: No    Drug use: No       Current Outpatient Medications   Medication Sig Dispense Refill    calcium carbonate (OS-JUANITO) 500 mg calcium (1,250 mg) tablet Take 2 tablets (1,000 mg total) by mouth once daily. 180 tablet 4    CYANOCOBALAMIN/COBAMAMIDE (B12 SL) Place 2,500 mcg under the tongue once daily.      estradiol 0.1 mg/24 hr td ptwk (CLIMARA) 0.1 mg/24 hr PTWK Place 1 patch onto the skin twice a week. 8 patch 12    LEVOCARNITINE TARTRATE (CARNITINE, TARTRATE, ORAL) Take by mouth once daily.       levothyroxine (SYNTHROID) 88 MCG tablet Take 1 tablet (88 mcg total) by mouth before breakfast. 90 tablet 3    liothyronine (CYTOMEL) 5 MCG Tab Take 1 tablet (5 mcg total) by mouth 2 (two) times a day. 180 tablet 3    multivitamin (THERAGRAN) per tablet Take 1 tablet by mouth 2 (two) times daily.      polyethylene glycol (GLYCOLAX) 17 gram/dose powder Take 17 g by mouth once daily.      TURMERIC (CURCUMIN MISC) Take by mouth once daily.      mirabegron (MYRBETRIQ) 50 mg Tb24 Take 1 tablet (50 mg total) by mouth Daily. 90 tablet 1     No current facility-administered medications for this visit.       Review of patient's allergies indicates:   Allergen Reactions    Levaquin [levofloxacin] Nausea And Vomiting       Physical Exam  Vitals:    11/11/22 1155   BP: 110/70     General: Well-developed, well-nourished, in no acute distress  HEENT: Normocephalic, atraumatic, extraocular movements intact  Neck: Supple, no supraclavicular or cervical lymphadenopathy, trachea midline  Respirations: even and unlabored  Back: midline spine, No CVA tenderness  Abdomen: soft, Non-tender, non-distended, no palpable masses, no rebound or guarding, well-healed pfannenstiel scar  : Normal external female genitalia without lesions. Orthotopic urethral meatus. atrophic vaginal mucosa. Grade 1 Cysotcele, Grade 2 Rectocele, Grade 1 apical prolapse, negative cough stress test, + urethral hypermobility  Extremities: moves all equally, no  clubbing, cyanosis or edema  Skin: Warm and dry. No lesions  Psych: normal affect  Neuro: Alert and oriented x 3. Cranial nerves II-XII intact    PVR: 0cc 11/11/22    Urinalysis  Negative for blood, LE, nit      Assessment:  1. Mixed incontinence        2. Rectocele        3. Urinary incontinence, unspecified type  Ambulatory referral/consult to Urology      4. Chronic constipation              Plan:   Mixed incontinence    Rectocele    Urinary incontinence, unspecified type  -     Ambulatory referral/consult to Urology    Chronic constipation    Other orders  -     mirabegron (MYRBETRIQ) 50 mg Tb24; Take 1 tablet (50 mg total) by mouth Daily.  Dispense: 90 tablet; Refill: 1    Discussed bladder pathway, and 3rd line options of botox and interstim.   Continue constipation management. Will avoid anticholinergics due to constipation and rectocele.     Follow up in about 3 months (around 2/11/2023).

## 2022-12-02 ENCOUNTER — TELEPHONE (OUTPATIENT)
Dept: SURGERY | Facility: CLINIC | Age: 67
End: 2022-12-02
Payer: MEDICARE

## 2022-12-02 NOTE — TELEPHONE ENCOUNTER
Attempted to contact patient. Left voicemail with appointment date, address, time and callback number as [phone].

## 2022-12-05 ENCOUNTER — OFFICE VISIT (OUTPATIENT)
Dept: SURGERY | Facility: CLINIC | Age: 67
End: 2022-12-05
Payer: MEDICARE

## 2022-12-05 VITALS
TEMPERATURE: 98 F | DIASTOLIC BLOOD PRESSURE: 91 MMHG | WEIGHT: 141.75 LBS | BODY MASS INDEX: 24.33 KG/M2 | HEART RATE: 67 BPM | SYSTOLIC BLOOD PRESSURE: 144 MMHG

## 2022-12-05 DIAGNOSIS — Z86.010 HISTORY OF COLON POLYPS: ICD-10-CM

## 2022-12-05 DIAGNOSIS — Z87.19 HISTORY OF DIVERTICULITIS: Primary | ICD-10-CM

## 2022-12-05 PROCEDURE — 99204 PR OFFICE/OUTPT VISIT, NEW, LEVL IV, 45-59 MIN: ICD-10-PCS | Mod: S$PBB,,, | Performed by: COLON & RECTAL SURGERY

## 2022-12-05 PROCEDURE — 99999 PR PBB SHADOW E&M-EST. PATIENT-LVL III: ICD-10-PCS | Mod: PBBFAC,,, | Performed by: COLON & RECTAL SURGERY

## 2022-12-05 PROCEDURE — 99204 OFFICE O/P NEW MOD 45 MIN: CPT | Mod: S$PBB,,, | Performed by: COLON & RECTAL SURGERY

## 2022-12-05 PROCEDURE — 99999 PR PBB SHADOW E&M-EST. PATIENT-LVL III: CPT | Mod: PBBFAC,,, | Performed by: COLON & RECTAL SURGERY

## 2022-12-05 PROCEDURE — 99213 OFFICE O/P EST LOW 20 MIN: CPT | Mod: PBBFAC | Performed by: COLON & RECTAL SURGERY

## 2022-12-05 NOTE — PROGRESS NOTES
History & Physical    SUBJECTIVE:     History of Present Illness:  Patient is a 67 y.o. female presents to establish care regarding her history of diverticulitis. When she has an episode, she has crampy lower abdominal pain, worse in the LLQ. She last experienced these symptoms a few months ago but did not undergo any treatment, and the episode resolved spontaneously. She has never had CT evidence of diverticulitis and never had to be hospitalized for this in the past. Her last colonoscopy was in 2019 and had a tubular adenoma removed. She denies family history of CRC. She denies any hematochezia, melena, fevers, chills, nausea, and vomiting.  Only significant past surgical history is a hysterectomy and appendectomy through a Pfannenstiel incision.  She denies a family history of colorectal cancer.      Review of patient's allergies indicates:   Allergen Reactions    Levaquin [levofloxacin] Nausea And Vomiting       Current Outpatient Medications   Medication Sig Dispense Refill    calcium carbonate (OS-JUANITO) 500 mg calcium (1,250 mg) tablet Take 2 tablets (1,000 mg total) by mouth once daily. 180 tablet 4    CYANOCOBALAMIN/COBAMAMIDE (B12 SL) Place 2,500 mcg under the tongue once daily.      estradiol 0.1 mg/24 hr td ptwk 0.1 mg/24 hr PTWK APPLY 1 PATCH TRANSDERMALLYONTO THE SKIN 2 TIMES A    WEEK 24 patch 2    LEVOCARNITINE TARTRATE (CARNITINE, TARTRATE, ORAL) Take by mouth once daily.       levothyroxine (SYNTHROID) 88 MCG tablet Take 1 tablet (88 mcg total) by mouth before breakfast. 90 tablet 3    liothyronine (CYTOMEL) 5 MCG Tab Take 1 tablet (5 mcg total) by mouth 2 (two) times a day. 180 tablet 3    mirabegron (MYRBETRIQ) 50 mg Tb24 Take 1 tablet (50 mg total) by mouth Daily. 90 tablet 1    multivitamin (THERAGRAN) per tablet Take 1 tablet by mouth 2 (two) times daily.      polyethylene glycol (GLYCOLAX) 17 gram/dose powder Take 17 g by mouth once daily.      TURMERIC (CURCUMIN MISC) Take by mouth once daily.        No current facility-administered medications for this visit.       Past Medical History:   Diagnosis Date    Constipation, chronic     Diverticulosis     Glucose intolerance (impaired glucose tolerance) 5/3/2013    Hyperlipidemia 5/3/2013    Osteopenia     PSVT (paroxysmal supraventricular tachycardia)     Stress incontinence     Thyroid disease      Past Surgical History:   Procedure Laterality Date    APPENDECTOMY      BLADDER SUSPENSION      BREAST BIOPSY Right     fibroadenoma    BREAST SURGERY      R breast biopsy- benign (removed fibroid adenoma)    COLONOSCOPY W/ BIOPSIES      polyps were negative    HYSTERECTOMY      OOPHORECTOMY      TONSILLECTOMY      VAGINAL HYSTERECTOMY W/ ANTERIOR AND POSTERIOR VAGINAL REPAIR       Family History   Problem Relation Age of Onset    Depression Mother     Diabetes Mother     Diabetes Father     Hypertension Father     Melanoma Neg Hx      Social History     Tobacco Use    Smoking status: Never    Smokeless tobacco: Never   Substance Use Topics    Alcohol use: No    Drug use: No        Review of Systems:  Review of Systems   Constitutional:  Negative for activity change, appetite change, chills, fatigue, fever and unexpected weight change.   HENT:  Negative for congestion, ear pain, sore throat and trouble swallowing.    Eyes:  Negative for pain, redness and itching.   Respiratory:  Negative for cough, shortness of breath, wheezing and stridor.    Cardiovascular:  Negative for chest pain, palpitations and leg swelling.   Gastrointestinal:  Negative for abdominal distention, abdominal pain, anal bleeding, blood in stool, constipation, diarrhea, nausea, rectal pain and vomiting.   Endocrine: Negative for cold intolerance, heat intolerance and polyuria.   Genitourinary:  Negative for difficulty urinating, dysuria, flank pain, frequency, hematuria and urgency.   Musculoskeletal:  Negative for gait problem, joint swelling and neck pain.   Skin:  Negative for color change,  pallor, rash and wound.   Allergic/Immunologic: Negative for environmental allergies and immunocompromised state.   Neurological:  Negative for dizziness, speech difficulty, weakness and numbness.   Hematological:  Does not bruise/bleed easily.   Psychiatric/Behavioral:  Negative for agitation, confusion and hallucinations.      OBJECTIVE:     Vital Signs (Most Recent)  Temp: 98 °F (36.7 °C) (12/05/22 0808)  Pulse: 67 (12/05/22 0808)  BP: (!) 144/91 (12/05/22 0808)     64.3 kg (141 lb 12.1 oz)     Physical Exam:  Physical Exam  Vitals reviewed.   Constitutional:       General: She is not in acute distress.     Appearance: She is well-developed. She is not ill-appearing.   HENT:      Head: Normocephalic and atraumatic.      Right Ear: External ear normal.      Left Ear: External ear normal.   Eyes:      Extraocular Movements: Extraocular movements intact.      Conjunctiva/sclera: Conjunctivae normal.   Cardiovascular:      Rate and Rhythm: Normal rate.   Pulmonary:      Effort: Pulmonary effort is normal. No respiratory distress.   Abdominal:      General: There is no distension.      Palpations: Abdomen is soft.      Tenderness: There is no abdominal tenderness.      Comments: Abdomen soft, non-tender, and non-distended. Well-healed Pfannenstiel incision   Musculoskeletal:      Cervical back: Neck supple.   Skin:     General: Skin is warm and dry.   Neurological:      Mental Status: She is alert and oriented to person, place, and time.   Psychiatric:         Behavior: Behavior normal.       Diagnostic Results:  Previous colonoscopy and CT scan from 02/2021 reviewed.    ASSESSMENT/PLAN:     68 y/o female with reported history of diverticulitis and previous colonic adenoma    - Discussed pathophysiology of diverticular disease.  Discussed the differences between complicated and uncomplicated diverticulitis.  Discussed that she has always had uncomplicated diverticulitis if at all.  Discussed that it is likely she will  continue with these episodes this was the was causing the pain previously and there is a low risk of converting to complicated diverticulitis.  We also discussed with the surgery would entail if she were to ever require elective or emergent surgery for diverticular disease.  - encouraged a high-fiber diet and a fiber powder supplementation  - is pending a surveillance colonoscopy later this month.  - RTC PRN    Tad Adames MD  Colon and Rectal Surgery  [facility]

## 2022-12-05 NOTE — H&P (VIEW-ONLY)
History & Physical    SUBJECTIVE:     History of Present Illness:  Patient is a 67 y.o. female presents to establish care regarding her history of diverticulitis. When she has an episode, she has crampy lower abdominal pain, worse in the LLQ. She last experienced these symptoms a few months ago but did not undergo any treatment, and the episode resolved spontaneously. She has never had CT evidence of diverticulitis and never had to be hospitalized for this in the past. Her last colonoscopy was in 2019 and had a tubular adenoma removed. She denies family history of CRC. She denies any hematochezia, melena, fevers, chills, nausea, and vomiting.  Only significant past surgical history is a hysterectomy and appendectomy through a Pfannenstiel incision.  She denies a family history of colorectal cancer.      Review of patient's allergies indicates:   Allergen Reactions    Levaquin [levofloxacin] Nausea And Vomiting       Current Outpatient Medications   Medication Sig Dispense Refill    calcium carbonate (OS-JUANITO) 500 mg calcium (1,250 mg) tablet Take 2 tablets (1,000 mg total) by mouth once daily. 180 tablet 4    CYANOCOBALAMIN/COBAMAMIDE (B12 SL) Place 2,500 mcg under the tongue once daily.      estradiol 0.1 mg/24 hr td ptwk 0.1 mg/24 hr PTWK APPLY 1 PATCH TRANSDERMALLYONTO THE SKIN 2 TIMES A    WEEK 24 patch 2    LEVOCARNITINE TARTRATE (CARNITINE, TARTRATE, ORAL) Take by mouth once daily.       levothyroxine (SYNTHROID) 88 MCG tablet Take 1 tablet (88 mcg total) by mouth before breakfast. 90 tablet 3    liothyronine (CYTOMEL) 5 MCG Tab Take 1 tablet (5 mcg total) by mouth 2 (two) times a day. 180 tablet 3    mirabegron (MYRBETRIQ) 50 mg Tb24 Take 1 tablet (50 mg total) by mouth Daily. 90 tablet 1    multivitamin (THERAGRAN) per tablet Take 1 tablet by mouth 2 (two) times daily.      polyethylene glycol (GLYCOLAX) 17 gram/dose powder Take 17 g by mouth once daily.      TURMERIC (CURCUMIN MISC) Take by mouth once daily.        No current facility-administered medications for this visit.       Past Medical History:   Diagnosis Date    Constipation, chronic     Diverticulosis     Glucose intolerance (impaired glucose tolerance) 5/3/2013    Hyperlipidemia 5/3/2013    Osteopenia     PSVT (paroxysmal supraventricular tachycardia)     Stress incontinence     Thyroid disease      Past Surgical History:   Procedure Laterality Date    APPENDECTOMY      BLADDER SUSPENSION      BREAST BIOPSY Right     fibroadenoma    BREAST SURGERY      R breast biopsy- benign (removed fibroid adenoma)    COLONOSCOPY W/ BIOPSIES      polyps were negative    HYSTERECTOMY      OOPHORECTOMY      TONSILLECTOMY      VAGINAL HYSTERECTOMY W/ ANTERIOR AND POSTERIOR VAGINAL REPAIR       Family History   Problem Relation Age of Onset    Depression Mother     Diabetes Mother     Diabetes Father     Hypertension Father     Melanoma Neg Hx      Social History     Tobacco Use    Smoking status: Never    Smokeless tobacco: Never   Substance Use Topics    Alcohol use: No    Drug use: No        Review of Systems:  Review of Systems   Constitutional:  Negative for activity change, appetite change, chills, fatigue, fever and unexpected weight change.   HENT:  Negative for congestion, ear pain, sore throat and trouble swallowing.    Eyes:  Negative for pain, redness and itching.   Respiratory:  Negative for cough, shortness of breath, wheezing and stridor.    Cardiovascular:  Negative for chest pain, palpitations and leg swelling.   Gastrointestinal:  Negative for abdominal distention, abdominal pain, anal bleeding, blood in stool, constipation, diarrhea, nausea, rectal pain and vomiting.   Endocrine: Negative for cold intolerance, heat intolerance and polyuria.   Genitourinary:  Negative for difficulty urinating, dysuria, flank pain, frequency, hematuria and urgency.   Musculoskeletal:  Negative for gait problem, joint swelling and neck pain.   Skin:  Negative for color change,  pallor, rash and wound.   Allergic/Immunologic: Negative for environmental allergies and immunocompromised state.   Neurological:  Negative for dizziness, speech difficulty, weakness and numbness.   Hematological:  Does not bruise/bleed easily.   Psychiatric/Behavioral:  Negative for agitation, confusion and hallucinations.      OBJECTIVE:     Vital Signs (Most Recent)  Temp: 98 °F (36.7 °C) (12/05/22 0808)  Pulse: 67 (12/05/22 0808)  BP: (!) 144/91 (12/05/22 0808)     64.3 kg (141 lb 12.1 oz)     Physical Exam:  Physical Exam  Vitals reviewed.   Constitutional:       General: She is not in acute distress.     Appearance: She is well-developed. She is not ill-appearing.   HENT:      Head: Normocephalic and atraumatic.      Right Ear: External ear normal.      Left Ear: External ear normal.   Eyes:      Extraocular Movements: Extraocular movements intact.      Conjunctiva/sclera: Conjunctivae normal.   Cardiovascular:      Rate and Rhythm: Normal rate.   Pulmonary:      Effort: Pulmonary effort is normal. No respiratory distress.   Abdominal:      General: There is no distension.      Palpations: Abdomen is soft.      Tenderness: There is no abdominal tenderness.      Comments: Abdomen soft, non-tender, and non-distended. Well-healed Pfannenstiel incision   Musculoskeletal:      Cervical back: Neck supple.   Skin:     General: Skin is warm and dry.   Neurological:      Mental Status: She is alert and oriented to person, place, and time.   Psychiatric:         Behavior: Behavior normal.       Diagnostic Results:  Previous colonoscopy and CT scan from 02/2021 reviewed.    ASSESSMENT/PLAN:     66 y/o female with reported history of diverticulitis and previous colonic adenoma    - Discussed pathophysiology of diverticular disease.  Discussed the differences between complicated and uncomplicated diverticulitis.  Discussed that she has always had uncomplicated diverticulitis if at all.  Discussed that it is likely she will  continue with these episodes this was the was causing the pain previously and there is a low risk of converting to complicated diverticulitis.  We also discussed with the surgery would entail if she were to ever require elective or emergent surgery for diverticular disease.  - encouraged a high-fiber diet and a fiber powder supplementation  - is pending a surveillance colonoscopy later this month.  - RTC PRN    Tad Adames MD  Colon and Rectal Surgery  [facility]

## 2022-12-16 ENCOUNTER — HOSPITAL ENCOUNTER (OUTPATIENT)
Facility: HOSPITAL | Age: 67
Discharge: HOME | End: 2022-12-16
Attending: COLON & RECTAL SURGERY | Admitting: COLON & RECTAL SURGERY
Payer: MEDICARE

## 2022-12-16 ENCOUNTER — ANESTHESIA EVENT (OUTPATIENT)
Dept: ENDOSCOPY | Facility: HOSPITAL | Age: 67
End: 2022-12-16
Payer: MEDICARE

## 2022-12-16 ENCOUNTER — ANESTHESIA (OUTPATIENT)
Dept: ENDOSCOPY | Facility: HOSPITAL | Age: 67
End: 2022-12-16
Payer: MEDICARE

## 2022-12-16 DIAGNOSIS — Z12.11 SCREENING FOR COLON CANCER: ICD-10-CM

## 2022-12-16 PROCEDURE — 45380 COLONOSCOPY AND BIOPSY: CPT | Mod: 59,PT,, | Performed by: COLON & RECTAL SURGERY

## 2022-12-16 PROCEDURE — 45380 PR COLONOSCOPY,BIOPSY: ICD-10-PCS | Mod: 59,PT,, | Performed by: COLON & RECTAL SURGERY

## 2022-12-16 PROCEDURE — 27201012 HC FORCEPS, HOT/COLD, DISP: Performed by: COLON & RECTAL SURGERY

## 2022-12-16 PROCEDURE — 27201089 HC SNARE, DISP (ANY): Performed by: COLON & RECTAL SURGERY

## 2022-12-16 PROCEDURE — 37000008 HC ANESTHESIA 1ST 15 MINUTES: Performed by: COLON & RECTAL SURGERY

## 2022-12-16 PROCEDURE — 88305 TISSUE EXAM BY PATHOLOGIST: ICD-10-PCS | Mod: 26,,, | Performed by: PATHOLOGY

## 2022-12-16 PROCEDURE — 88305 TISSUE EXAM BY PATHOLOGIST: CPT | Performed by: PATHOLOGY

## 2022-12-16 PROCEDURE — 63600175 PHARM REV CODE 636 W HCPCS: Performed by: NURSE ANESTHETIST, CERTIFIED REGISTERED

## 2022-12-16 PROCEDURE — 45385 COLONOSCOPY W/LESION REMOVAL: CPT | Mod: PT,,, | Performed by: COLON & RECTAL SURGERY

## 2022-12-16 PROCEDURE — 45385 PR COLONOSCOPY,REMV LESN,SNARE: ICD-10-PCS | Mod: PT,,, | Performed by: COLON & RECTAL SURGERY

## 2022-12-16 PROCEDURE — 25000003 PHARM REV CODE 250: Performed by: NURSE ANESTHETIST, CERTIFIED REGISTERED

## 2022-12-16 PROCEDURE — 45385 COLONOSCOPY W/LESION REMOVAL: CPT | Mod: PT | Performed by: COLON & RECTAL SURGERY

## 2022-12-16 PROCEDURE — 88305 TISSUE EXAM BY PATHOLOGIST: CPT | Mod: 26,,, | Performed by: PATHOLOGY

## 2022-12-16 PROCEDURE — 45380 COLONOSCOPY AND BIOPSY: CPT | Mod: 59,PT | Performed by: COLON & RECTAL SURGERY

## 2022-12-16 PROCEDURE — 37000009 HC ANESTHESIA EA ADD 15 MINS: Performed by: COLON & RECTAL SURGERY

## 2022-12-16 RX ORDER — LIDOCAINE HYDROCHLORIDE 10 MG/ML
INJECTION, SOLUTION EPIDURAL; INFILTRATION; INTRACAUDAL; PERINEURAL
Status: DISCONTINUED | OUTPATIENT
Start: 2022-12-16 | End: 2022-12-16

## 2022-12-16 RX ORDER — SODIUM CHLORIDE, SODIUM LACTATE, POTASSIUM CHLORIDE, CALCIUM CHLORIDE 600; 310; 30; 20 MG/100ML; MG/100ML; MG/100ML; MG/100ML
INJECTION, SOLUTION INTRAVENOUS CONTINUOUS PRN
Status: DISCONTINUED | OUTPATIENT
Start: 2022-12-16 | End: 2022-12-16

## 2022-12-16 RX ORDER — SODIUM CHLORIDE, SODIUM LACTATE, POTASSIUM CHLORIDE, CALCIUM CHLORIDE 600; 310; 30; 20 MG/100ML; MG/100ML; MG/100ML; MG/100ML
INJECTION, SOLUTION INTRAVENOUS CONTINUOUS
Status: DISCONTINUED | OUTPATIENT
Start: 2022-12-16 | End: 2022-12-16 | Stop reason: HOSPADM

## 2022-12-16 RX ORDER — PROPOFOL 10 MG/ML
VIAL (ML) INTRAVENOUS
Status: DISCONTINUED | OUTPATIENT
Start: 2022-12-16 | End: 2022-12-16

## 2022-12-16 RX ADMIN — SODIUM CHLORIDE, SODIUM LACTATE, POTASSIUM CHLORIDE, AND CALCIUM CHLORIDE: .6; .31; .03; .02 INJECTION, SOLUTION INTRAVENOUS at 07:12

## 2022-12-16 RX ADMIN — PROPOFOL 350 MG: 10 INJECTION, EMULSION INTRAVENOUS at 08:12

## 2022-12-16 RX ADMIN — LIDOCAINE HYDROCHLORIDE 50 MG: 10 INJECTION, SOLUTION EPIDURAL; INFILTRATION; INTRACAUDAL; PERINEURAL at 08:12

## 2022-12-16 NOTE — INTERVAL H&P NOTE
The patient has been examined and the H&P has been reviewed:    I concur with the findings and no changes have occurred since H&P was written.    - Ok to proceed to endoscopy suite for colonoscopy  - Consent obtained. All risks, benefits and alternatives fully explained to patient, including but not limited to bleeding, infection, perforation, and missed polyps. All questions appropriately answered to patient's satisfaction. Consent signed and placed on chart.    Procedure risks, benefits and alternative options discussed and understood by patient/family.

## 2022-12-16 NOTE — ANESTHESIA POSTPROCEDURE EVALUATION
Anesthesia Post Evaluation    Patient: Mone Best    Procedure(s) Performed: Procedure(s) (LRB):  COLONOSCOPY (N/A)    Final Anesthesia Type: MAC      Patient location during evaluation: GI PACU  Patient participation: No - Unable to Participate, Sedation  Level of consciousness: responds to stimulation  Post-procedure vital signs: reviewed and stable  Pain management: adequate  Airway patency: patent    PONV status at discharge: No PONV  Anesthetic complications: no      Cardiovascular status: blood pressure returned to baseline  Respiratory status: unassisted, spontaneous ventilation and room air  Hydration status: euvolemic  Follow-up not needed.              No case tracking events are documented in the log.      Pain/Nikolas Score: No data recorded

## 2022-12-16 NOTE — BRIEF OP NOTE
[facility] - Endoscopy (Hospital)  Brief Operative Note     SUMMARY     Surgery Date: 12/16/2022     Surgeon(s) and Role:     * Kandy Adames MD - Primary    Assisting Surgeon: None    Pre-op Diagnosis:  History of colonic polyps [Z86.010]  Screening for colorectal cancer [Z12.11, Z12.12]    Post-op Diagnosis:  Post-Op Diagnosis Codes:     * History of colonic polyps [Z86.010]     * Screening for colorectal cancer [Z12.11, Z12.12]    Procedure(s) (LRB):  COLONOSCOPY (N/A)    Anesthesia: Choice    Description of the findings of the procedure: multiple polyps; diverticulosis    Estimated Blood Loss: * No values recorded between 12/16/2022  7:53 AM and 12/16/2022  8:17 AM *         Specimens:   Specimen (24h ago, onward)       Start     Ordered    12/16/22 0805  Specimen to Pathology, Surgery Gastrointestinal tract  Once        Comments: Pre-op Diagnosis: History of colonic polyps [Z86.010]Screening for colorectal cancer [Z12.11, Z12.12]Procedure(s):COLONOSCOPY 1. Transverse polypectomy x 2 2. Ascending polypectomy3. Splenic Flexure polypectomy 4. Descending polypectomy     References:    Click here for ordering Quick Tip   Question Answer Comment   Procedure Type: Gastrointestinal tract    Which provider would you like to cc? KANDY ADAMES    Release to patient Immediate        12/16/22 0818                    Discharge Note    SUMMARY     Admit Date: 12/16/2022    Discharge Date and Time: 12/16/2022 8:29 AM    Hospital Course Patient was seen in the preoperative area by both myself and anesthesia. All consents were verified and all questions appropriately answered. All risks, benefits and alternatives explained to patient. Patient proceeded to endoscopy suite for colonoscopy and was discharged home postoperative once cleared by anesthesia.    Final Diagnosis: Post-Op Diagnosis Codes:     * History of colonic polyps [Z86.010]     * Screening for colorectal cancer [Z12.11, Z12.12]    Disposition: Home or Self  Care    Follow Up/Patient Instructions: See Provation report    Medications:  Reconciled Home Medications:      Medication List        CONTINUE taking these medications      B12 SL  Place 2,500 mcg under the tongue once daily.     calcium carbonate 500 mg calcium (1,250 mg) tablet  Commonly known as: OS-JUANITO  Take 2 tablets (1,000 mg total) by mouth once daily.     CARNITINE (TARTRATE) ORAL  Take by mouth once daily.     CURCUMIN MISC  Take by mouth once daily.     estradiol 0.1 mg/24 hr td ptwk 0.1 mg/24 hr Ptwk  APPLY 1 PATCH TRANSDERMALLYONTO THE SKIN 2 TIMES A    WEEK     levothyroxine 88 MCG tablet  Commonly known as: SYNTHROID  Take 1 tablet (88 mcg total) by mouth before breakfast.     liothyronine 5 MCG Tab  Commonly known as: CYTOMEL  Take 1 tablet (5 mcg total) by mouth 2 (two) times a day.     mirabegron 50 mg Tb24  Commonly known as: MYRBETRIQ  Take 1 tablet (50 mg total) by mouth Daily.     multivitamin per tablet  Commonly known as: THERAGRAN  Take 1 tablet by mouth 2 (two) times daily.     polyethylene glycol 17 gram/dose powder  Commonly known as: GLYCOLAX  Take 17 g by mouth once daily.            Discharge Procedure Orders   Diet general     Call MD for:  temperature >100.4     Call MD for:  persistent nausea and vomiting     Call MD for:  severe uncontrolled pain     Call MD for:  difficulty breathing, headache or visual disturbances     Call MD for:  redness, tenderness, or signs of infection (pain, swelling, redness, odor or green/yellow discharge around incision site)     Call MD for:  hives     Call MD for:  persistent dizziness or light-headedness     Call MD for:  extreme fatigue     Activity as tolerated      Follow-up Information       L Cody Plummer MD Follow up.    Specialty: Family Medicine  Why: As needed  Contact information:  [address]  [place] [address] [phone]

## 2022-12-16 NOTE — TRANSFER OF CARE
"Anesthesia Transfer of Care Note    Patient: Mone Best    Procedure(s) Performed: Procedure(s) (LRB):  COLONOSCOPY (N/A)    Patient location: GI    Anesthesia Type: MAC    Transport from OR: Transported from OR on room air with adequate spontaneous ventilation    Post pain: adequate analgesia    Post assessment: no apparent anesthetic complications    Post vital signs: stable    Level of consciousness: responds to stimulation    Nausea/Vomiting: no nausea/vomiting    Complications: none    Transfer of care protocol was followed      Last vitals:   Visit Vitals  /74 (BP Location: Left arm, Patient Position: Lying)   Pulse 73   Temp 37.2 °C (99 °F) (Temporal)   Resp 16   Ht 5' 4" (1.626 m)   Wt 64.4 kg (142 lb)   SpO2 98%   Breastfeeding No   BMI 24.37 kg/m²     "

## 2022-12-16 NOTE — PLAN OF CARE
Dr miranda came to bedside to discuss findings. Vital signs stable, no patient c/o nausea/vomitting, no abdominal pain, no gi bleeding. Patient to be discharged from unit.

## 2022-12-16 NOTE — ANESTHESIA PREPROCEDURE EVALUATION
12/16/2022  Mone Best is a 67 y.o., female.      Pre-op Assessment    I have reviewed the Patient Summary Reports.     I have reviewed the Nursing Notes. I have reviewed the NPO Status.   I have reviewed the Medications.     Review of Systems  Anesthesia Hx:  No problems with previous Anesthesia    Hematology/Oncology:  Hematology Normal   Oncology Normal     EENT/Dental:EENT/Dental Normal   Cardiovascular:   hyperlipidemia H/o PSVT   Pulmonary:  Pulmonary Normal    Hepatic/GI:   Bowel Prep.    Musculoskeletal:  Musculoskeletal Normal    Neurological:  Neurology Normal    Endocrine:   Hypothyroidism    Dermatological:  Skin Normal    Psych:  Psychiatric Normal           Physical Exam  General: Well nourished, Cooperative, Alert and Oriented    Airway:  Mallampati: I / I  Mouth Opening: Normal  TM Distance: Normal  Tongue: Normal    Dental:  Intact    Chest/Lungs:  Normal Respiratory Rate    Heart:  Rate: Normal  Sounds: Normal        Anesthesia Plan  Type of Anesthesia, risks & benefits discussed:    Anesthesia Type: MAC  Intra-op Monitoring Plan: Standard ASA Monitors  Post Op Pain Control Plan: multimodal analgesia  Induction:  IV  Informed Consent: Informed consent signed with the Patient and all parties understand the risks and agree with anesthesia plan.  All questions answered.   ASA Score: 2  Day of Surgery Review of History & Physical: H&P Update referred to the surgeon/provider.    Ready For Surgery From Anesthesia Perspective.     .

## 2022-12-16 NOTE — PROVATION PATIENT INSTRUCTIONS
Discharge Summary/Instructions after an Endoscopic Procedure  Patient Name: Mone Best  Patient MRN: [id number]  Patient YOB: 1955 Friday, December 16, 2022 Tad Adames MD  Dear patient,  As a result of recent federal legislation (The Federal Cures Act), you may   receive lab or pathology results from your procedure in your [facility]   account before your physician is able to contact you. Your physician or   their representative will relay the results to you with their   recommendations at their soonest availability.  Thank you,  RESTRICTIONS:  During your procedure today, you received medications for sedation.  These   medications may affect your judgment, balance and coordination.  Therefore,   for 24 hours, you have the following restrictions:   - DO NOT drive a car, operate machinery, make legal/financial decisions,   sign important papers or drink alcohol.    ACTIVITY:  Today: no heavy lifting, straining or running due to procedural   sedation/anesthesia.  The following day: return to full activity including work.  DIET:  Eat and drink normally unless instructed otherwise.     TREATMENT FOR COMMON SIDE EFFECTS:  - Mild abdominal pain, nausea, belching, bloating or excessive gas:  rest,   eat lightly and use a heating pad.  - Sore Throat: treat with throat lozenges and/or gargle with warm salt   water.  - Because air was used during the procedure, expelling large amounts of air   from your rectum or belching is normal.  - If a bowel prep was taken, you may not have a bowel movement for 1-3 days.    This is normal.  SYMPTOMS TO WATCH FOR AND REPORT TO YOUR PHYSICIAN:  1. Abdominal pain or bloating, other than gas cramps.  2. Chest pain.  3. Back pain.  4. Signs of infection such as: chills or fever occurring within 24 hours   after the procedure.  5. Rectal bleeding, which would show as bright red, maroon, or black stools.   (A tablespoon of blood from the rectum is not serious, especially  if   hemorrhoids are present.)  6. Vomiting.  7. Weakness or dizziness.  GO DIRECTLY TO THE NEAREST EMERGENCY ROOM IF YOU HAVE ANY OF THE FOLLOWING:      Difficulty breathing              Chills and/or fever over 101 F   Persistent vomiting and/or vomiting blood   Severe abdominal pain   Severe chest pain   Black, tarry stools   Bleeding- more than one tablespoon   Any other symptom or condition that you feel may need urgent attention  Your doctor recommends these additional instructions:  If any biopsies were taken, your doctors clinic will contact you in 1 to 2   weeks with any results.  - Discharge patient to home.   - High fiber diet.   - Continue present medications.   - Await pathology results.   - Repeat colonoscopy in 3 years for surveillance.   - Return to primary care physician PRN.  For questions, problems or results please call your physician Tad Adames MD at Work:  [phone]  If you have any questions about the above instructions, call the GI   department at [phone] or call the endoscopy unit at [phone]   from 7am until 3 pm.  [facility] [place], EMERGENCY ROOM PHONE NUMBER:   [phone]  IF A COMPLICATION OR EMERGENCY SITUATION ARISES AND YOU ARE UNABLE TO REACH   YOUR PHYSICIAN - GO DIRECTLY TO THE EMERGENCY ROOM.  I have read or have had read to me these discharge instructions for my   procedure and have received a written copy.  I understand these   instructions and will follow-up with my physician if I have any questions.     __________________________________       _____________________________________  Nurse Signature                                          Patient/Designated   Responsible Party Signature  MD Tad Pfeiffer MD  12/16/2022 8:28:44 AM  This report has been verified and signed electronically.  Dear patient,  As a result of recent federal legislation (The Federal Cures Act), you may   receive lab or pathology  results from your procedure in your [facility]   account before your physician is able to contact you. Your physician or   their representative will relay the results to you with their   recommendations at their soonest availability.  Thank you,  PROVATION

## 2022-12-19 VITALS
TEMPERATURE: 99 F | SYSTOLIC BLOOD PRESSURE: 100 MMHG | HEART RATE: 75 BPM | RESPIRATION RATE: 15 BRPM | OXYGEN SATURATION: 100 % | BODY MASS INDEX: 24.24 KG/M2 | WEIGHT: 142 LBS | DIASTOLIC BLOOD PRESSURE: 70 MMHG | HEIGHT: 64 IN

## 2022-12-23 LAB
FINAL PATHOLOGIC DIAGNOSIS: NORMAL
Lab: NORMAL

## 2022-12-31 ENCOUNTER — OFFICE VISIT (OUTPATIENT)
Dept: URGENT CARE | Facility: CLINIC | Age: 67
End: 2022-12-31
Payer: MEDICARE

## 2022-12-31 VITALS
DIASTOLIC BLOOD PRESSURE: 77 MMHG | WEIGHT: 142 LBS | HEART RATE: 90 BPM | TEMPERATURE: 98 F | SYSTOLIC BLOOD PRESSURE: 130 MMHG | HEIGHT: 64 IN | OXYGEN SATURATION: 99 % | RESPIRATION RATE: 18 BRPM | BODY MASS INDEX: 24.24 KG/M2

## 2022-12-31 DIAGNOSIS — R09.81 CONGESTION OF NASAL SINUS: ICD-10-CM

## 2022-12-31 DIAGNOSIS — J06.9 UPPER RESPIRATORY TRACT INFECTION, UNSPECIFIED TYPE: Primary | ICD-10-CM

## 2022-12-31 LAB
CTP QC/QA: YES
CTP QC/QA: YES
POC MOLECULAR INFLUENZA A AGN: NEGATIVE
POC MOLECULAR INFLUENZA B AGN: NEGATIVE
SARS-COV-2 AG RESP QL IA.RAPID: NEGATIVE

## 2022-12-31 PROCEDURE — 87811 SARS-COV-2 COVID19 W/OPTIC: CPT | Mod: QW,CR,S$GLB, | Performed by: NURSE PRACTITIONER

## 2022-12-31 PROCEDURE — 99213 OFFICE O/P EST LOW 20 MIN: CPT | Mod: S$GLB,,, | Performed by: NURSE PRACTITIONER

## 2022-12-31 PROCEDURE — 99213 PR OFFICE/OUTPT VISIT, EST, LEVL III, 20-29 MIN: ICD-10-PCS | Mod: S$GLB,,, | Performed by: NURSE PRACTITIONER

## 2022-12-31 PROCEDURE — 87811 SARS CORONAVIRUS 2 ANTIGEN POCT, MANUAL READ: ICD-10-PCS | Mod: QW,CR,S$GLB, | Performed by: NURSE PRACTITIONER

## 2022-12-31 PROCEDURE — 87502 POCT INFLUENZA A/B MOLECULAR: ICD-10-PCS | Mod: QW,S$GLB,, | Performed by: NURSE PRACTITIONER

## 2022-12-31 PROCEDURE — 87502 INFLUENZA DNA AMP PROBE: CPT | Mod: QW,S$GLB,, | Performed by: NURSE PRACTITIONER

## 2022-12-31 NOTE — PROGRESS NOTES
"Subjective:       Patient ID: Mone Best is a 67 y.o. female.    Vitals:  height is 5' 4" (1.626 m) and weight is 64.4 kg (142 lb). Her oral temperature is 98.1 °F (36.7 °C). Her blood pressure is 130/77 and her pulse is 90. Her respiration is 18 and oxygen saturation is 99%.     Chief Complaint: Nasal Congestion (Started monday)    Patient presents with to urgent care for evaluation of  congestion and sinus pressure. Associatied symptoms include  post nasal drip, productive cough, and body aches. Symptoms started Monday and pt is not improving. She feels worse at night. Last dose of ibuprofen at 4am this morning. She took home covid test on Wednesday and was negative.     Sinus Problem  This is a new problem. The current episode started in the past 7 days (12/26). The problem has been gradually worsening since onset. There has been no fever. Her pain is at a severity of 0/10. Associated symptoms include chills, congestion, coughing, headaches and sinus pressure. Past treatments include lying down and spray decongestants. The treatment provided mild relief.     Constitution: Positive for chills.   HENT:  Positive for congestion and sinus pressure.    Neck: neck negative.   Cardiovascular: Negative.    Respiratory:  Positive for cough.    Gastrointestinal: Negative.    Skin: Negative.    Neurological:  Positive for headaches.     Objective:      Physical Exam   Constitutional: She is oriented to person, place, and time. She appears well-developed. She is cooperative.  Non-toxic appearance. She does not appear ill. No distress.   HENT:   Head: Normocephalic and atraumatic.   Ears:   Right Ear: Hearing, tympanic membrane, external ear and ear canal normal.   Left Ear: Hearing, tympanic membrane, external ear and ear canal normal.   Nose: Nose normal. No mucosal edema, rhinorrhea or nasal deformity. No epistaxis. Right sinus exhibits no maxillary sinus tenderness and no frontal sinus tenderness. Left sinus " exhibits no maxillary sinus tenderness and no frontal sinus tenderness.   Mouth/Throat: Uvula is midline and mucous membranes are normal. No trismus in the jaw. Normal dentition. No uvula swelling. Posterior oropharyngeal erythema (mild) present. No oropharyngeal exudate, posterior oropharyngeal edema, tonsillar abscesses or cobblestoning. Tonsils are 1+ on the right. Tonsils are 1+ on the left.   Eyes: Conjunctivae and lids are normal. No scleral icterus.   Neck: Trachea normal and phonation normal. Neck supple. No edema present. No erythema present. No neck rigidity present.   Cardiovascular: Normal rate, regular rhythm, normal heart sounds and normal pulses.   Pulmonary/Chest: Effort normal and breath sounds normal. No respiratory distress. She has no decreased breath sounds. She has no rhonchi.   Abdominal: Normal appearance.   Musculoskeletal: Normal range of motion.         General: No deformity. Normal range of motion.   Neurological: She is alert and oriented to person, place, and time. She exhibits normal muscle tone. Coordination normal.   Skin: Skin is warm, dry, intact, not diaphoretic and not pale.   Psychiatric: Her speech is normal and behavior is normal. Judgment and thought content normal.   Nursing note and vitals reviewed.      Assessment:       1. Upper respiratory tract infection, unspecified type    2. Congestion of nasal sinus          Plan:         Upper respiratory tract infection, unspecified type    Congestion of nasal sinus  -     SARS Coronavirus 2 Antigen, POCT Manual Read  -     POCT Influenza A/B MOLECULAR                 Results for orders placed or performed in visit on 12/31/22   SARS Coronavirus 2 Antigen, POCT Manual Read   Result Value Ref Range    SARS Coronavirus 2 Antigen Negative Negative     Acceptable Yes    POCT Influenza A/B MOLECULAR   Result Value Ref Range    POC Molecular Influenza A Ag Negative Negative, Not Reported    POC Molecular Influenza B Ag  Negative Negative, Not Reported     Acceptable Yes           PLEASE READ YOUR DISCHARGE INSTRUCTIONS ENTIRELY AS IT CONTAINS IMPORTANT INFORMATION.      Please drink plenty of fluids.    Please get plenty of rest.    Please return here or go to the Emergency Department for any concerns or worsening of condition.    Please take an over the counter antihistamine medication (allegra/Claritin/Zyrtec) of your choice as directed.    Try an over the counter decongestant like Mucinex D or Sudafed. You buy this behind the pharmacy counter    If you do have Hypertension or palpitations, it is safe to take Coricidin HBP for relief of sinus symptoms.    If not allergic, please take over the counter Tylenol (Acetaminophen) and/or Motrin (Ibuprofen) as directed for control of pain and/or fever.  Please follow up with your primary care doctor or specialist as needed.    Sore throat recommendations: Warm fluids, warm salt water gargles, throat lozenges, tea, honey, soup, rest, hydration.    Use over the counter flonase: one spray each nostril twice daily OR two sprays each nostril once daily.     Sinus rinses DO NOT USE TAP WATER, if you must, water must be a rolling boil for 1 minute, let it cool, then use.  May use distilled water, or over the counter nasal saline rinses.  Vics vapor rub in shower to help open nasal passages.  May use nasal gel to keep passages moisturized.  May use Nasal saline sprays during the day for added relief of congestion.   For those who go to the gym, please do not use the sauna or steam room now to clear sinuses.    If you  smoke, please stop smoking.      Please return or see your primary care doctor if you develop new or worsening symptoms.     Please arrange follow up with your primary medical clinic as soon as possible. You must understand that you've received an Urgent Care treatment only and that you may be released before all of your medical problems are known or treated. You,  the patient, will arrange for follow up as instructed. If your symptoms worsen or fail to improve you should go to the Emergency Room.

## 2023-01-02 ENCOUNTER — NURSE TRIAGE (OUTPATIENT)
Dept: ADMINISTRATIVE | Facility: CLINIC | Age: 68
End: 2023-01-02
Payer: MEDICARE

## 2023-01-02 RX ORDER — PROMETHAZINE HYDROCHLORIDE AND DEXTROMETHORPHAN HYDROBROMIDE 6.25; 15 MG/5ML; MG/5ML
5 SYRUP ORAL 2 TIMES DAILY PRN
Qty: 180 ML | Refills: 0 | Status: SHIPPED | OUTPATIENT
Start: 2023-01-02 | End: 2023-01-12

## 2023-01-02 NOTE — TELEPHONE ENCOUNTER
Reason for Disposition   Caller has already spoken with another triager and has no further questions.    Additional Information   Negative: Caller is angry or rude (e.g., hangs up, verbally abusive, yelling)   Negative: Caller hangs up    Protocols used: No Contact or Duplicate Contact Call-A-AH

## 2023-01-02 NOTE — TELEPHONE ENCOUNTER
Spoke with pt who reports that she was diagnosed with URI at urgent care. Satcintia she was told to take Mucinex D, and zyrtec. States that she was noted to have elevated HR of 126 yesterday. Pt states HR is 86 while on phone with triage nurse. Asking what can she take to help with mucous.    Spoke with Dr. Toure. States will call in prescription that will not elevate HR.    Pt verbalized understanding.      Reason for Disposition   [1] Heart beating very rapidly (e.g., > 140 / minute) AND [2] not present now  (Exception: during exercise)    Additional Information   Negative: Passed out (i.e., lost consciousness, collapsed and was not responding)   Negative: Shock suspected (e.g., cold/pale/clammy skin, too weak to stand, low BP, rapid pulse)   Negative: Difficult to awaken or acting confused (e.g., disoriented, slurred speech)   Negative: Visible sweat on face or sweat dripping down face   Negative: Unable to walk, or can only walk with assistance (e.g., requires support)   Negative: [1] Received SHOCK from implantable cardiac defibrillator AND [2] persisting symptoms (i.e., palpitations, lightheadedness)   Negative: [1] Dizziness, lightheadedness, or weakness AND [2] heart beating very rapidly (e.g., > 140 / minute)   Negative: [1] Dizziness, lightheadedness, or weakness AND [2] heart beating very slowly (e.g., < 50 / minute)   Negative: Sounds like a life-threatening emergency to the triager   Negative: Difficulty breathing   Negative: Dizziness, lightheadedness, or weakness   Negative: [1] Heart beating very rapidly (e.g., > 140 / minute) AND [2] present now  (Exception: during exercise)   Negative: Heart beating very slowly (e.g., < 50 / minute)  (Exception: athlete and heart rate normal for caller)   Negative: New or worsened shortness of breath with activity (dyspnea on exertion)   Negative: Patient sounds very sick or weak to the triager    Protocols used: Heart Rate and Heartbeat [id number]

## 2023-01-23 ENCOUNTER — PATIENT MESSAGE (OUTPATIENT)
Dept: INTERNAL MEDICINE | Facility: CLINIC | Age: 68
End: 2023-01-23
Payer: MEDICARE

## 2023-01-23 ENCOUNTER — PATIENT MESSAGE (OUTPATIENT)
Dept: DERMATOLOGY | Facility: CLINIC | Age: 68
End: 2023-01-23
Payer: MEDICARE

## 2023-01-24 ENCOUNTER — PATIENT MESSAGE (OUTPATIENT)
Dept: INTERNAL MEDICINE | Facility: CLINIC | Age: 68
End: 2023-01-24
Payer: MEDICARE

## 2023-01-25 ENCOUNTER — PATIENT MESSAGE (OUTPATIENT)
Dept: DERMATOLOGY | Facility: CLINIC | Age: 68
End: 2023-01-25
Payer: MEDICARE

## 2023-01-25 ENCOUNTER — PATIENT MESSAGE (OUTPATIENT)
Dept: INTERNAL MEDICINE | Facility: CLINIC | Age: 68
End: 2023-01-25
Payer: MEDICARE

## 2023-01-25 DIAGNOSIS — L72.0 EPIDERMAL INCLUSION CYST: Primary | ICD-10-CM

## 2023-01-26 ENCOUNTER — OFFICE VISIT (OUTPATIENT)
Dept: INTERNAL MEDICINE | Facility: CLINIC | Age: 68
End: 2023-01-26
Payer: MEDICARE

## 2023-01-26 ENCOUNTER — HOSPITAL ENCOUNTER (OUTPATIENT)
Dept: CARDIOLOGY | Facility: HOSPITAL | Age: 68
Discharge: HOME | End: 2023-01-26
Attending: FAMILY MEDICINE
Payer: MEDICARE

## 2023-01-26 VITALS
BODY MASS INDEX: 24.1 KG/M2 | OXYGEN SATURATION: 99 % | HEIGHT: 64 IN | HEART RATE: 64 BPM | DIASTOLIC BLOOD PRESSURE: 70 MMHG | SYSTOLIC BLOOD PRESSURE: 120 MMHG | WEIGHT: 141.13 LBS

## 2023-01-26 DIAGNOSIS — Z82.49 FAMILY HISTORY OF HEART DISEASE: ICD-10-CM

## 2023-01-26 DIAGNOSIS — R09.89 LABILE BLOOD PRESSURE: ICD-10-CM

## 2023-01-26 DIAGNOSIS — R00.2 PALPITATIONS: ICD-10-CM

## 2023-01-26 DIAGNOSIS — R06.09 DYSPNEA ON EXERTION: ICD-10-CM

## 2023-01-26 DIAGNOSIS — I47.10 PSVT (PAROXYSMAL SUPRAVENTRICULAR TACHYCARDIA): Chronic | ICD-10-CM

## 2023-01-26 DIAGNOSIS — E03.9 ACQUIRED HYPOTHYROIDISM: Chronic | ICD-10-CM

## 2023-01-26 DIAGNOSIS — R00.2 PALPITATIONS: Primary | ICD-10-CM

## 2023-01-26 PROCEDURE — 99214 OFFICE O/P EST MOD 30 MIN: CPT | Mod: S$GLB,,, | Performed by: PHYSICIAN ASSISTANT

## 2023-01-26 PROCEDURE — 99215 OFFICE O/P EST HI 40 MIN: CPT | Mod: PBBFAC | Performed by: PHYSICIAN ASSISTANT

## 2023-01-26 PROCEDURE — 99999 PR PBB SHADOW E&M-EST. PATIENT-LVL V: CPT | Mod: PBBFAC,,, | Performed by: PHYSICIAN ASSISTANT

## 2023-01-26 PROCEDURE — 93010 ELECTROCARDIOGRAM REPORT: CPT | Mod: ,,, | Performed by: INTERNAL MEDICINE

## 2023-01-26 PROCEDURE — 99999 PR PBB SHADOW E&M-EST. PATIENT-LVL V: ICD-10-PCS | Mod: PBBFAC,,, | Performed by: PHYSICIAN ASSISTANT

## 2023-01-26 PROCEDURE — 93010 EKG 12-LEAD: ICD-10-PCS | Mod: ,,, | Performed by: INTERNAL MEDICINE

## 2023-01-26 PROCEDURE — 93005 ELECTROCARDIOGRAM TRACING: CPT

## 2023-01-26 PROCEDURE — 99214 PR OFFICE/OUTPT VISIT, EST, LEVL IV, 30-39 MIN: ICD-10-PCS | Mod: S$GLB,,, | Performed by: PHYSICIAN ASSISTANT

## 2023-01-26 RX ORDER — PROPRANOLOL HYDROCHLORIDE 20 MG/1
20 TABLET ORAL 3 TIMES DAILY PRN
Qty: 30 TABLET | Refills: 0 | Status: SHIPPED | OUTPATIENT
Start: 2023-01-26 | End: 2023-12-01 | Stop reason: SDUPTHER

## 2023-01-26 NOTE — PROGRESS NOTES
Subjective:      Patient ID: Mone Best is a 67 y.o. female.    Chief Complaint: Hypertension    Strong fcamily history of heart disease. 1 cup of coffee in the Am. Decaff tea in the evening. Rarely drinks.     Palpitations   This is a new problem. Episode onset: 6 weeks. The symptoms are aggravated by thyroid drugs. Associated symptoms include an irregular heartbeat, malaise/fatigue and shortness of breath. Pertinent negatives include no anxiety, chest fullness, chest pain, coughing, diaphoresis, dizziness, fever, nausea, near-syncope, numbness, syncope, vomiting or weakness. She has tried nothing for the symptoms. Risk factors include post menopause and family history. There is no history of anemia, anxiety, drug use, heart disease, hyperthyroidism or a valve disorder. Episodes a couple times a week. BP also went up to 170s systolic 100 diastolic.   Also one episode of bilateral leg cramps.   Stopped taking myrbetriq after noticed elevated blood pressure.   Previous thyroid labs were subclinical hyperthyroid. She completed lab today, results still not back.   Does have a history of SVT but has not had an episode in years. Not on beta blocker or anything for bp.  BP Readings from Last 3 Encounters:   01/26/23 120/70   12/31/22 130/77   12/16/22 100/70      Patient Active Problem List   Diagnosis    PSVT (paroxysmal supraventricular tachycardia)    Diverticulosis    Constipation, chronic    Stress incontinence    Osteopenia    Glucose intolerance (impaired glucose tolerance)    Moderate mixed hyperlipidemia not requiring statin therapy    Acquired hypothyroidism    History of colonic polyps    Vitamin D deficiency    Surgical menopause on hormone replacement therapy         Current Outpatient Medications:     calcium carbonate (OS-JUANITO) 500 mg calcium (1,250 mg) tablet, Take 2 tablets (1,000 mg total) by mouth once daily., Disp: 180 tablet, Rfl: 4    CYANOCOBALAMIN/COBAMAMIDE (B12 SL), Place 2,500 mcg under  the tongue once daily., Disp: , Rfl:     estradiol 0.1 mg/24 hr td ptwk 0.1 mg/24 hr PTWK, APPLY 1 PATCH TRANSDERMALLYONTO THE SKIN 2 TIMES A    WEEK, Disp: 24 patch, Rfl: 2    LEVOCARNITINE TARTRATE (CARNITINE, TARTRATE, ORAL), Take by mouth once daily. , Disp: , Rfl:     levothyroxine (SYNTHROID) 88 MCG tablet, Take 1 tablet (88 mcg total) by mouth before breakfast., Disp: 90 tablet, Rfl: 3    liothyronine (CYTOMEL) 5 MCG Tab, Take 1 tablet (5 mcg total) by mouth 2 (two) times a day., Disp: 180 tablet, Rfl: 3    mirabegron (MYRBETRIQ) 50 mg Tb24, Take 1 tablet (50 mg total) by mouth Daily., Disp: 90 tablet, Rfl: 1    multivitamin (THERAGRAN) per tablet, Take 1 tablet by mouth 2 (two) times daily., Disp: , Rfl:     polyethylene glycol (GLYCOLAX) 17 gram/dose powder, Take 17 g by mouth once daily., Disp: , Rfl:     TURMERIC (CURCUMIN MISC), Take by mouth once daily., Disp: , Rfl:     propranoloL (INDERAL) 20 MG tablet, Take 1 tablet (20 mg total) by mouth 3 (three) times daily as needed (heart palpitaitons)., Disp: 30 tablet, Rfl: 0    Review of Systems   Constitutional:  Positive for fatigue and malaise/fatigue. Negative for activity change, appetite change, chills, diaphoresis, fever and unexpected weight change.   HENT: Negative.  Negative for congestion, hearing loss, postnasal drip, rhinorrhea, sore throat, trouble swallowing and voice change.    Eyes: Negative.  Negative for visual disturbance.   Respiratory:  Positive for chest tightness and shortness of breath. Negative for apnea, cough, choking, wheezing and stridor.    Cardiovascular:  Positive for palpitations. Negative for chest pain, leg swelling, syncope and near-syncope.   Gastrointestinal:  Negative for abdominal distention, abdominal pain, blood in stool, constipation, diarrhea, nausea and vomiting.   Endocrine: Negative for cold intolerance, heat intolerance, polydipsia and polyuria.   Genitourinary: Negative.  Negative for difficulty urinating and  "frequency.   Musculoskeletal:  Negative for arthralgias, back pain, gait problem, joint swelling and myalgias.   Skin:  Negative for color change, pallor, rash and wound.   Neurological:  Negative for dizziness, tremors, weakness, light-headedness, numbness and headaches.   Hematological:  Negative for adenopathy.   Psychiatric/Behavioral:  Negative for agitation, behavioral problems, confusion, decreased concentration, dysphoric mood, hallucinations, self-injury, sleep disturbance and suicidal ideas. The patient is not nervous/anxious and is not hyperactive.    Objective:   /70 (BP Location: Right arm, Patient Position: Sitting, BP Method: Large (Manual))   Pulse 64   Ht 5' 4" (1.626 m)   Wt 64 kg (141 lb 1.5 oz)   SpO2 99%   BMI 24.22 kg/m²     Physical Exam  Vitals reviewed.   Constitutional:       General: She is not in acute distress.     Appearance: Normal appearance. She is well-developed. She is not ill-appearing, toxic-appearing or diaphoretic.   HENT:      Head: Normocephalic and atraumatic.      Right Ear: External ear normal.      Left Ear: External ear normal.      Nose: Nose normal.   Eyes:      Conjunctiva/sclera: Conjunctivae normal.      Pupils: Pupils are equal, round, and reactive to light.   Cardiovascular:      Rate and Rhythm: Normal rate and regular rhythm.      Heart sounds: Normal heart sounds. No murmur heard.    No friction rub. No gallop.   Pulmonary:      Effort: Pulmonary effort is normal. No respiratory distress.      Breath sounds: Normal breath sounds. No wheezing or rales.   Chest:      Chest wall: No tenderness.   Abdominal:      General: There is no distension.      Palpations: Abdomen is soft.      Tenderness: There is no abdominal tenderness.   Musculoskeletal:         General: Normal range of motion.      Cervical back: Normal range of motion and neck supple.   Lymphadenopathy:      Cervical: No cervical adenopathy.   Skin:     General: Skin is warm and dry.      " Capillary Refill: Capillary refill takes less than 2 seconds.      Findings: No rash.   Neurological:      Mental Status: She is alert and oriented to person, place, and time.      Motor: No weakness.      Coordination: Coordination normal.      Gait: Gait normal.   Psychiatric:         Mood and Affect: Mood normal.         Behavior: Behavior normal.         Thought Content: Thought content normal.         Judgment: Judgment normal.       Assessment:     1. Palpitations    2. Labile blood pressure    3. Dyspnea on exertion    4. Family history of heart disease    5. PSVT (paroxysmal supraventricular tachycardia)    6. Acquired hypothyroidism      Plan:   Palpitations  -     Ambulatory referral/consult to Cardiology; Future; Expected date: 02/02/2023  -     EKG 12-lead; Future  -     propranoloL (INDERAL) 20 MG tablet; Take 1 tablet (20 mg total) by mouth 3 (three) times daily as needed (heart palpitaitons).  Dispense: 30 tablet; Refill: 0    Labile blood pressure  -     Ambulatory referral/consult to Cardiology; Future; Expected date: 02/02/2023  -     EKG 12-lead; Future    Dyspnea on exertion  -     Ambulatory referral/consult to Cardiology; Future; Expected date: 02/02/2023    Family history of heart disease    PSVT (paroxysmal supraventricular tachycardia)    Acquired hypothyroidism    -waiting on tsh results from today.     Follow up if symptoms worsen or fail to improve.

## 2023-01-27 ENCOUNTER — OFFICE VISIT (OUTPATIENT)
Dept: SURGERY | Facility: CLINIC | Age: 68
End: 2023-01-27
Payer: COMMERCIAL

## 2023-01-27 ENCOUNTER — PATIENT MESSAGE (OUTPATIENT)
Dept: INTERNAL MEDICINE | Facility: CLINIC | Age: 68
End: 2023-01-27
Payer: MEDICARE

## 2023-01-27 ENCOUNTER — TELEPHONE (OUTPATIENT)
Dept: INTERNAL MEDICINE | Facility: CLINIC | Age: 68
End: 2023-01-27
Payer: MEDICARE

## 2023-01-27 VITALS — SYSTOLIC BLOOD PRESSURE: 123 MMHG | HEART RATE: 76 BPM | DIASTOLIC BLOOD PRESSURE: 79 MMHG

## 2023-01-27 DIAGNOSIS — L72.0 EPIDERMAL INCLUSION CYST: ICD-10-CM

## 2023-01-27 PROCEDURE — 99999 PR PBB SHADOW E&M-EST. PATIENT-LVL III: CPT | Mod: PBBFAC,,, | Performed by: SURGERY

## 2023-01-27 PROCEDURE — 99202 PR OFFICE/OUTPT VISIT, NEW, LEVL II, 15-29 MIN: ICD-10-PCS | Mod: S$GLB,,, | Performed by: SURGERY

## 2023-01-27 PROCEDURE — 99213 OFFICE O/P EST LOW 20 MIN: CPT | Mod: PBBFAC | Performed by: SURGERY

## 2023-01-27 PROCEDURE — 99999 PR PBB SHADOW E&M-EST. PATIENT-LVL III: ICD-10-PCS | Mod: PBBFAC,,, | Performed by: SURGERY

## 2023-01-27 PROCEDURE — 99202 OFFICE O/P NEW SF 15 MIN: CPT | Mod: S$GLB,,, | Performed by: SURGERY

## 2023-01-27 NOTE — TELEPHONE ENCOUNTER
EKG abnormal, but not high risk. Not an emergency.  Schedule virtual visit for us to discuss next step.

## 2023-01-27 NOTE — PROGRESS NOTES
[facility] -   General Surgery History & Physical    SUBJECTIVE:     History of Present Illness:  Patient is a 67 y.o. female presents with a right upper back cyst that has been enlarging and she would like removed. It sometimes drains foul smelling material.      Review of patient's allergies indicates:   Allergen Reactions    Levaquin [levofloxacin] Nausea And Vomiting       Current Outpatient Medications   Medication Sig Dispense Refill    calcium carbonate (OS-JUANITO) 500 mg calcium (1,250 mg) tablet Take 2 tablets (1,000 mg total) by mouth once daily. 180 tablet 4    CYANOCOBALAMIN/COBAMAMIDE (B12 SL) Place 2,500 mcg under the tongue once daily.      estradiol 0.1 mg/24 hr td ptwk 0.1 mg/24 hr PTWK APPLY 1 PATCH TRANSDERMALLYONTO THE SKIN 2 TIMES A    WEEK 24 patch 2    LEVOCARNITINE TARTRATE (CARNITINE, TARTRATE, ORAL) Take by mouth once daily.       levothyroxine (SYNTHROID) 88 MCG tablet Take 1 tablet (88 mcg total) by mouth before breakfast. 90 tablet 3    liothyronine (CYTOMEL) 5 MCG Tab Take 1 tablet (5 mcg total) by mouth 2 (two) times a day. 180 tablet 3    multivitamin (THERAGRAN) per tablet Take 1 tablet by mouth 2 (two) times daily.      polyethylene glycol (GLYCOLAX) 17 gram/dose powder Take 17 g by mouth once daily.      propranoloL (INDERAL) 20 MG tablet Take 1 tablet (20 mg total) by mouth 3 (three) times daily as needed (heart palpitaitons). 30 tablet 0    TURMERIC (CURCUMIN MISC) Take by mouth once daily.      mirabegron (MYRBETRIQ) 50 mg Tb24 Take 1 tablet (50 mg total) by mouth Daily. (Patient not taking: Reported on 1/27/2023) 90 tablet 1     No current facility-administered medications for this visit.       Past Medical History:   Diagnosis Date    Constipation, chronic     Diverticulosis     Glucose intolerance (impaired glucose tolerance) 5/3/2013    Hyperlipidemia 5/3/2013    Osteopenia     PSVT (paroxysmal supraventricular tachycardia)     Stress incontinence     Thyroid  disease      Past Surgical History:   Procedure Laterality Date    APPENDECTOMY      BLADDER SUSPENSION      BREAST BIOPSY Right     fibroadenoma    BREAST SURGERY      R breast biopsy- benign (removed fibroid adenoma)    COLONOSCOPY N/A 12/16/2022    Procedure: COLONOSCOPY;  Surgeon: Tad Adames MD;  Location: [facility];  Service: General;  Laterality: N/A;    COLONOSCOPY W/ BIOPSIES      polyps were negative    HYSTERECTOMY      OOPHORECTOMY      TONSILLECTOMY      VAGINAL HYSTERECTOMY W/ ANTERIOR AND POSTERIOR VAGINAL REPAIR       Family History   Problem Relation Age of Onset    Depression Mother     Diabetes Mother     Diabetes Father     Hypertension Father     Melanoma Neg Hx      Social History     Tobacco Use    Smoking status: Never    Smokeless tobacco: Never   Substance Use Topics    Alcohol use: Yes     Alcohol/week: 3.0 standard drinks     Types: 3 Glasses of wine per week    Drug use: No        Review of Systems:  Review of Systems   Constitutional:  Negative for fever.   Skin:  Negative for color change.     OBJECTIVE:     Vital Signs (Most Recent)  Pulse: 76 (01/27/23 1202)  BP: 123/79 (01/27/23 1202)           Physical Exam:  Physical Exam  Vitals and nursing note reviewed.   Constitutional:       General: She is not in acute distress.     Appearance: She is not ill-appearing, toxic-appearing or diaphoretic.   HENT:      Head: Normocephalic and atraumatic.      Nose: Nose normal.   Eyes:      General: No scleral icterus.        Right eye: No discharge.         Left eye: No discharge.      Extraocular Movements: Extraocular movements intact.   Cardiovascular:      Rate and Rhythm: Normal rate and regular rhythm.   Pulmonary:      Effort: No respiratory distress.      Breath sounds: No stridor.   Abdominal:      Palpations: Abdomen is soft.      Tenderness: There is no abdominal tenderness.   Musculoskeletal:      Cervical back: No rigidity.   Skin:     General: Skin is warm and dry.       Coloration: Skin is not jaundiced.      Comments: Right upper back approx 2 cm well-circumscribed mass consistent with cyst   Neurological:      Mental Status: She is alert and oriented to person, place, and time.   Psychiatric:         Mood and Affect: Mood normal.         Behavior: Behavior normal.       Laboratory  WBC   Date Value Ref Range Status   10/21/2022 7.44 3.90 - 12.70 K/uL Final     Hemoglobin   Date Value Ref Range Status   10/21/2022 14.0 12.0 - 16.0 g/dL Final     Hematocrit   Date Value Ref Range Status   10/21/2022 40.5 37.0 - 48.5 % Final     Platelets   Date Value Ref Range Status   10/21/2022 296 150 - 450 K/uL Final     Sodium   Date Value Ref Range Status   10/21/2022 139 136 - 145 mmol/L Final     Potassium   Date Value Ref Range Status   10/21/2022 4.3 3.5 - 5.1 mmol/L Final     Creatinine   Date Value Ref Range Status   10/21/2022 0.7 0.5 - 1.4 mg/dL Final     Alkaline Phosphatase   Date Value Ref Range Status   10/21/2022 46 (L) 55 - 135 U/L Final     AST   Date Value Ref Range Status   10/21/2022 10 10 - 40 U/L Final     ALT   Date Value Ref Range Status   10/21/2022 8 (L) 10 - 44 U/L Final      Hemoglobin A1C   Date Value Ref Range Status   10/21/2022 4.9 4.0 - 5.6 % Final     Comment:     ADA Screening Guidelines:  5.7-6.4%  Consistent with prediabetes  >or=6.5%  Consistent with diabetes    High levels of fetal hemoglobin interfere with the HbA1C  assay. Heterozygous hemoglobin variants (HbS, HgC, etc)do  not significantly interfere with this assay.   However, presence of multiple variants may affect accuracy.         Diagnostic Results:  No results found for this or any previous visit from the past 365 days.      No results found for this or any previous visit from the past 365 days.           ASSESSMENT/PLAN:     Mone was seen today for consult.    Diagnoses and all orders for this visit:    Epidermal inclusion cyst  -     Ambulatory referral/consult to General Surgery         Plan  for excision in the office. The procedure details and risks were discussed with the patient. She would like to schedule for 2/10.    Patient expressed understanding and is in agreement.      Wendy Tavarez, DO  General Surgery  [facility]

## 2023-02-10 ENCOUNTER — OFFICE VISIT (OUTPATIENT)
Dept: UROLOGY | Facility: CLINIC | Age: 68
End: 2023-02-10
Payer: MEDICARE

## 2023-02-10 ENCOUNTER — PROCEDURE VISIT (OUTPATIENT)
Dept: SURGERY | Facility: CLINIC | Age: 68
End: 2023-02-10
Payer: MEDICARE

## 2023-02-10 VITALS
WEIGHT: 142.44 LBS | SYSTOLIC BLOOD PRESSURE: 119 MMHG | HEART RATE: 70 BPM | DIASTOLIC BLOOD PRESSURE: 72 MMHG | BODY MASS INDEX: 24.45 KG/M2

## 2023-02-10 VITALS
WEIGHT: 141.31 LBS | HEIGHT: 64 IN | HEART RATE: 66 BPM | RESPIRATION RATE: 18 BRPM | BODY MASS INDEX: 24.13 KG/M2 | TEMPERATURE: 98 F | SYSTOLIC BLOOD PRESSURE: 129 MMHG | DIASTOLIC BLOOD PRESSURE: 80 MMHG

## 2023-02-10 DIAGNOSIS — K59.09 CONSTIPATION, CHRONIC: ICD-10-CM

## 2023-02-10 DIAGNOSIS — L72.0 EPIDERMAL INCLUSION CYST: Primary | ICD-10-CM

## 2023-02-10 DIAGNOSIS — N39.46 MIXED INCONTINENCE: Primary | ICD-10-CM

## 2023-02-10 PROCEDURE — 1159F PR MEDICATION LIST DOCUMENTED IN MEDICAL RECORD: ICD-10-PCS | Mod: CPTII,S$GLB,, | Performed by: UROLOGY

## 2023-02-10 PROCEDURE — 21930 EXC, CYST: ICD-10-PCS | Mod: S$GLB,,, | Performed by: SURGERY

## 2023-02-10 PROCEDURE — 3079F DIAST BP 80-89 MM HG: CPT | Mod: CPTII,S$GLB,, | Performed by: UROLOGY

## 2023-02-10 PROCEDURE — 1101F PT FALLS ASSESS-DOCD LE1/YR: CPT | Mod: CPTII,S$GLB,, | Performed by: UROLOGY

## 2023-02-10 PROCEDURE — 99999 PR PBB SHADOW E&M-EST. PATIENT-LVL IV: CPT | Mod: PBBFAC,,, | Performed by: UROLOGY

## 2023-02-10 PROCEDURE — 3288F FALL RISK ASSESSMENT DOCD: CPT | Mod: CPTII,S$GLB,, | Performed by: UROLOGY

## 2023-02-10 PROCEDURE — 99214 OFFICE O/P EST MOD 30 MIN: CPT | Mod: S$GLB,,, | Performed by: UROLOGY

## 2023-02-10 PROCEDURE — 51798 US URINE CAPACITY MEASURE: CPT | Mod: S$GLB,,, | Performed by: UROLOGY

## 2023-02-10 PROCEDURE — 81002 PR URINALYSIS NONAUTO W/O SCOPE: ICD-10-PCS | Mod: S$GLB,,, | Performed by: UROLOGY

## 2023-02-10 PROCEDURE — 3074F SYST BP LT 130 MM HG: CPT | Mod: CPTII,S$GLB,, | Performed by: UROLOGY

## 2023-02-10 PROCEDURE — 99214 PR OFFICE/OUTPT VISIT, EST, LEVL IV, 30-39 MIN: ICD-10-PCS | Mod: S$GLB,,, | Performed by: UROLOGY

## 2023-02-10 PROCEDURE — 3288F PR FALLS RISK ASSESSMENT DOCUMENTED: ICD-10-PCS | Mod: CPTII,S$GLB,, | Performed by: UROLOGY

## 2023-02-10 PROCEDURE — 99999 PR PBB SHADOW E&M-EST. PATIENT-LVL IV: ICD-10-PCS | Mod: PBBFAC,,, | Performed by: UROLOGY

## 2023-02-10 PROCEDURE — 1159F MED LIST DOCD IN RCRD: CPT | Mod: CPTII,S$GLB,, | Performed by: UROLOGY

## 2023-02-10 PROCEDURE — 3074F PR MOST RECENT SYSTOLIC BLOOD PRESSURE < 130 MM HG: ICD-10-PCS | Mod: CPTII,S$GLB,, | Performed by: UROLOGY

## 2023-02-10 PROCEDURE — 21930 EXC BACK LES SC < 3 CM: CPT | Mod: S$GLB,,, | Performed by: SURGERY

## 2023-02-10 PROCEDURE — 1126F AMNT PAIN NOTED NONE PRSNT: CPT | Mod: CPTII,S$GLB,, | Performed by: UROLOGY

## 2023-02-10 PROCEDURE — 1101F PR PT FALLS ASSESS DOC 0-1 FALLS W/OUT INJ PAST YR: ICD-10-PCS | Mod: CPTII,S$GLB,, | Performed by: UROLOGY

## 2023-02-10 PROCEDURE — 3079F PR MOST RECENT DIASTOLIC BLOOD PRESSURE 80-89 MM HG: ICD-10-PCS | Mod: CPTII,S$GLB,, | Performed by: UROLOGY

## 2023-02-10 PROCEDURE — 51798 PR MEAS,POST-VOID RES,US,NON-IMAGING: ICD-10-PCS | Mod: S$GLB,,, | Performed by: UROLOGY

## 2023-02-10 PROCEDURE — 1126F PR PAIN SEVERITY QUANTIFIED, NO PAIN PRESENT: ICD-10-PCS | Mod: CPTII,S$GLB,, | Performed by: UROLOGY

## 2023-02-10 PROCEDURE — 3008F BODY MASS INDEX DOCD: CPT | Mod: CPTII,S$GLB,, | Performed by: UROLOGY

## 2023-02-10 PROCEDURE — 3008F PR BODY MASS INDEX (BMI) DOCUMENTED: ICD-10-PCS | Mod: CPTII,S$GLB,, | Performed by: UROLOGY

## 2023-02-10 PROCEDURE — 81002 URINALYSIS NONAUTO W/O SCOPE: CPT | Mod: S$GLB,,, | Performed by: UROLOGY

## 2023-02-10 RX ORDER — TOLTERODINE 4 MG/1
4 CAPSULE, EXTENDED RELEASE ORAL DAILY
Qty: 90 CAPSULE | Refills: 1 | Status: SHIPPED | OUTPATIENT
Start: 2023-02-10 | End: 2023-12-06

## 2023-02-10 NOTE — PROGRESS NOTES
CC: urinary incontinence        History of Present Illness:     2/10/23- myrbetriq raised her blood pressure, so she stopped it. Took it for a couple of weeks and noticed she had less frequency/urgency, but still having ALICE and needing to wear pads. Symptoms are worse when she has caffeine or a lot of water.   11/11/22- 66yo female here for evaluation of urinary incontinence. She has a prior h/o A+P repair (no mesh). Also had a bladder lift during hysterectomy.   She reports urge incontinence. Wears poise pads, size 4. Sometimes she pushes with the toilet paper to fully empty her bladder. She splints to have a BM. Occasional PRABHJOT. UUI>PRABHJOT. Hasn't been on any bladder meds. No UTIs, dysuria or gross hematuria. No feelings of incomplete emptying. Takes MiraLax daily for constipation.   Symptoms are worsened by drinking caffeine or drinking cold water.     Review of Systems   Constitutional:  Negative for chills and fever.   Respiratory:  Negative for shortness of breath.    Cardiovascular:  Negative for chest pain.   Gastrointestinal:  Positive for constipation.   Genitourinary:  Positive for urgency. Negative for difficulty urinating.   All other systems reviewed and are negative.      Past Medical History:   Diagnosis Date    Constipation, chronic     Diverticulosis     Glucose intolerance (impaired glucose tolerance) 5/3/2013    Hyperlipidemia 5/3/2013    Osteopenia     PSVT (paroxysmal supraventricular tachycardia)     Stress incontinence     Thyroid disease        Past Surgical History:   Procedure Laterality Date    APPENDECTOMY      BLADDER SUSPENSION      BREAST BIOPSY Right     fibroadenoma    BREAST SURGERY      R breast biopsy- benign (removed fibroid adenoma)    COLONOSCOPY N/A 12/16/2022    Procedure: COLONOSCOPY;  Surgeon: Tad Adames MD;  Location: [facility];  Service: General;  Laterality: N/A;    COLONOSCOPY W/ BIOPSIES      polyps were negative    HYSTERECTOMY      OOPHORECTOMY       TONSILLECTOMY      VAGINAL HYSTERECTOMY W/ ANTERIOR AND POSTERIOR VAGINAL REPAIR         Family History   Problem Relation Age of Onset    Depression Mother     Diabetes Mother     Diabetes Father     Hypertension Father     Melanoma Neg Hx        Social History     Tobacco Use    Smoking status: Never    Smokeless tobacco: Never   Substance Use Topics    Alcohol use: Yes     Alcohol/week: 3.0 standard drinks     Types: 3 Glasses of wine per week    Drug use: No       Current Outpatient Medications   Medication Sig Dispense Refill    calcium carbonate (OS-JUANITO) 500 mg calcium (1,250 mg) tablet Take 2 tablets (1,000 mg total) by mouth once daily. 180 tablet 4    CYANOCOBALAMIN/COBAMAMIDE (B12 SL) Place 2,500 mcg under the tongue once daily.      estradiol 0.1 mg/24 hr td ptwk 0.1 mg/24 hr PTWK APPLY 1 PATCH TRANSDERMALLYONTO THE SKIN 2 TIMES A    WEEK 24 patch 2    LEVOCARNITINE TARTRATE (CARNITINE, TARTRATE, ORAL) Take by mouth once daily.       levothyroxine (SYNTHROID) 88 MCG tablet Take 1 tablet (88 mcg total) by mouth before breakfast. 90 tablet 3    liothyronine (CYTOMEL) 5 MCG Tab Take 1 tablet (5 mcg total) by mouth 2 (two) times a day. 180 tablet 3    multivitamin (THERAGRAN) per tablet Take 1 tablet by mouth 2 (two) times daily.      polyethylene glycol (GLYCOLAX) 17 gram/dose powder Take 17 g by mouth once daily.      propranoloL (INDERAL) 20 MG tablet Take 1 tablet (20 mg total) by mouth 3 (three) times daily as needed (heart palpitaitons). 30 tablet 0    TURMERIC (CURCUMIN MISC) Take by mouth once daily.      tolterodine (DETROL LA) 4 MG 24 hr capsule Take 1 capsule (4 mg total) by mouth once daily. 90 capsule 1     No current facility-administered medications for this visit.       Review of patient's allergies indicates:   Allergen Reactions    Levaquin [levofloxacin] Nausea And Vomiting       Physical Exam  Vitals:    02/10/23 1229   BP: 129/80   Pulse: 66   Resp: 18   Temp: 97.7 °F (36.5 °C)      General: Well-developed, well-nourished, in no acute distress  HEENT: Normocephalic, atraumatic, extraocular movements intact  Neck: Supple, no supraclavicular or cervical lymphadenopathy, trachea midline  Respirations: even and unlabored  Back: midline spine, No CVA tenderness  Abdomen: soft, Non-tender, non-distended, no palpable masses, no rebound or guarding, well-healed pfannenstiel scar  : 11/11/22-Normal external female genitalia without lesions. Orthotopic urethral meatus. atrophic vaginal mucosa. Grade 1 Cysotcele, Grade 2 Rectocele, Grade 1 apical prolapse, negative cough stress test, + urethral hypermobility  Extremities: moves all equally, no clubbing, cyanosis or edema  Skin: Warm and dry. No lesions  Psych: normal affect  Neuro: Alert and oriented x 3. Cranial nerves II-XII intact    PVR: 108cc 2/10/23    Urinalysis  Negative for blood, LE, nit      Assessment:  1. Mixed incontinence  POCT URINE DIPSTICK WITHOUT MICROSCOPE    POCT Bladder Scan      2. Constipation, chronic                Plan:   Mixed incontinence  -     POCT URINE DIPSTICK WITHOUT MICROSCOPE  -     POCT Bladder Scan    Constipation, chronic    Other orders  -     tolterodine (DETROL LA) 4 MG 24 hr capsule; Take 1 capsule (4 mg total) by mouth once daily.  Dispense: 90 capsule; Refill: 1      Discussed pharmacologic drug classes. Specifically discussed the constipating effects of anticholinergics. She would like to try. Continue bowel regimen with Metamucil/MiraLax. She is considering botox    Follow up in about 3 months (around 5/10/2023).

## 2023-02-10 NOTE — PROCEDURES
Exc, Cyst    Date/Time: 2/10/2023 8:30 AM  Performed by: Wendy Tavarez,   Authorized by: Wendy Tavarez, DO     Consent Done?:  Yes (Written)  Timeout: prior to procedure the correct patient, procedure, and site was verified    Prep: patient was prepped and draped in usual sterile fashion    Local anesthesia used?: Yes    Anesthesia:  Local infiltration  Local anesthetic:  Lidocaine 2% with epinephrine  Anesthetic total (ml):  5  Assistants?: No    Indications:  Cyst  Body area:  Back / flank  Laterality:  Right  Position:  Lateral   Patient was prepped and draped in the normal sterile fashion.  Anesthesia:  Local infiltration  Local anesthetic:  Lidocaine 2% with epinephrine  Excision type:  Tumor  Excision depth:  Subcutaneous  Excision size (cm):  2  Scalpel size:  15  Incision type:  Single straight  Specimens?: No     Hemostasis was obtained.  Estimated blood loss (cc):  2  Wound closure:  Intermediate layered  Wound repair size (cm):  1.5  Sutures:  3-0 Vicryl and 4-0 Monocryl  Sterile dressings:  Other (comments) (dermabond)  Post-op diagnosis:  Same as pre-op diagnosis.   Needle, instrument, and sponge counts were correct.   Patient tolerated the procedure well with no immediate complications.   Post-operative instructions were provided for the patient.  Comments:      Patient declined sending specimen to pathology.

## 2023-02-16 ENCOUNTER — PATIENT MESSAGE (OUTPATIENT)
Dept: OBSTETRICS AND GYNECOLOGY | Facility: CLINIC | Age: 68
End: 2023-02-16
Payer: MEDICARE

## 2023-02-16 ENCOUNTER — PATIENT MESSAGE (OUTPATIENT)
Dept: INTERNAL MEDICINE | Facility: CLINIC | Age: 68
End: 2023-02-16
Payer: MEDICARE

## 2023-02-21 ENCOUNTER — OFFICE VISIT (OUTPATIENT)
Dept: INTERNAL MEDICINE | Facility: CLINIC | Age: 68
End: 2023-02-21
Payer: MEDICARE

## 2023-02-21 ENCOUNTER — PATIENT MESSAGE (OUTPATIENT)
Dept: INTERNAL MEDICINE | Facility: CLINIC | Age: 68
End: 2023-02-21

## 2023-02-21 ENCOUNTER — TELEPHONE (OUTPATIENT)
Dept: INTERNAL MEDICINE | Facility: CLINIC | Age: 68
End: 2023-02-21

## 2023-02-21 VITALS
HEIGHT: 64 IN | BODY MASS INDEX: 24.28 KG/M2 | DIASTOLIC BLOOD PRESSURE: 80 MMHG | HEART RATE: 82 BPM | OXYGEN SATURATION: 99 % | WEIGHT: 142.19 LBS | TEMPERATURE: 97 F | SYSTOLIC BLOOD PRESSURE: 122 MMHG

## 2023-02-21 DIAGNOSIS — E03.9 ACQUIRED HYPOTHYROIDISM: Primary | Chronic | ICD-10-CM

## 2023-02-21 DIAGNOSIS — Z23 NEED FOR COVID-19 VACCINE: ICD-10-CM

## 2023-02-21 DIAGNOSIS — E78.2 MODERATE MIXED HYPERLIPIDEMIA NOT REQUIRING STATIN THERAPY: Chronic | ICD-10-CM

## 2023-02-21 DIAGNOSIS — E89.40 SURGICAL MENOPAUSE ON HORMONE REPLACEMENT THERAPY: Chronic | ICD-10-CM

## 2023-02-21 DIAGNOSIS — Z79.890 SURGICAL MENOPAUSE ON HORMONE REPLACEMENT THERAPY: Chronic | ICD-10-CM

## 2023-02-21 DIAGNOSIS — Z23 NEED FOR PNEUMOCOCCAL VACCINE: ICD-10-CM

## 2023-02-21 PROCEDURE — 99214 PR OFFICE/OUTPT VISIT, EST, LEVL IV, 30-39 MIN: ICD-10-PCS | Mod: S$GLB,,, | Performed by: FAMILY MEDICINE

## 2023-02-21 PROCEDURE — 1159F PR MEDICATION LIST DOCUMENTED IN MEDICAL RECORD: ICD-10-PCS | Mod: CPTII,S$GLB,, | Performed by: FAMILY MEDICINE

## 2023-02-21 PROCEDURE — 1126F AMNT PAIN NOTED NONE PRSNT: CPT | Mod: CPTII,S$GLB,, | Performed by: FAMILY MEDICINE

## 2023-02-21 PROCEDURE — 1101F PT FALLS ASSESS-DOCD LE1/YR: CPT | Mod: CPTII,S$GLB,, | Performed by: FAMILY MEDICINE

## 2023-02-21 PROCEDURE — 3074F SYST BP LT 130 MM HG: CPT | Mod: CPTII,S$GLB,, | Performed by: FAMILY MEDICINE

## 2023-02-21 PROCEDURE — 3079F PR MOST RECENT DIASTOLIC BLOOD PRESSURE 80-89 MM HG: ICD-10-PCS | Mod: CPTII,S$GLB,, | Performed by: FAMILY MEDICINE

## 2023-02-21 PROCEDURE — 1160F PR REVIEW ALL MEDS BY PRESCRIBER/CLIN PHARMACIST DOCUMENTED: ICD-10-PCS | Mod: CPTII,S$GLB,, | Performed by: FAMILY MEDICINE

## 2023-02-21 PROCEDURE — 3008F BODY MASS INDEX DOCD: CPT | Mod: CPTII,S$GLB,, | Performed by: FAMILY MEDICINE

## 2023-02-21 PROCEDURE — 1159F MED LIST DOCD IN RCRD: CPT | Mod: CPTII,S$GLB,, | Performed by: FAMILY MEDICINE

## 2023-02-21 PROCEDURE — 1101F PR PT FALLS ASSESS DOC 0-1 FALLS W/OUT INJ PAST YR: ICD-10-PCS | Mod: CPTII,S$GLB,, | Performed by: FAMILY MEDICINE

## 2023-02-21 PROCEDURE — 3288F PR FALLS RISK ASSESSMENT DOCUMENTED: ICD-10-PCS | Mod: CPTII,S$GLB,, | Performed by: FAMILY MEDICINE

## 2023-02-21 PROCEDURE — 99051 MED SERV EVE/WKEND/HOLIDAY: CPT | Mod: S$GLB,,, | Performed by: FAMILY MEDICINE

## 2023-02-21 PROCEDURE — 1160F RVW MEDS BY RX/DR IN RCRD: CPT | Mod: CPTII,S$GLB,, | Performed by: FAMILY MEDICINE

## 2023-02-21 PROCEDURE — 3288F FALL RISK ASSESSMENT DOCD: CPT | Mod: CPTII,S$GLB,, | Performed by: FAMILY MEDICINE

## 2023-02-21 PROCEDURE — 99214 OFFICE O/P EST MOD 30 MIN: CPT | Mod: S$GLB,,, | Performed by: FAMILY MEDICINE

## 2023-02-21 PROCEDURE — 1126F PR PAIN SEVERITY QUANTIFIED, NO PAIN PRESENT: ICD-10-PCS | Mod: CPTII,S$GLB,, | Performed by: FAMILY MEDICINE

## 2023-02-21 PROCEDURE — 3074F PR MOST RECENT SYSTOLIC BLOOD PRESSURE < 130 MM HG: ICD-10-PCS | Mod: CPTII,S$GLB,, | Performed by: FAMILY MEDICINE

## 2023-02-21 PROCEDURE — 99051 PR MEDICAL SERVICES, EVE/WKEND/HOLIDAY: ICD-10-PCS | Mod: S$GLB,,, | Performed by: FAMILY MEDICINE

## 2023-02-21 PROCEDURE — 99999 PR PBB SHADOW E&M-EST. PATIENT-LVL IV: ICD-10-PCS | Mod: PBBFAC,,, | Performed by: FAMILY MEDICINE

## 2023-02-21 PROCEDURE — 99999 PR PBB SHADOW E&M-EST. PATIENT-LVL IV: CPT | Mod: PBBFAC,,, | Performed by: FAMILY MEDICINE

## 2023-02-21 PROCEDURE — 3008F PR BODY MASS INDEX (BMI) DOCUMENTED: ICD-10-PCS | Mod: CPTII,S$GLB,, | Performed by: FAMILY MEDICINE

## 2023-02-21 PROCEDURE — 3079F DIAST BP 80-89 MM HG: CPT | Mod: CPTII,S$GLB,, | Performed by: FAMILY MEDICINE

## 2023-02-21 RX ORDER — LEVOTHYROXINE SODIUM 88 UG/1
88 TABLET ORAL
Qty: 90 TABLET | Refills: 3 | Status: SHIPPED | OUTPATIENT
Start: 2023-02-21 | End: 2023-12-01 | Stop reason: DRUGHIGH

## 2023-02-21 RX ORDER — LIOTHYRONINE SODIUM 5 UG/1
5 TABLET ORAL 2 TIMES DAILY
Qty: 180 TABLET | Refills: 3 | Status: SHIPPED | OUTPATIENT
Start: 2023-02-21 | End: 2023-02-21 | Stop reason: SDUPTHER

## 2023-02-21 NOTE — PROGRESS NOTES
OFFICE VISIT 2/21/23  7:30 AM CST    Subjective   CHIEF COMPLAINT: Follow-up  She has appointment with cardiology next week for evaluation of palpitations, which are stable. Otherwise, chronic conditions are represented as and appear to be compensated/controlled and stable. Data used to assess the status of specific chronic problems are included below.     Review of Systems   Respiratory:  Negative for chest tightness and shortness of breath.    Cardiovascular:  Negative for chest pain.   Neurological:  Negative for syncope.     Assessment & Plan   1. Acquired hypothyroidism  Assessment & Plan:  Lab Results   Component Value Date    TSH 0.211 (L) 01/26/2023    TSH 0.320 (L) 10/21/2022    TSH 0.045 (L) 08/09/2022    FREET4 0.91 01/26/2023    FREET4 0.88 10/21/2022    FREET4 0.86 10/21/2022     No results found for: THYROIDSTIMI, THYROPEROXID, THYGLBTUM, THGABSCRN, THYRGINTERP     Orders:  -     levothyroxine (SYNTHROID) 88 MCG tablet; Take 1 tablet (88 mcg total) by mouth before breakfast.  Dispense: 90 tablet; Refill: 3  -     liothyronine (CYTOMEL) 5 MCG Tab; Take 1 tablet (5 mcg total) by mouth 2 (two) times a day.  Dispense: 180 tablet; Refill: 3  -     T3; Future; Expected date: 08/16/2023  -     T4, Free; Future; Expected date: 08/16/2023  -     TSH; Future; Expected date: 08/16/2023    2. Glucose intolerance (impaired glucose tolerance)  Assessment & Plan:  Lab Results   Component Value Date    HGBA1C 4.9 10/21/2022    HGBA1C 4.7 10/26/2021    HGBA1C 4.9 09/23/2020    HGBA1C 4.8 09/17/2019    HGBA1C 4.8 09/13/2018    GLU 95 10/21/2022    GLU 98 08/09/2022    GLU 95 10/26/2021     09/23/2020    GLU 82 09/17/2019     No results found for: GLUCFAST, ECGX5EI, FONO3XO  No results found for: LABINSU       3. 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      4. Moderate mixed hyperlipidemia not requiring statin  "therapy  Assessment & Plan:  Lab Results   Component Value Date    CHOL 193 10/21/2022    CHOL 174 08/09/2022    TRIG 118 10/21/2022    TRIG 100 08/09/2022    HDL 45 10/21/2022    HDL 44 08/09/2022    LDLCALC 124.4 10/21/2022    LDLCALC 110.0 08/09/2022    NONHDLCHOL 148 10/21/2022    NONHDLCHOL 130 08/09/2022    AST 10 10/21/2022    ALT 8 (L) 10/21/2022     The 10-year ASCVD risk score (Saurabh QUEZADA, et al., 2019) is: 6%    Values used to calculate the score:      Age: 67 years      Sex: Female      Is Non- : No      Diabetic: No      Tobacco smoker: No      Systolic Blood Pressure: 122 mmHg      Is BP treated: No      HDL Cholesterol: 45 mg/dL      Total Cholesterol: 193 mg/dL       5. Need for pneumococcal vaccine    6. Need for COVID-19 vaccine    Unless noted herein, any chronic conditions are represented as and appear stable, and no other significant complaints or concerns were reported.    Follow up in about 6 months (around 8/21/2023).   Future Appointments   Date Time Provider Department Center   2/27/2023  2:00 PM Tae Zaragoza MD [facility] CARDIO [place]   5/10/2023  4:15 PM Ratna Askew MD [facility] [place]     Objective   Vitals:    02/21/23 0723   BP: 122/80   BP Location: Right arm   Patient Position: Sitting   BP Method: Medium (Manual)   Pulse: 82   Temp: 96.6 °F (35.9 °C)   TempSrc: Tympanic   SpO2: 99%   Weight: 64.5 kg (142 lb 3.2 oz)   Height: 5' 4" (1.626 m)   Physical Exam  Vitals reviewed.   Constitutional:       General: She is not in acute distress.     Appearance: Normal appearance. She is not ill-appearing, toxic-appearing or diaphoretic.   Cardiovascular:      Rate and Rhythm: Normal rate and regular rhythm.   Pulmonary:      Effort: Pulmonary effort is normal.   Neurological:      General: No focal deficit present.      Mental Status: She is alert and oriented to person, place, and time. Mental status is at baseline.   Psychiatric:         Mood and Affect: " "Mood normal.         Behavior: Behavior normal.         Judgment: Judgment normal.      Documentation entered by me for this encounter may have been done in part using speech-recognition technology. Although I have made an effort to ensure accuracy, "sound like" errors may exist and should be interpreted in context.   "

## 2023-02-21 NOTE — ASSESSMENT & PLAN NOTE
Lab Results   Component Value Date    CHOL 193 10/21/2022    CHOL 174 08/09/2022    TRIG 118 10/21/2022    TRIG 100 08/09/2022    HDL 45 10/21/2022    HDL 44 08/09/2022    LDLCALC 124.4 10/21/2022    LDLCALC 110.0 08/09/2022    NONHDLCHOL 148 10/21/2022    NONHDLCHOL 130 08/09/2022    AST 10 10/21/2022    ALT 8 (L) 10/21/2022     The 10-year ASCVD risk score (Saurabh QUEZADA, et al., 2019) is: 6%    Values used to calculate the score:      Age: 67 years      Sex: Female      Is Non- : No      Diabetic: No      Tobacco smoker: No      Systolic Blood Pressure: 122 mmHg      Is BP treated: No      HDL Cholesterol: 45 mg/dL      Total Cholesterol: 193 mg/dL

## 2023-02-21 NOTE — ASSESSMENT & PLAN NOTE
Lab Results   Component Value Date    HGBA1C 4.9 10/21/2022    HGBA1C 4.7 10/26/2021    HGBA1C 4.9 09/23/2020    HGBA1C 4.8 09/17/2019    HGBA1C 4.8 09/13/2018    GLU 95 10/21/2022    GLU 98 08/09/2022    GLU 95 10/26/2021     09/23/2020    GLU 82 09/17/2019     No results found for: GLUCFAST, NVNR0HX, VGII1UJ  No results found for: LABINSU

## 2023-02-21 NOTE — PATIENT INSTRUCTIONS
I recommend that you get the new bi-valent COVID-19 vaccine booster that protects against the Omicron sub-variants of COVID-19.    People who are fully vaccinated are much better protected from severe illness (hospitalization and death) from COVID-19 than people who are unvaccinated or not fully vaccinated.    You can learn more about the COVID-19 vaccine and booster shot options at [email or web address]://[email or web address].    You can schedule an appointment for your COVID-19 booster vaccination with [facility] or by calling [phone].    Please take care of yourself. You are important to me.

## 2023-02-21 NOTE — ASSESSMENT & PLAN NOTE
Lab Results   Component Value Date    TSH 0.211 (L) 01/26/2023    TSH 0.320 (L) 10/21/2022    TSH 0.045 (L) 08/09/2022    FREET4 0.91 01/26/2023    FREET4 0.88 10/21/2022    FREET4 0.86 10/21/2022     No results found for: THYROIDSTIMI, THYROPEROXID, THYGLBTUM, THGABSCRN, THYRGINTERP

## 2023-02-27 ENCOUNTER — HOSPITAL ENCOUNTER (OUTPATIENT)
Dept: RADIOLOGY | Facility: HOSPITAL | Age: 68
Discharge: HOME | End: 2023-02-27
Attending: INTERNAL MEDICINE
Payer: MEDICARE

## 2023-02-27 ENCOUNTER — OFFICE VISIT (OUTPATIENT)
Dept: CARDIOLOGY | Facility: CLINIC | Age: 68
End: 2023-02-27
Payer: MEDICARE

## 2023-02-27 VITALS
DIASTOLIC BLOOD PRESSURE: 82 MMHG | OXYGEN SATURATION: 99 % | BODY MASS INDEX: 24.46 KG/M2 | HEART RATE: 66 BPM | SYSTOLIC BLOOD PRESSURE: 134 MMHG | WEIGHT: 143.31 LBS | HEIGHT: 64 IN

## 2023-02-27 DIAGNOSIS — R53.83 OTHER FATIGUE: ICD-10-CM

## 2023-02-27 DIAGNOSIS — R94.31 ABNORMAL ECG: ICD-10-CM

## 2023-02-27 DIAGNOSIS — R09.89 LABILE BLOOD PRESSURE: ICD-10-CM

## 2023-02-27 DIAGNOSIS — R00.2 PALPITATIONS: Primary | ICD-10-CM

## 2023-02-27 DIAGNOSIS — R06.09 DOE (DYSPNEA ON EXERTION): ICD-10-CM

## 2023-02-27 DIAGNOSIS — I47.10 PSVT (PAROXYSMAL SUPRAVENTRICULAR TACHYCARDIA): Chronic | ICD-10-CM

## 2023-02-27 DIAGNOSIS — R00.2 PALPITATIONS: ICD-10-CM

## 2023-02-27 DIAGNOSIS — I20.9 AP (ANGINA PECTORIS): ICD-10-CM

## 2023-02-27 DIAGNOSIS — I34.0 NONRHEUMATIC MITRAL VALVE REGURGITATION: ICD-10-CM

## 2023-02-27 DIAGNOSIS — I47.10 PSVT (PAROXYSMAL SUPRAVENTRICULAR TACHYCARDIA): ICD-10-CM

## 2023-02-27 DIAGNOSIS — R06.09 DYSPNEA ON EXERTION: ICD-10-CM

## 2023-02-27 PROCEDURE — 3079F DIAST BP 80-89 MM HG: CPT | Mod: CPTII,S$GLB,, | Performed by: INTERNAL MEDICINE

## 2023-02-27 PROCEDURE — 99999 PR PBB SHADOW E&M-EST. PATIENT-LVL III: CPT | Mod: PBBFAC,,, | Performed by: INTERNAL MEDICINE

## 2023-02-27 PROCEDURE — 99205 PR OFFICE/OUTPT VISIT, NEW, LEVL V, 60-74 MIN: ICD-10-PCS | Mod: S$GLB,,, | Performed by: INTERNAL MEDICINE

## 2023-02-27 PROCEDURE — 1160F PR REVIEW ALL MEDS BY PRESCRIBER/CLIN PHARMACIST DOCUMENTED: ICD-10-PCS | Mod: CPTII,S$GLB,, | Performed by: INTERNAL MEDICINE

## 2023-02-27 PROCEDURE — 3075F PR MOST RECENT SYSTOLIC BLOOD PRESS GE 130-139MM HG: ICD-10-PCS | Mod: CPTII,S$GLB,, | Performed by: INTERNAL MEDICINE

## 2023-02-27 PROCEDURE — 3075F SYST BP GE 130 - 139MM HG: CPT | Mod: CPTII,S$GLB,, | Performed by: INTERNAL MEDICINE

## 2023-02-27 PROCEDURE — 71046 XR CHEST PA AND LATERAL: ICD-10-PCS | Mod: 26,,, | Performed by: RADIOLOGY

## 2023-02-27 PROCEDURE — 71046 X-RAY EXAM CHEST 2 VIEWS: CPT | Mod: TC

## 2023-02-27 PROCEDURE — 71046 X-RAY EXAM CHEST 2 VIEWS: CPT | Mod: 26,,, | Performed by: RADIOLOGY

## 2023-02-27 PROCEDURE — 99999 PR PBB SHADOW E&M-EST. PATIENT-LVL III: ICD-10-PCS | Mod: PBBFAC,,, | Performed by: INTERNAL MEDICINE

## 2023-02-27 PROCEDURE — 3008F BODY MASS INDEX DOCD: CPT | Mod: CPTII,S$GLB,, | Performed by: INTERNAL MEDICINE

## 2023-02-27 PROCEDURE — 1160F RVW MEDS BY RX/DR IN RCRD: CPT | Mod: CPTII,S$GLB,, | Performed by: INTERNAL MEDICINE

## 2023-02-27 PROCEDURE — 1159F PR MEDICATION LIST DOCUMENTED IN MEDICAL RECORD: ICD-10-PCS | Mod: CPTII,S$GLB,, | Performed by: INTERNAL MEDICINE

## 2023-02-27 PROCEDURE — 3008F PR BODY MASS INDEX (BMI) DOCUMENTED: ICD-10-PCS | Mod: CPTII,S$GLB,, | Performed by: INTERNAL MEDICINE

## 2023-02-27 PROCEDURE — 3079F PR MOST RECENT DIASTOLIC BLOOD PRESSURE 80-89 MM HG: ICD-10-PCS | Mod: CPTII,S$GLB,, | Performed by: INTERNAL MEDICINE

## 2023-02-27 PROCEDURE — 1159F MED LIST DOCD IN RCRD: CPT | Mod: CPTII,S$GLB,, | Performed by: INTERNAL MEDICINE

## 2023-02-27 PROCEDURE — 99205 OFFICE O/P NEW HI 60 MIN: CPT | Mod: S$GLB,,, | Performed by: INTERNAL MEDICINE

## 2023-02-27 NOTE — PROGRESS NOTES
"Subjective:    Patient ID:  Mone Best is a 67 y.o. female who presents for evaluation of Palpitations and Chest Pain    Pt referred by RAHEEL Diaz      HPI Pt presents for palpitations.  She has hypothyroidism, MR, h/o SVT.  Nonsmoker.  Pt saw PCP clinic last month, reported palpitations and SOLOMON and chest heaviness.  BP was also elevated.  Propranolol prescribed by PCP but she is not taking it.  Referred to Cards.  ECG 1/26/23 NSR, abnl R wave progression precordial leads.  6 episodes over last 2 months, elevated HR, weakness, chest heaviness.  15 minutes or so.  Some chest heaviness with exertion.  Has "exhaustion" with activity.  Exercises twice week.  - stress test 2018.  Echo 2014 normal EF, mild-mod MR.  BP has been elevated.      Past Medical History:   Diagnosis Date    Constipation, chronic     Diverticulosis     Glucose intolerance (impaired glucose tolerance) 5/3/2013    Hyperlipidemia 5/3/2013    Osteopenia     PSVT (paroxysmal supraventricular tachycardia)     Stress incontinence     Thyroid disease        Current Outpatient Medications:     calcium carbonate (OS-JUANITO) 500 mg calcium (1,250 mg) tablet, Take 2 tablets (1,000 mg total) by mouth once daily., Disp: 180 tablet, Rfl: 4    CYANOCOBALAMIN/COBAMAMIDE (B12 SL), Place 2,500 mcg under the tongue once daily., Disp: , Rfl:     estradiol 0.1 mg/24 hr td ptwk 0.1 mg/24 hr PTWK, APPLY 1 PATCH TRANSDERMALLYONTO THE SKIN 2 TIMES A    WEEK, Disp: 24 patch, Rfl: 2    LEVOCARNITINE TARTRATE (CARNITINE, TARTRATE, ORAL), Take by mouth once daily. , Disp: , Rfl:     levothyroxine (SYNTHROID) 88 MCG tablet, Take 1 tablet (88 mcg total) by mouth before breakfast., Disp: 90 tablet, Rfl: 3    liothyronine (CYTOMEL) 5 MCG Tab, Take 1 tablet (5 mcg total) by mouth 2 (two) times a day., Disp: 180 tablet, Rfl: 2    multivitamin (THERAGRAN) per tablet, Take 1 tablet by mouth 2 (two) times daily., Disp: , Rfl:     polyethylene glycol (GLYCOLAX) 17 " "gram/dose powder, Take 17 g by mouth once daily., Disp: , Rfl:     propranoloL (INDERAL) 20 MG tablet, Take 1 tablet (20 mg total) by mouth 3 (three) times daily as needed (heart palpitaitons)., Disp: 30 tablet, Rfl: 0    tolterodine (DETROL LA) 4 MG 24 hr capsule, Take 1 capsule (4 mg total) by mouth once daily., Disp: 90 capsule, Rfl: 1    TURMERIC (CURCUMIN MISC), Take by mouth once daily., Disp: , Rfl:       Review of Systems   Constitutional: Positive for malaise/fatigue.   HENT: Negative.     Eyes: Negative.    Cardiovascular:  Positive for chest pain, dyspnea on exertion and palpitations.   Respiratory:  Positive for shortness of breath.    Endocrine: Negative.    Hematologic/Lymphatic: Negative.    Skin: Negative.    Musculoskeletal: Negative.    Gastrointestinal: Negative.    Genitourinary: Negative.    Neurological: Negative.    Psychiatric/Behavioral: Negative.     Allergic/Immunologic: Negative.         /82 (BP Location: Left arm, Patient Position: Sitting, BP Method: Medium (Manual))   Pulse 66   Ht 5' 4" (1.626 m)   Wt 65 kg (143 lb 4.8 oz)   SpO2 99%   BMI 24.60 kg/m²     Wt Readings from Last 3 Encounters:   02/27/23 65 kg (143 lb 4.8 oz)   02/21/23 64.5 kg (142 lb 3.2 oz)   02/10/23 64.1 kg (141 lb 5 oz)     Temp Readings from Last 3 Encounters:   02/21/23 96.6 °F (35.9 °C) (Tympanic)   02/10/23 97.7 °F (36.5 °C)   12/31/22 98.1 °F (36.7 °C) (Oral)     BP Readings from Last 3 Encounters:   02/27/23 134/82   02/21/23 122/80   02/10/23 129/80     Pulse Readings from Last 3 Encounters:   02/27/23 66   02/21/23 82   02/10/23 66       Objective:    Physical Exam  Vitals and nursing note reviewed.   Constitutional:       General: She is not in acute distress.     Appearance: Normal appearance. She is well-developed. She is not ill-appearing or diaphoretic.   HENT:      Head: Normocephalic.   Neck:      Thyroid: No thyromegaly.      Vascular: Normal carotid pulses. No carotid bruit, " hepatojugular reflux or JVD.   Cardiovascular:      Rate and Rhythm: Normal rate and regular rhythm.      Chest Wall: PMI is not displaced.      Pulses: Normal pulses.           Radial pulses are 2+ on the right side and 2+ on the left side.      Heart sounds: Normal heart sounds, S1 normal and S2 normal. No murmur heard.    No friction rub. No gallop.   Pulmonary:      Effort: Pulmonary effort is normal.      Breath sounds: Normal breath sounds. No wheezing or rales.   Abdominal:      General: Bowel sounds are normal. There is no abdominal bruit.      Palpations: Abdomen is soft. There is no hepatomegaly, splenomegaly or mass.      Tenderness: There is no abdominal tenderness.   Musculoskeletal:      Cervical back: Neck supple.   Lymphadenopathy:      Cervical: No cervical adenopathy.   Skin:     General: Skin is warm.   Neurological:      Mental Status: She is alert and oriented to person, place, and time.   Psychiatric:         Behavior: Behavior normal. Behavior is cooperative.     I have reviewed all pertinent labs and cardiac studies.      Chemistry        Component Value Date/Time     10/21/2022 0743    K 4.3 10/21/2022 0743     10/21/2022 0743    CO2 26 10/21/2022 0743    BUN 17 10/21/2022 0743    CREATININE 0.7 10/21/2022 0743    GLU 95 10/21/2022 0743        Component Value Date/Time    CALCIUM 9.3 10/21/2022 0743    ALKPHOS 46 (L) 10/21/2022 0743    AST 10 10/21/2022 0743    ALT 8 (L) 10/21/2022 0743    BILITOT 0.4 10/21/2022 0743    ESTGFRAFRICA >60 10/26/2021 0801    EGFRNONAA >60 10/26/2021 0801        Lab Results   Component Value Date    WBC 7.44 10/21/2022    HGB 14.0 10/21/2022    HCT 40.5 10/21/2022    MCV 88 10/21/2022     10/21/2022       Lab Results   Component Value Date    TSH 0.211 (L) 01/26/2023     Lab Results   Component Value Date    HGBA1C 4.9 10/21/2022     Lab Results   Component Value Date    CHOL 193 10/21/2022    CHOL 174 08/09/2022    CHOL 185 10/26/2021     Lab  Results   Component Value Date    HDL 45 10/21/2022    HDL 44 08/09/2022    HDL 42 10/26/2021     Lab Results   Component Value Date    LDLCALC 124.4 10/21/2022    LDLCALC 110.0 08/09/2022    LDLCALC 109.6 10/26/2021     Lab Results   Component Value Date    TRIG 118 10/21/2022    TRIG 100 08/09/2022    TRIG 167 (H) 10/26/2021     Lab Results   Component Value Date    CHOLHDL 23.3 10/21/2022    CHOLHDL 25.3 08/09/2022    CHOLHDL 22.7 10/26/2021           Assessment:       1. Palpitations    2. SOLOMON (dyspnea on exertion)    3. Abnormal ECG    4. Nonrheumatic mitral valve regurgitation    5. PSVT (paroxysmal supraventricular tachycardia)    6. Labile blood pressure    7. Dyspnea on exertion    8. Other fatigue    9. AP (angina pectoris)         Plan:             Ischemia evaluation advised.  Stress MPI.  Echocardiogram to assess LV function and degree of MR (noted on past echo).  Discussed possible LHC if stress test/echo + for ischemia/CHF.  Risks/benefits discussed.  2 week Vital Holter.  Take Propranolol 1/2 - 1 pill on prn basis for symptomatic episodes (currently not taking it).  Goal BP < 130/80.  Home BP monitoring.  CXR pa/lateral.  Low salt diet.    F/u asap after tests to review and make further tx decisions accordingly.      I have reviewed all pertinent labs and cardiac studies independently. Plans and recommendations have been formulated under my direct supervision. All questions answered and patient voiced understanding.

## 2023-03-09 ENCOUNTER — OFFICE VISIT (OUTPATIENT)
Dept: URGENT CARE | Facility: CLINIC | Age: 68
End: 2023-03-09
Payer: MEDICARE

## 2023-03-09 VITALS
HEART RATE: 93 BPM | HEIGHT: 64 IN | RESPIRATION RATE: 17 BRPM | OXYGEN SATURATION: 99 % | BODY MASS INDEX: 24.24 KG/M2 | TEMPERATURE: 99 F | SYSTOLIC BLOOD PRESSURE: 121 MMHG | DIASTOLIC BLOOD PRESSURE: 79 MMHG | WEIGHT: 142 LBS

## 2023-03-09 DIAGNOSIS — J32.9 BACTERIAL SINUSITIS: Primary | ICD-10-CM

## 2023-03-09 DIAGNOSIS — B96.89 BACTERIAL SINUSITIS: Primary | ICD-10-CM

## 2023-03-09 PROCEDURE — 99203 OFFICE O/P NEW LOW 30 MIN: CPT | Mod: S$GLB,,, | Performed by: PHYSICIAN ASSISTANT

## 2023-03-09 PROCEDURE — 99203 PR OFFICE/OUTPT VISIT, NEW, LEVL III, 30-44 MIN: ICD-10-PCS | Mod: S$GLB,,, | Performed by: PHYSICIAN ASSISTANT

## 2023-03-09 RX ORDER — PREDNISONE 20 MG/1
20 TABLET ORAL DAILY
Qty: 2 TABLET | Refills: 0 | Status: SHIPPED | OUTPATIENT
Start: 2023-03-09 | End: 2023-03-11

## 2023-03-09 RX ORDER — AMOXICILLIN 500 MG/1
500 TABLET, FILM COATED ORAL EVERY 12 HOURS
Qty: 20 TABLET | Refills: 0 | Status: SHIPPED | OUTPATIENT
Start: 2023-03-09 | End: 2023-03-19

## 2023-03-09 NOTE — PROGRESS NOTES
"Subjective:       Patient ID: Mone Best is a 67 y.o. female.    Vitals:  height is 5' 4" (1.626 m) and weight is 64.4 kg (142 lb). Her temperature is 98.6 °F (37 °C). Her blood pressure is 121/79 and her pulse is 93. Her respiration is 17 and oxygen saturation is 99%.     Chief Complaint: Sinus Problem    Patient is a 67-year-old female who presents with a 2 week history of nasal congestion, facial pain, cough, fatigue, body aches.  Patient states the facial pressure is behind her eyes.  Patient also complains of chest congestion and productive cough mostly in the mornings.  Patient also has a dry/scratchy throat which has mostly resolved.  Patient has been taking some over-the-counter medications such as Zyrtec and Tylenol without relief.  Patient denies any nausea, vomiting, diarrhea, chest pain, shortness a breath, fever, chills.  Patient was exposed to her grandson approximately 2 weeks ago with similar symptoms though his have resolved.        Sinus Problem  This is a new problem. The current episode started 1 to 4 weeks ago. The problem is unchanged. There has been no fever. Her pain is at a severity of 0/10. She is experiencing no pain. Associated symptoms include congestion, coughing, headaches, a hoarse voice and sinus pressure. Pertinent negatives include no chills, diaphoresis, ear pain, neck pain, shortness of breath, sneezing, sore throat or swollen glands. Past treatments include acetaminophen and oral decongestants.     Constitution: Positive for fatigue. Negative for chills, sweating and fever.   HENT:  Positive for congestion, postnasal drip and sinus pressure. Negative for ear pain, sinus pain, sore throat and trouble swallowing.    Neck: Negative for neck pain and painful lymph nodes.   Cardiovascular:  Negative for chest pain.   Respiratory:  Positive for cough and sputum production. Negative for shortness of breath and wheezing.    Gastrointestinal:  Negative for abdominal pain, nausea, " vomiting and diarrhea.   Musculoskeletal:  Positive for muscle ache.   Allergic/Immunologic: Negative for sneezing.   Neurological:  Positive for headaches.   Hematologic/Lymphatic: Negative for swollen lymph nodes.     Objective:      Physical Exam   Constitutional: She is oriented to person, place, and time. She appears well-developed. She is cooperative.  Non-toxic appearance. She does not appear ill. No distress.   HENT:   Head: Normocephalic and atraumatic.   Ears:   Right Ear: Hearing, tympanic membrane, external ear and ear canal normal.   Left Ear: Hearing, tympanic membrane, external ear and ear canal normal.   Nose: Congestion present. No mucosal edema, rhinorrhea or nasal deformity. No epistaxis. Right sinus exhibits frontal sinus tenderness. Right sinus exhibits no maxillary sinus tenderness. Left sinus exhibits frontal sinus tenderness. Left sinus exhibits no maxillary sinus tenderness.   Mouth/Throat: Uvula is midline and mucous membranes are normal. No trismus in the jaw. Normal dentition. No uvula swelling. Posterior oropharyngeal erythema present. No oropharyngeal exudate or posterior oropharyngeal edema.   Eyes: Conjunctivae and lids are normal. No scleral icterus.   Neck: Trachea normal and phonation normal. Neck supple. No edema present. No erythema present. No neck rigidity present.   Cardiovascular: Normal rate, regular rhythm, normal heart sounds and normal pulses.   No murmur heard.  Pulmonary/Chest: Effort normal and breath sounds normal. No respiratory distress. She has no decreased breath sounds. She has no wheezes. She has no rhonchi.   Abdominal: Normal appearance.   Musculoskeletal: Normal range of motion.         General: No deformity. Normal range of motion.   Lymphadenopathy:     She has cervical adenopathy.   Neurological: She is alert and oriented to person, place, and time. She exhibits normal muscle tone. Coordination normal.   Skin: Skin is warm, dry, intact, not diaphoretic and  not pale.   Psychiatric: Her speech is normal and behavior is normal. Judgment and thought content normal.   Nursing note and vitals reviewed.        Assessment:       1. Bacterial sinusitis          Plan:         Bacterial sinusitis  -     amoxicillin (AMOXIL) 500 MG Tab; Take 1 tablet (500 mg total) by mouth every 12 (twelve) hours. for 10 days  Dispense: 20 tablet; Refill: 0  -     predniSONE (DELTASONE) 20 MG tablet; Take 1 tablet (20 mg total) by mouth once daily. for 2 doses  Dispense: 2 tablet; Refill: 0    Discussed length and severity of some dullness we can treat for bacterial sinusitis at this time.  Amoxicillin sent to patient's preferred pharmacy to be taken twice daily for 10 days.  Discussed 1 time oral dose of prednisone to help with inflammation.  Discussed continued use of Tylenol and Zyrtec will help to alleviate some symptoms.  Patient verbalized understanding and agrees with plan.  Return to clinic if symptoms worsen, change, or persist.    Seema Kimble PA-C    Patient Instructions   PLEASE READ YOUR DISCHARGE INSTRUCTIONS ENTIRELY AS IT CONTAINS IMPORTANT INFORMATION.      Please drink plenty of fluids.    Please get plenty of rest.    Please return here or go to the Emergency Department for any concerns or worsening of condition.    Please take an over the counter antihistamine medication (allegra/Claritin/Zyrtec) of your choice as directed.    Try an over the counter decongestant like Mucinex D or Sudafed. You buy this behind the pharmacy counter    If you do have Hypertension or palpitations, it is safe to take Coricidin HBP for relief of sinus symptoms.    If not allergic, please take over the counter Tylenol (Acetaminophen) and/or Motrin (Ibuprofen) as directed for control of pain and/or fever.  Please follow up with your primary care doctor or specialist as needed.    Sore throat recommendations: Warm fluids, warm salt water gargles, throat lozenges, tea, honey, soup, rest,  hydration.    Use over the counter flonase: one spray each nostril twice daily OR two sprays each nostril once daily.     Sinus rinses DO NOT USE TAP WATER, if you must, water must be a rolling boil for 1 minute, let it cool, then use.  May use distilled water, or over the counter nasal saline rinses.  Vics vapor rub in shower to help open nasal passages.  May use nasal gel to keep passages moisturized.  May use Nasal saline sprays during the day for added relief of congestion.   For those who go to the gym, please do not use the sauna or steam room now to clear sinuses.    If you  smoke, please stop smoking.      Please return or see your primary care doctor if you develop new or worsening symptoms.     Please arrange follow up with your primary medical clinic as soon as possible. You must understand that you've received an Urgent Care treatment only and that you may be released before all of your medical problems are known or treated. You, the patient, will arrange for follow up as instructed. If your symptoms worsen or fail to improve you should go to the Emergency Room.

## 2023-03-12 ENCOUNTER — TELEPHONE (OUTPATIENT)
Dept: URGENT CARE | Facility: CLINIC | Age: 68
End: 2023-03-12
Payer: MEDICARE

## 2023-03-21 ENCOUNTER — OFFICE VISIT (OUTPATIENT)
Dept: URGENT CARE | Facility: CLINIC | Age: 68
End: 2023-03-21
Payer: MEDICARE

## 2023-03-21 ENCOUNTER — TELEPHONE (OUTPATIENT)
Dept: INTERNAL MEDICINE | Facility: CLINIC | Age: 68
End: 2023-03-21
Payer: MEDICARE

## 2023-03-21 ENCOUNTER — OFFICE VISIT (OUTPATIENT)
Dept: INTERNAL MEDICINE | Facility: CLINIC | Age: 68
End: 2023-03-21
Payer: MEDICARE

## 2023-03-21 VITALS
TEMPERATURE: 100 F | HEART RATE: 115 BPM | OXYGEN SATURATION: 98 % | BODY MASS INDEX: 24.59 KG/M2 | HEIGHT: 64 IN | DIASTOLIC BLOOD PRESSURE: 85 MMHG | RESPIRATION RATE: 18 BRPM | WEIGHT: 144 LBS | SYSTOLIC BLOOD PRESSURE: 167 MMHG

## 2023-03-21 DIAGNOSIS — J06.9 VIRAL URI: ICD-10-CM

## 2023-03-21 DIAGNOSIS — R05.9 COUGH, UNSPECIFIED TYPE: Primary | ICD-10-CM

## 2023-03-21 DIAGNOSIS — Z20.822 SUSPECTED COVID-19 VIRUS INFECTION: ICD-10-CM

## 2023-03-21 DIAGNOSIS — J06.9 URI WITH COUGH AND CONGESTION: Primary | ICD-10-CM

## 2023-03-21 LAB
CTP QC/QA: YES
SARS-COV-2 AG RESP QL IA.RAPID: NEGATIVE

## 2023-03-21 PROCEDURE — 1160F RVW MEDS BY RX/DR IN RCRD: CPT | Mod: CPTII,95,, | Performed by: FAMILY MEDICINE

## 2023-03-21 PROCEDURE — 99212 PR OFFICE/OUTPT VISIT, EST, LEVL II, 10-19 MIN: ICD-10-PCS | Mod: 95,,, | Performed by: FAMILY MEDICINE

## 2023-03-21 PROCEDURE — 87811 SARS CORONAVIRUS 2 ANTIGEN POCT, MANUAL READ: ICD-10-PCS | Mod: QW,S$GLB,, | Performed by: NURSE PRACTITIONER

## 2023-03-21 PROCEDURE — 99212 OFFICE O/P EST SF 10 MIN: CPT | Mod: 95,,, | Performed by: FAMILY MEDICINE

## 2023-03-21 PROCEDURE — 87811 SARS-COV-2 COVID19 W/OPTIC: CPT | Mod: QW,S$GLB,, | Performed by: NURSE PRACTITIONER

## 2023-03-21 PROCEDURE — 1159F MED LIST DOCD IN RCRD: CPT | Mod: CPTII,95,, | Performed by: FAMILY MEDICINE

## 2023-03-21 PROCEDURE — 1159F PR MEDICATION LIST DOCUMENTED IN MEDICAL RECORD: ICD-10-PCS | Mod: CPTII,95,, | Performed by: FAMILY MEDICINE

## 2023-03-21 PROCEDURE — 1160F PR REVIEW ALL MEDS BY PRESCRIBER/CLIN PHARMACIST DOCUMENTED: ICD-10-PCS | Mod: CPTII,95,, | Performed by: FAMILY MEDICINE

## 2023-03-21 PROCEDURE — 99213 OFFICE O/P EST LOW 20 MIN: CPT | Mod: S$GLB,,, | Performed by: NURSE PRACTITIONER

## 2023-03-21 PROCEDURE — 99213 PR OFFICE/OUTPT VISIT, EST, LEVL III, 20-29 MIN: ICD-10-PCS | Mod: S$GLB,,, | Performed by: NURSE PRACTITIONER

## 2023-03-21 NOTE — PATIENT INSTRUCTIONS

## 2023-03-21 NOTE — PROGRESS NOTES
"Subjective:       Patient ID: Mone Best is a 67 y.o. female.    Vitals:  height is 5' 4" (1.626 m) and weight is 65.3 kg (144 lb). Her oral temperature is 99.9 °F (37.7 °C). Her blood pressure is 167/85 (abnormal) and her pulse is 115 (abnormal). Her respiration is 18 and oxygen saturation is 98%.     Chief Complaint: Cough    67 yr old female presents to Urgent Care with complaint of cough, sore throat and subjective fever x 3 days. Patient's PCP recommended covid testing via virtual visit. Patient has been seen in Urgent Care several times for same symptoms since December. On 03/09/23, patient was dx with sinusitis and prescribed Amoxicillin and Prednisone. Patient just completed abx course last night and no relief with symptoms. Patient denies any CP, SOB, abdominal pain at this time.     Cough  This is a new problem. The current episode started in the past 7 days. The problem has been gradually worsening. The problem occurs constantly. The cough is Productive of sputum. Associated symptoms include chills, ear congestion, a fever, headaches, myalgias, nasal congestion, postnasal drip, rhinorrhea and a sore throat. Pertinent negatives include no chest pain, ear pain, heartburn, hemoptysis, rash, shortness of breath, sweats, weight loss or wheezing. Nothing aggravates the symptoms. Treatments tried: dayquil, nyquil, cough drops. The treatment provided mild relief. There is no history of asthma, bronchiectasis, bronchitis, COPD, emphysema, environmental allergies or pneumonia.     Constitution: Positive for chills and fever. Negative for sweating and fatigue.   HENT:  Positive for congestion, postnasal drip and sore throat. Negative for ear pain, sinus pain, sinus pressure, trouble swallowing and voice change.    Cardiovascular:  Negative for chest pain and sob on exertion.   Respiratory:  Positive for cough. Negative for sputum production, bloody sputum, shortness of breath and wheezing.  "   Gastrointestinal:  Negative for heartburn.   Musculoskeletal:  Positive for muscle ache.   Skin:  Negative for rash.   Allergic/Immunologic: Negative for environmental allergies.   Neurological:  Positive for headaches.     Objective:      Physical Exam   Constitutional: She is oriented to person, place, and time. She appears well-developed. She is cooperative.  Non-toxic appearance. She does not appear ill. No distress.   HENT:   Head: Normocephalic and atraumatic.   Ears:   Right Ear: Hearing, tympanic membrane, external ear and ear canal normal.   Left Ear: Hearing, tympanic membrane, external ear and ear canal normal.   Nose: Nose normal. No mucosal edema, rhinorrhea or nasal deformity. No epistaxis. Right sinus exhibits no maxillary sinus tenderness and no frontal sinus tenderness. Left sinus exhibits no maxillary sinus tenderness and no frontal sinus tenderness.   Mouth/Throat: Uvula is midline, oropharynx is clear and moist and mucous membranes are normal. No trismus in the jaw. Normal dentition. No uvula swelling. No oropharyngeal exudate, posterior oropharyngeal edema or posterior oropharyngeal erythema. Tonsils are 2+ on the right. Tonsils are 2+ on the left. No tonsillar exudate.   Eyes: Conjunctivae, EOM and lids are normal. Pupils are equal, round, and reactive to light. No scleral icterus.   Neck: Trachea normal and phonation normal. Neck supple. No edema present. No erythema present. No neck rigidity present.   Cardiovascular: Normal rate, regular rhythm and normal heart sounds.   Pulmonary/Chest: Effort normal and breath sounds normal. No accessory muscle usage or stridor. No respiratory distress. She has no decreased breath sounds. She has no wheezes. She has no rhonchi. She has no rales.   Musculoskeletal: Normal range of motion.         General: No deformity. Normal range of motion.   Lymphadenopathy:        Head (right side): No tonsillar and no preauricular adenopathy present.        Head  (left side): No tonsillar and no preauricular adenopathy present.     She has no cervical adenopathy.   Neurological: She is alert and oriented to person, place, and time. She exhibits normal muscle tone. Coordination and gait normal.   Skin: Skin is warm, dry, intact, not diaphoretic and not pale. Capillary refill takes less than 2 seconds.   Psychiatric: Her speech is normal and behavior is normal. Judgment and thought content normal.   Nursing note and vitals reviewed.      Assessment:       1. Cough, unspecified type    2. Viral URI          Plan:       67 yr old otherwise healthy patient presenting with constellation of symptoms likely representing uncomplicated viral upper respiratory symptoms.    Also considered but less likely:  PTA/RPA: no hot potato voice, no uvular deviation,  Esophageal rupture: No history of dysphagia  Unlikely deep space infection/Primo's  Covid Negative  Low suspicion for CNS infection bacterial sinusitis, or pneumonia given exam and history.  Unlikely Strep or EBV as centor negative and with no pharyngeal exudate, posterior LAD, or splenomegaly.  Will attempt to alleviate symptoms conservatively; no overt indications at this time for antibiotics. No respiratory distress, otherwise relatively well appearing and nontoxic. Return precautions given, patient understands and agrees with plan. All questions answered.  Instructed to follow up with PCP.    Cough, unspecified type  -     SARS Coronavirus 2 Antigen, POCT Manual Read    Viral URI      Patient Instructions   If you were prescribed a narcotic or controlled medication, do not drive or operate heavy equipment or machinery while taking these medications.  You must understand that you've received an Urgent Care treatment only and that you may be released before all your medical problems are known or treated. You, the patient, will arrange for follow up care as instructed.  Follow up with your PCP or specialty clinic as directed  within 2-5 days if not improved or as needed.  You can call [phone] to schedule an appointment with the appropriate provider.  If your condition worsens we recommend that you receive another evaluation at the emergency room immediately or contact your primary medical clinics after hours call service to discuss your concerns.  Please return here or go to the Emergency Department for any concerns or worsening of condition.

## 2023-03-21 NOTE — PROGRESS NOTES
"TELEMEDICINE VIRTUAL VISIT  3/21/23  4:00 PM CDT    Visit Details: This visit was a telemedicine virtual visit with synchronous audio and video. Mone represented that her location at the time of this visit was in the [facility]. Mone chose and consented to receive these medical services by telemedicine.    Subjective   CHIEF COMPLAINT: Cough    On 3/9/23 she went to urgent care and was diagnosed with bacterial sinusitis and was treated with 10 day course of amoxicillin plus prednisone. She says that she completed the antibiotics and prednisone as directed.  On Sunday, she developed new symptoms of low-grade fever, fatigue and malaise, cough productive of clear green sputum, head and chest congestion, and sensation that she "couldn't get a good breath." She has not been tested for COVID.  We discussed differential diagnosis and risks and benefits of treatment options.  She does not have home COVID test available. She agreed to go to urgent care for COVID test appropriate evaluation.    Sore Throat   This is a recurrent problem. The current episode started yesterday. Neither side of throat is experiencing more pain than the other. The maximum temperature recorded prior to her arrival was 100 - 100.9 F. The fever has been present for 1 to 2 days. The pain is at a severity of 8/10. The pain is moderate. Associated symptoms include congestion, coughing, diarrhea, headaches, a hoarse voice, a plugged ear sensation, shortness of breath, swollen glands and trouble swallowing. Pertinent negatives include no abdominal pain, drooling, ear discharge, ear pain, neck pain, stridor or vomiting. She has had no exposure to strep or mono. She has tried NSAIDs, acetaminophen and cool liquids for the symptoms. The treatment provided mild relief.   Review of Systems   Constitutional:  Positive for fatigue and fever (low grade).   HENT:  Positive for nasal congestion, hoarse voice, sore throat and trouble swallowing. Negative " for drooling, ear discharge and ear pain.    Respiratory:  Positive for cough, chest tightness and shortness of breath. Negative for stridor.    Cardiovascular:  Negative for chest pain.   Gastrointestinal:  Positive for diarrhea. Negative for abdominal pain and vomiting.   Musculoskeletal:  Negative for neck pain.   Neurological:  Positive for headaches.      Assessment & Plan   1. URI with cough and congestion    2. Suspected COVID-19 virus infection    Unless noted herein, any chronic conditions are represented as and appear compensated/controlled and stable, and no other significant complaints or concerns were reported.    Follow up if symptoms worsen or fail to improve.     Objective   Physical Exam  Constitutional:       General: She is not in acute distress.     Appearance: She is ill-appearing. She is not toxic-appearing or diaphoretic.   Pulmonary:      Effort: Pulmonary effort is normal. No respiratory distress.   Skin:     Coloration: Skin is not jaundiced.   Neurological:      Mental Status: She is alert. Mental status is at baseline.   Psychiatric:         Mood and Affect: Mood normal.         Behavior: Behavior normal.        TOTAL TIME evaluating and managing this patient for this encounter was greater than or equal to 15 minutes. This time was spent personally by me on the following activities: review of patient's past medical history, assessing age-appropriate health maintenance needs, review of any interval history, medication reconciliation, obtaining history from the patient, examination of the patient, discussing differential diagnosis with patient, educating patient and answering their questions about risks and benefits of treatment options, educating patient and answering their questions about treatment plan, goals of treatment, and follow-up, and final documentation of the visit. This time was exclusive of any separately billable procedures for this patient and exclusive of time spent  "treating any other patients.    Documentation entered by me for this encounter may have been done in part using speech-recognition technology. Although I have made an effort to ensure accuracy, "sound like" errors may exist and should be interpreted in context.    Each patient to whom medical services are provided by telemedicine is: (1) informed of the relationship between the physician and patient and the respective role of any other health care provider with respect to management of the patient; and (2) notified that he or she may decline to receive medical services by telemedicine and may withdraw from such care at any time.  "

## 2023-03-22 ENCOUNTER — TELEPHONE (OUTPATIENT)
Dept: URGENT CARE | Facility: CLINIC | Age: 68
End: 2023-03-22
Payer: MEDICARE

## 2023-03-22 DIAGNOSIS — J06.9 VIRAL URI: ICD-10-CM

## 2023-03-22 DIAGNOSIS — R05.9 COUGH, UNSPECIFIED TYPE: Primary | ICD-10-CM

## 2023-03-22 RX ORDER — PROMETHAZINE HYDROCHLORIDE AND DEXTROMETHORPHAN HYDROBROMIDE 6.25; 15 MG/5ML; MG/5ML
5 SYRUP ORAL NIGHTLY PRN
Qty: 118 ML | Refills: 0 | Status: SHIPPED | OUTPATIENT
Start: 2023-03-22 | End: 2023-05-10

## 2023-03-22 RX ORDER — ALBUTEROL SULFATE 90 UG/1
2 AEROSOL, METERED RESPIRATORY (INHALATION) EVERY 6 HOURS PRN
Qty: 8 G | Refills: 0 | Status: SHIPPED | OUTPATIENT
Start: 2023-03-22 | End: 2023-04-13

## 2023-03-22 RX ORDER — BENZONATATE 100 MG/1
100 CAPSULE ORAL 3 TIMES DAILY PRN
Qty: 30 CAPSULE | Refills: 0 | Status: SHIPPED | OUTPATIENT
Start: 2023-03-22 | End: 2023-08-29

## 2023-03-22 NOTE — PROGRESS NOTES
Received message that pt had discussion about getting cough medications and inhaler during her visit yesterday but no meds were sent to pharmacy.

## 2023-03-22 NOTE — TELEPHONE ENCOUNTER
Patient called asking if there were any medications called into the pharmacy for her. States that provider told her she would call come cough syrup, tessalon perles, and inhaler into the [facility] on [address] but the order was not visible on her AVS. Informed patient that I would give the provider the message.

## 2023-03-24 ENCOUNTER — TELEPHONE (OUTPATIENT)
Dept: URGENT CARE | Facility: CLINIC | Age: 68
End: 2023-03-24
Payer: MEDICARE

## 2023-03-24 NOTE — TELEPHONE ENCOUNTER
Called pt to see how she was doing. She stated that she is still congested but doing a little bit better. Advised her that if symptoms get worse of no significant improvement in the next several days to f/u with her PCP or come back to UC. She verbalized understanding.

## 2023-03-30 ENCOUNTER — HOSPITAL ENCOUNTER (OUTPATIENT)
Dept: RADIOLOGY | Facility: HOSPITAL | Age: 68
Discharge: HOME | End: 2023-03-30
Attending: INTERNAL MEDICINE
Payer: MEDICARE

## 2023-03-30 ENCOUNTER — HOSPITAL ENCOUNTER (OUTPATIENT)
Dept: CARDIOLOGY | Facility: HOSPITAL | Age: 68
Discharge: HOME | End: 2023-03-30
Attending: INTERNAL MEDICINE
Payer: MEDICARE

## 2023-03-30 VITALS
WEIGHT: 144 LBS | DIASTOLIC BLOOD PRESSURE: 85 MMHG | HEIGHT: 64 IN | BODY MASS INDEX: 24.59 KG/M2 | SYSTOLIC BLOOD PRESSURE: 167 MMHG

## 2023-03-30 DIAGNOSIS — R06.09 DYSPNEA ON EXERTION: ICD-10-CM

## 2023-03-30 DIAGNOSIS — I34.0 NONRHEUMATIC MITRAL VALVE REGURGITATION: ICD-10-CM

## 2023-03-30 DIAGNOSIS — I47.10 PSVT (PAROXYSMAL SUPRAVENTRICULAR TACHYCARDIA): ICD-10-CM

## 2023-03-30 DIAGNOSIS — R09.89 LABILE BLOOD PRESSURE: ICD-10-CM

## 2023-03-30 DIAGNOSIS — I20.9 AP (ANGINA PECTORIS): ICD-10-CM

## 2023-03-30 DIAGNOSIS — R00.2 PALPITATIONS: ICD-10-CM

## 2023-03-30 DIAGNOSIS — R06.09 DOE (DYSPNEA ON EXERTION): ICD-10-CM

## 2023-03-30 DIAGNOSIS — R94.31 ABNORMAL ECG: ICD-10-CM

## 2023-03-30 DIAGNOSIS — I47.10 PSVT (PAROXYSMAL SUPRAVENTRICULAR TACHYCARDIA): Chronic | ICD-10-CM

## 2023-03-30 DIAGNOSIS — R53.83 OTHER FATIGUE: ICD-10-CM

## 2023-03-30 LAB
AORTIC ROOT ANNULUS: 2.83 CM
ASCENDING AORTA: 2.61 CM
AV INDEX (PROSTH): 0.83
AV MEAN GRADIENT: 4 MMHG
AV PEAK GRADIENT: 6 MMHG
AV VALVE AREA: 2.51 CM2
AV VELOCITY RATIO: 0.9
BSA FOR ECHO PROCEDURE: 1.72 M2
CV ECHO LV RWT: 0.53 CM
CV STRESS BASE HR: 75 BPM
DIASTOLIC BLOOD PRESSURE: 92 MMHG
DOP CALC AO PEAK VEL: 1.24 M/S
DOP CALC AO VTI: 29 CM
DOP CALC LVOT AREA: 3 CM2
DOP CALC LVOT DIAMETER: 1.96 CM
DOP CALC LVOT PEAK VEL: 1.12 M/S
DOP CALC LVOT STROKE VOLUME: 72.68 CM3
DOP CALC RVOT PEAK VEL: 0.63 M/S
DOP CALC RVOT VTI: 14.6 CM
DOP CALCLVOT PEAK VEL VTI: 24.1 CM
E WAVE DECELERATION TIME: 284.46 MSEC
E/A RATIO: 0.82
E/E' RATIO: 8.94 M/S
ECHO LV POSTERIOR WALL: 0.92 CM (ref 0.6–1.1)
EJECTION FRACTION: 60 %
FRACTIONAL SHORTENING: 33 % (ref 28–44)
INTERVENTRICULAR SEPTUM: 0.93 CM (ref 0.6–1.1)
IVC DIAMETER: 1.76 CM
IVRT: 122.74 MSEC
LA MAJOR: 4.05 CM
LA MINOR: 3.95 CM
LA WIDTH: 3.5 CM
LEFT ATRIUM SIZE: 3.12 CM
LEFT ATRIUM VOLUME INDEX MOD: 25.3 ML/M2
LEFT ATRIUM VOLUME INDEX: 21.8 ML/M2
LEFT ATRIUM VOLUME MOD: 43.03 CM3
LEFT ATRIUM VOLUME: 37.12 CM3
LEFT INTERNAL DIMENSION IN SYSTOLE: 2.33 CM (ref 2.1–4)
LEFT VENTRICLE DIASTOLIC VOLUME INDEX: 29.77 ML/M2
LEFT VENTRICLE DIASTOLIC VOLUME: 50.61 ML
LEFT VENTRICLE MASS INDEX: 54 G/M2
LEFT VENTRICLE SYSTOLIC VOLUME INDEX: 11 ML/M2
LEFT VENTRICLE SYSTOLIC VOLUME: 18.76 ML
LEFT VENTRICULAR INTERNAL DIMENSION IN DIASTOLE: 3.49 CM (ref 3.5–6)
LEFT VENTRICULAR MASS: 91.92 G
LV LATERAL E/E' RATIO: 8.44 M/S
LV SEPTAL E/E' RATIO: 9.5 M/S
LVOT MG: 2.6 MMHG
LVOT MV: 0.74 CM/S
MV PEAK A VEL: 0.93 M/S
MV PEAK E VEL: 0.76 M/S
MV STENOSIS PRESSURE HALF TIME: 82.49 MS
MV VALVE AREA P 1/2 METHOD: 2.67 CM2
NUC REST EJECTION FRACTION: 75
NUC STRESS EJECTION FRACTION: 91 %
OHS CV CPX 1 MINUTE RECOVERY HEART RATE: 117 BPM
OHS CV CPX 85 PERCENT MAX PREDICTED HEART RATE MALE: 126
OHS CV CPX ESTIMATED METS: 7
OHS CV CPX MAX PREDICTED HEART RATE: 148
OHS CV CPX PATIENT IS FEMALE: 1
OHS CV CPX PATIENT IS MALE: 0
OHS CV CPX PEAK DIASTOLIC BLOOD PRESSURE: 108 MMHG
OHS CV CPX PEAK HEAR RATE: 130 BPM
OHS CV CPX PEAK RATE PRESSURE PRODUCT: NORMAL
OHS CV CPX PEAK SYSTOLIC BLOOD PRESSURE: 205 MMHG
OHS CV CPX PERCENT MAX PREDICTED HEART RATE ACHIEVED: 88
OHS CV CPX RATE PRESSURE PRODUCT PRESENTING: 9225
PISA TR MAX VEL: 2.52 M/S
PULM VEIN S/D RATIO: 1.21
PV MEAN GRADIENT: 0.81 MMHG
PV PEAK D VEL: 0.53 M/S
PV PEAK S VEL: 0.64 M/S
PV PEAK VELOCITY: 1.56 CM/S
RA MAJOR: 4.23 CM
RA PRESSURE: 3 MMHG
RA WIDTH: 3.32 CM
RIGHT VENTRICULAR END-DIASTOLIC DIMENSION: 2.33 CM
SINUS: 2.71 CM
STJ: 2.48 CM
STRESS ECHO POST EXERCISE DUR MIN: 6 MINUTES
STRESS ECHO POST EXERCISE DUR SEC: 30 SECONDS
SYSTOLIC BLOOD PRESSURE: 123 MMHG
TDI LATERAL: 0.09 M/S
TDI SEPTAL: 0.08 M/S
TDI: 0.09 M/S
TR MAX PG: 25 MMHG
TV REST PULMONARY ARTERY PRESSURE: 28 MMHG

## 2023-03-30 PROCEDURE — 93016 NUCLEAR STRESS - CARDIOLOGY INTERPRETED (CUPID ONLY): ICD-10-PCS | Mod: ,,, | Performed by: INTERNAL MEDICINE

## 2023-03-30 PROCEDURE — 93018 CV STRESS TEST I&R ONLY: CPT | Mod: ,,, | Performed by: INTERNAL MEDICINE

## 2023-03-30 PROCEDURE — 93306 TTE W/DOPPLER COMPLETE: CPT | Mod: 26,,, | Performed by: INTERNAL MEDICINE

## 2023-03-30 PROCEDURE — 93248 CV CARDIAC MONITOR - 3-15 DAY ADULT (CUPID ONLY): ICD-10-PCS | Mod: ,,, | Performed by: INTERNAL MEDICINE

## 2023-03-30 PROCEDURE — 93306 ECHO (CUPID ONLY): ICD-10-PCS | Mod: 26,,, | Performed by: INTERNAL MEDICINE

## 2023-03-30 PROCEDURE — 78452 NUCLEAR STRESS - CARDIOLOGY INTERPRETED (CUPID ONLY): ICD-10-PCS | Mod: 26,,, | Performed by: INTERNAL MEDICINE

## 2023-03-30 PROCEDURE — 93017 CV STRESS TEST TRACING ONLY: CPT

## 2023-03-30 PROCEDURE — 93016 CV STRESS TEST SUPVJ ONLY: CPT | Mod: ,,, | Performed by: INTERNAL MEDICINE

## 2023-03-30 PROCEDURE — 78452 HT MUSCLE IMAGE SPECT MULT: CPT | Mod: 26,,, | Performed by: INTERNAL MEDICINE

## 2023-03-30 PROCEDURE — 93306 TTE W/DOPPLER COMPLETE: CPT

## 2023-03-30 PROCEDURE — 78452 HT MUSCLE IMAGE SPECT MULT: CPT

## 2023-03-30 PROCEDURE — 93248 EXT ECG>7D<15D REV&INTERPJ: CPT | Mod: ,,, | Performed by: INTERNAL MEDICINE

## 2023-03-30 PROCEDURE — A9502 TC99M TETROFOSMIN: HCPCS

## 2023-03-30 PROCEDURE — 93018 NUCLEAR STRESS - CARDIOLOGY INTERPRETED (CUPID ONLY): ICD-10-PCS | Mod: ,,, | Performed by: INTERNAL MEDICINE

## 2023-03-31 ENCOUNTER — PATIENT MESSAGE (OUTPATIENT)
Dept: CARDIOLOGY | Facility: CLINIC | Age: 68
End: 2023-03-31
Payer: MEDICARE

## 2023-04-03 ENCOUNTER — PATIENT MESSAGE (OUTPATIENT)
Dept: INTERNAL MEDICINE | Facility: CLINIC | Age: 68
End: 2023-04-03
Payer: MEDICARE

## 2023-04-12 ENCOUNTER — PATIENT MESSAGE (OUTPATIENT)
Dept: INTERNAL MEDICINE | Facility: CLINIC | Age: 68
End: 2023-04-12
Payer: MEDICARE

## 2023-04-12 NOTE — TELEPHONE ENCOUNTER
TO MY TEAM:   Please contact Mone to relay message (below) or at least verify that she read and understood the same Patient Portal message I sent on [facility].  If she says that she is still ill from her respiratory infection and not getting better, offer her soonest available in-office appointment with me or Misa Kaur PA-C.  If she says that she is getting better and does not need further evaluation, then schedule her for nurse visit blood pressure check soon.  --------------------------------------------------------------------------------   Mone Hinton.    I hope this finds you doing well.    I'm writing to ask for an update for how you are doing after your respiratory infection we discussed over a virtual visit after you returned home from [place]. If you are aren't getting better, then this deserves further evaluation.    I'm also concerned about your blood pressure.    BP Readings from Last 2 Encounters:   03/30/23 (!) 167/85   03/21/23 (!) 167/85     I'm asking my staff to contact you to help coordinate any care that you may need.    Thanks for letting me care for you, and thanks for trusting [facility] with your healthcare needs.    All the best,    AFRICA Plummer MD

## 2023-04-12 NOTE — TELEPHONE ENCOUNTER
Pt declines f/u office visit. Pt declines nurse visit. 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 Informed the pt of providers instructions. Pt declines all assistance. Pt states she will reach out if needed.

## 2023-04-13 NOTE — TELEPHONE ENCOUNTER
Pt declines f/u office visit. Pt declines nurse visit. 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 Informed the pt of providers instructions. Pt declines any assistance. Pt states she will reach out if needed.

## 2023-04-13 NOTE — TELEPHONE ENCOUNTER
Her blood pressure is uncontrolled. Scheduled her for nurse visit blood pressure check soon.  BP Readings from Last 2 Encounters:   03/30/23 (!) 167/85   03/21/23 (!) 167/85

## 2023-04-14 ENCOUNTER — PATIENT MESSAGE (OUTPATIENT)
Dept: INTERNAL MEDICINE | Facility: CLINIC | Age: 68
End: 2023-04-14
Payer: MEDICARE

## 2023-04-14 ENCOUNTER — DOCUMENTATION ONLY (OUTPATIENT)
Dept: INTERNAL MEDICINE | Facility: CLINIC | Age: 68
End: 2023-04-14

## 2023-04-14 ENCOUNTER — TELEPHONE (OUTPATIENT)
Dept: INTERNAL MEDICINE | Facility: CLINIC | Age: 68
End: 2023-04-14
Payer: MEDICARE

## 2023-04-14 VITALS — SYSTOLIC BLOOD PRESSURE: 127 MMHG | DIASTOLIC BLOOD PRESSURE: 85 MMHG

## 2023-04-14 NOTE — TELEPHONE ENCOUNTER
Beverly Hinton.    Would you please enter this patient-reported BP?    Thank you for your help caring for our patients!    -LM

## 2023-04-25 LAB
OHS CV HOLTER SINUS AVERAGE HR: 87
OHS CV HOLTER SINUS MAX HR: 136
OHS CV HOLTER SINUS MIN HR: 57

## 2023-05-10 ENCOUNTER — OFFICE VISIT (OUTPATIENT)
Dept: UROLOGY | Facility: CLINIC | Age: 68
End: 2023-05-10
Payer: MEDICARE

## 2023-05-10 VITALS
WEIGHT: 138.69 LBS | HEART RATE: 82 BPM | BODY MASS INDEX: 23.68 KG/M2 | RESPIRATION RATE: 18 BRPM | SYSTOLIC BLOOD PRESSURE: 127 MMHG | HEIGHT: 64 IN | DIASTOLIC BLOOD PRESSURE: 82 MMHG

## 2023-05-10 DIAGNOSIS — N39.46 MIXED INCONTINENCE: ICD-10-CM

## 2023-05-10 DIAGNOSIS — N32.81 OAB (OVERACTIVE BLADDER): Primary | ICD-10-CM

## 2023-05-10 DIAGNOSIS — K59.09 CONSTIPATION, CHRONIC: ICD-10-CM

## 2023-05-10 PROCEDURE — 3074F SYST BP LT 130 MM HG: CPT | Mod: CPTII,S$GLB,, | Performed by: UROLOGY

## 2023-05-10 PROCEDURE — 99999 PR PBB SHADOW E&M-EST. PATIENT-LVL IV: ICD-10-PCS | Mod: PBBFAC,,, | Performed by: UROLOGY

## 2023-05-10 PROCEDURE — 99214 OFFICE O/P EST MOD 30 MIN: CPT | Mod: S$GLB,,, | Performed by: UROLOGY

## 2023-05-10 PROCEDURE — 3288F FALL RISK ASSESSMENT DOCD: CPT | Mod: CPTII,S$GLB,, | Performed by: UROLOGY

## 2023-05-10 PROCEDURE — 1126F PR PAIN SEVERITY QUANTIFIED, NO PAIN PRESENT: ICD-10-PCS | Mod: CPTII,S$GLB,, | Performed by: UROLOGY

## 2023-05-10 PROCEDURE — 3008F PR BODY MASS INDEX (BMI) DOCUMENTED: ICD-10-PCS | Mod: CPTII,S$GLB,, | Performed by: UROLOGY

## 2023-05-10 PROCEDURE — 99999 PR PBB SHADOW E&M-EST. PATIENT-LVL IV: CPT | Mod: PBBFAC,,, | Performed by: UROLOGY

## 2023-05-10 PROCEDURE — 3079F DIAST BP 80-89 MM HG: CPT | Mod: CPTII,S$GLB,, | Performed by: UROLOGY

## 2023-05-10 PROCEDURE — 1101F PR PT FALLS ASSESS DOC 0-1 FALLS W/OUT INJ PAST YR: ICD-10-PCS | Mod: CPTII,S$GLB,, | Performed by: UROLOGY

## 2023-05-10 PROCEDURE — 1159F PR MEDICATION LIST DOCUMENTED IN MEDICAL RECORD: ICD-10-PCS | Mod: CPTII,S$GLB,, | Performed by: UROLOGY

## 2023-05-10 PROCEDURE — 3288F PR FALLS RISK ASSESSMENT DOCUMENTED: ICD-10-PCS | Mod: CPTII,S$GLB,, | Performed by: UROLOGY

## 2023-05-10 PROCEDURE — 1101F PT FALLS ASSESS-DOCD LE1/YR: CPT | Mod: CPTII,S$GLB,, | Performed by: UROLOGY

## 2023-05-10 PROCEDURE — 3074F PR MOST RECENT SYSTOLIC BLOOD PRESSURE < 130 MM HG: ICD-10-PCS | Mod: CPTII,S$GLB,, | Performed by: UROLOGY

## 2023-05-10 PROCEDURE — 3079F PR MOST RECENT DIASTOLIC BLOOD PRESSURE 80-89 MM HG: ICD-10-PCS | Mod: CPTII,S$GLB,, | Performed by: UROLOGY

## 2023-05-10 PROCEDURE — 1126F AMNT PAIN NOTED NONE PRSNT: CPT | Mod: CPTII,S$GLB,, | Performed by: UROLOGY

## 2023-05-10 PROCEDURE — 1159F MED LIST DOCD IN RCRD: CPT | Mod: CPTII,S$GLB,, | Performed by: UROLOGY

## 2023-05-10 PROCEDURE — 3008F BODY MASS INDEX DOCD: CPT | Mod: CPTII,S$GLB,, | Performed by: UROLOGY

## 2023-05-10 PROCEDURE — 99214 PR OFFICE/OUTPT VISIT, EST, LEVL IV, 30-39 MIN: ICD-10-PCS | Mod: S$GLB,,, | Performed by: UROLOGY

## 2023-05-10 NOTE — PROGRESS NOTES
CC: urinary incontinence        History of Present Illness:     5/10/23-Taking detrol LA 4mg prn. Not having as much urgency or incontinence when she takes it. Still wearing pads. 3 pads, not always wet. Has dry mouth when she takes detrol and stream isn't as strong.   2/10/23- myrbetriq raised her blood pressure, so she stopped it. Took it for a couple of weeks and noticed she had less frequency/urgency, but still having ALICE and needing to wear pads. Symptoms are worse when she has caffeine or a lot of water.   11/11/22- 66yo female here for evaluation of urinary incontinence. She has a prior h/o A+P repair (no mesh). Also had a bladder lift during hysterectomy.   She reports urge incontinence. Wears poise pads, size 4. Sometimes she pushes with the toilet paper to fully empty her bladder. She splints to have a BM. Occasional PRABHJOT. UUI>PRABHJOT. Hasn't been on any bladder meds. No UTIs, dysuria or gross hematuria. No feelings of incomplete emptying. Takes MiraLax daily for constipation.   Symptoms are worsened by drinking caffeine or drinking cold water.     Review of Systems   Constitutional:  Negative for chills and fever.   Respiratory:  Negative for shortness of breath.    Cardiovascular:  Negative for chest pain.   Gastrointestinal:  Positive for constipation.   Genitourinary:  Positive for urgency. Negative for difficulty urinating.   All other systems reviewed and are negative.      Past Medical History:   Diagnosis Date    Constipation, chronic     Diverticulosis     Glucose intolerance (impaired glucose tolerance) 5/3/2013    Hyperlipidemia 5/3/2013    Osteopenia     PSVT (paroxysmal supraventricular tachycardia)     Stress incontinence     Thyroid disease        Past Surgical History:   Procedure Laterality Date    APPENDECTOMY      BLADDER SUSPENSION      BREAST BIOPSY Right     fibroadenoma    BREAST SURGERY      R breast biopsy- benign (removed fibroid adenoma)    COLONOSCOPY N/A 12/16/2022    Procedure:  COLONOSCOPY;  Surgeon: Tad Adames MD;  Location: [facility];  Service: General;  Laterality: N/A;    COLONOSCOPY W/ BIOPSIES      polyps were negative    HYSTERECTOMY      OOPHORECTOMY      TONSILLECTOMY      VAGINAL HYSTERECTOMY W/ ANTERIOR AND POSTERIOR VAGINAL REPAIR         Family History   Problem Relation Age of Onset    Depression Mother     Diabetes Mother     Diabetes Father     Hypertension Father     Melanoma Neg Hx        Social History     Tobacco Use    Smoking status: Never     Passive exposure: Never    Smokeless tobacco: Never   Substance Use Topics    Alcohol use: Yes     Alcohol/week: 3.0 standard drinks     Types: 3 Glasses of wine per week    Drug use: No       Current Outpatient Medications   Medication Sig Dispense Refill    albuterol (PROVENTIL/VENTOLIN HFA) 90 mcg/actuation inhaler INHALE 2 PUFFS INTO THE LUNGS EVERY 6 (SIX) HOURS AS NEEDED FOR SHORTNESS OF BREATH. 6.7 g 0    benzonatate (TESSALON PERLES) 100 MG capsule Take 1 capsule (100 mg total) by mouth 3 (three) times daily as needed for Cough. 30 capsule 0    calcium carbonate (OS-JUANITO) 500 mg calcium (1,250 mg) tablet Take 2 tablets (1,000 mg total) by mouth once daily. 180 tablet 4    CYANOCOBALAMIN/COBAMAMIDE (B12 SL) Place 2,500 mcg under the tongue once daily.      LEVOCARNITINE TARTRATE (CARNITINE, TARTRATE, ORAL) Take by mouth once daily.       levothyroxine (SYNTHROID) 88 MCG tablet Take 1 tablet (88 mcg total) by mouth before breakfast. 90 tablet 3    liothyronine (CYTOMEL) 5 MCG Tab Take 1 tablet (5 mcg total) by mouth 2 (two) times a day. 180 tablet 2    multivitamin (THERAGRAN) per tablet Take 1 tablet by mouth 2 (two) times daily.      polyethylene glycol (GLYCOLAX) 17 gram/dose powder Take 17 g by mouth once daily.      tolterodine (DETROL LA) 4 MG 24 hr capsule Take 1 capsule (4 mg total) by mouth once daily. 90 capsule 1    TURMERIC (CURCUMIN MISC) Take by mouth once daily.      estradiol 0.1 mg/24 hr td ptwk 0.1  mg/24 hr PTWK APPLY 1 PATCH TRANSDERMALLYONTO THE SKIN 2 TIMES A    WEEK 24 patch 2    promethazine-dextromethorphan (PROMETHAZINE-DM) 6.25-15 mg/5 mL Syrp Take 5 mLs by mouth nightly as needed (cough). May cause drowsiness. 118 mL 0    propranoloL (INDERAL) 20 MG tablet Take 1 tablet (20 mg total) by mouth 3 (three) times daily as needed (heart palpitaitons). 30 tablet 0     No current facility-administered medications for this visit.       Review of patient's allergies indicates:   Allergen Reactions    Levaquin [levofloxacin] Nausea And Vomiting       Physical Exam  Vitals:    05/10/23 1554   BP: 127/82   Pulse: 82   Resp: 18     General: Well-developed, well-nourished, in no acute distress  HEENT: Normocephalic, atraumatic, extraocular movements intact  Neck: Supple, no supraclavicular or cervical lymphadenopathy, trachea midline  Respirations: even and unlabored  Back: midline spine, No CVA tenderness  Abdomen: soft, Non-tender, non-distended, no palpable masses, no rebound or guarding, well-healed pfannenstiel scar  : 11/11/22-Normal external female genitalia without lesions. Orthotopic urethral meatus. atrophic vaginal mucosa. Grade 1 Cysotcele, Grade 2 Rectocele, Grade 1 apical prolapse, negative cough stress test, + urethral hypermobility  Extremities: moves all equally, no clubbing, cyanosis or edema  Skin: Warm and dry. No lesions  Psych: normal affect  Neuro: Alert and oriented x 3. Cranial nerves II-XII intact    PVR: 108cc 2/10/23    Urinalysis  Negative for blood, LE, nit      Assessment:  1. OAB (overactive bladder)        2. Mixed incontinence        3. Constipation, chronic                  Plan:   OAB (overactive bladder)    Mixed incontinence    Constipation, chronic      Continue detrol prn. Pt to contact me if she decides to do anything more aggressive  Continue constipation management with miralax and water      Follow up in about 1 year (around 5/10/2024).

## 2023-08-23 ENCOUNTER — PATIENT MESSAGE (OUTPATIENT)
Dept: ADMINISTRATIVE | Facility: HOSPITAL | Age: 68
End: 2023-08-23
Payer: MEDICARE

## 2023-08-29 ENCOUNTER — PATIENT OUTREACH (OUTPATIENT)
Dept: ADMINISTRATIVE | Facility: HOSPITAL | Age: 68
End: 2023-08-29
Payer: MEDICARE

## 2023-08-29 ENCOUNTER — OFFICE VISIT (OUTPATIENT)
Dept: INTERNAL MEDICINE | Facility: CLINIC | Age: 68
End: 2023-08-29
Payer: MEDICARE

## 2023-08-29 ENCOUNTER — HOSPITAL ENCOUNTER (OUTPATIENT)
Dept: RADIOLOGY | Facility: HOSPITAL | Age: 68
Discharge: HOME | End: 2023-08-29
Attending: FAMILY MEDICINE
Payer: MEDICARE

## 2023-08-29 VITALS
BODY MASS INDEX: 23.82 KG/M2 | HEART RATE: 76 BPM | OXYGEN SATURATION: 98 % | SYSTOLIC BLOOD PRESSURE: 132 MMHG | HEIGHT: 64 IN | DIASTOLIC BLOOD PRESSURE: 80 MMHG | TEMPERATURE: 99 F | WEIGHT: 139.56 LBS | RESPIRATION RATE: 18 BRPM

## 2023-08-29 DIAGNOSIS — M41.116 JUVENILE IDIOPATHIC SCOLIOSIS OF LUMBAR REGION: ICD-10-CM

## 2023-08-29 DIAGNOSIS — G89.29 CHRONIC BILATERAL LOW BACK PAIN WITHOUT SCIATICA: Chronic | ICD-10-CM

## 2023-08-29 DIAGNOSIS — J30.2 SEASONAL ALLERGIC RHINITIS, UNSPECIFIED TRIGGER: Primary | Chronic | ICD-10-CM

## 2023-08-29 DIAGNOSIS — M41.116 JUVENILE IDIOPATHIC SCOLIOSIS OF LUMBAR REGION: Chronic | ICD-10-CM

## 2023-08-29 DIAGNOSIS — G89.29 CHRONIC BILATERAL LOW BACK PAIN WITHOUT SCIATICA: ICD-10-CM

## 2023-08-29 DIAGNOSIS — M54.50 CHRONIC BILATERAL LOW BACK PAIN WITHOUT SCIATICA: ICD-10-CM

## 2023-08-29 DIAGNOSIS — Z23 NEED FOR PNEUMOCOCCAL VACCINE: ICD-10-CM

## 2023-08-29 DIAGNOSIS — M54.50 CHRONIC BILATERAL LOW BACK PAIN WITHOUT SCIATICA: Chronic | ICD-10-CM

## 2023-08-29 DIAGNOSIS — Z12.31 BREAST CANCER SCREENING BY MAMMOGRAM: ICD-10-CM

## 2023-08-29 PROBLEM — I20.9 AP (ANGINA PECTORIS): Status: RESOLVED | Noted: 2023-02-27 | Resolved: 2023-08-29

## 2023-08-29 PROCEDURE — G0009 PNEUMOCOCCAL CONJUGATE VACCINE 20-VALENT: ICD-10-PCS | Mod: S$GLB,,, | Performed by: FAMILY MEDICINE

## 2023-08-29 PROCEDURE — 99999 PR PBB SHADOW E&M-EST. PATIENT-LVL V: CPT | Mod: PBBFAC,,, | Performed by: FAMILY MEDICINE

## 2023-08-29 PROCEDURE — 1160F PR REVIEW ALL MEDS BY PRESCRIBER/CLIN PHARMACIST DOCUMENTED: ICD-10-PCS | Mod: CPTII,S$GLB,, | Performed by: FAMILY MEDICINE

## 2023-08-29 PROCEDURE — 72082 X-RAY EXAM ENTIRE SPI 2/3 VW: CPT | Mod: 26,,, | Performed by: RADIOLOGY

## 2023-08-29 PROCEDURE — 3075F SYST BP GE 130 - 139MM HG: CPT | Mod: CPTII,S$GLB,, | Performed by: FAMILY MEDICINE

## 2023-08-29 PROCEDURE — 3079F DIAST BP 80-89 MM HG: CPT | Mod: CPTII,S$GLB,, | Performed by: FAMILY MEDICINE

## 2023-08-29 PROCEDURE — 1160F RVW MEDS BY RX/DR IN RCRD: CPT | Mod: CPTII,S$GLB,, | Performed by: FAMILY MEDICINE

## 2023-08-29 PROCEDURE — 99214 PR OFFICE/OUTPT VISIT, EST, LEVL IV, 30-39 MIN: ICD-10-PCS | Mod: S$GLB,,, | Performed by: FAMILY MEDICINE

## 2023-08-29 PROCEDURE — 1159F PR MEDICATION LIST DOCUMENTED IN MEDICAL RECORD: ICD-10-PCS | Mod: CPTII,S$GLB,, | Performed by: FAMILY MEDICINE

## 2023-08-29 PROCEDURE — 3288F PR FALLS RISK ASSESSMENT DOCUMENTED: ICD-10-PCS | Mod: CPTII,S$GLB,, | Performed by: FAMILY MEDICINE

## 2023-08-29 PROCEDURE — 99999 PR PBB SHADOW E&M-EST. PATIENT-LVL V: ICD-10-PCS | Mod: PBBFAC,,, | Performed by: FAMILY MEDICINE

## 2023-08-29 PROCEDURE — 3075F PR MOST RECENT SYSTOLIC BLOOD PRESS GE 130-139MM HG: ICD-10-PCS | Mod: CPTII,S$GLB,, | Performed by: FAMILY MEDICINE

## 2023-08-29 PROCEDURE — 3008F PR BODY MASS INDEX (BMI) DOCUMENTED: ICD-10-PCS | Mod: CPTII,S$GLB,, | Performed by: FAMILY MEDICINE

## 2023-08-29 PROCEDURE — 1126F AMNT PAIN NOTED NONE PRSNT: CPT | Mod: CPTII,S$GLB,, | Performed by: FAMILY MEDICINE

## 2023-08-29 PROCEDURE — 3008F BODY MASS INDEX DOCD: CPT | Mod: CPTII,S$GLB,, | Performed by: FAMILY MEDICINE

## 2023-08-29 PROCEDURE — 3288F FALL RISK ASSESSMENT DOCD: CPT | Mod: CPTII,S$GLB,, | Performed by: FAMILY MEDICINE

## 2023-08-29 PROCEDURE — 1159F MED LIST DOCD IN RCRD: CPT | Mod: CPTII,S$GLB,, | Performed by: FAMILY MEDICINE

## 2023-08-29 PROCEDURE — 72082 XR SPINE SURVEY AP AND LATERAL: ICD-10-PCS | Mod: 26,,, | Performed by: RADIOLOGY

## 2023-08-29 PROCEDURE — 1101F PT FALLS ASSESS-DOCD LE1/YR: CPT | Mod: CPTII,S$GLB,, | Performed by: FAMILY MEDICINE

## 2023-08-29 PROCEDURE — 1126F PR PAIN SEVERITY QUANTIFIED, NO PAIN PRESENT: ICD-10-PCS | Mod: CPTII,S$GLB,, | Performed by: FAMILY MEDICINE

## 2023-08-29 PROCEDURE — G0009 ADMIN PNEUMOCOCCAL VACCINE: HCPCS | Mod: S$GLB,,, | Performed by: FAMILY MEDICINE

## 2023-08-29 PROCEDURE — 90677 PNEUMOCOCCAL CONJUGATE VACCINE 20-VALENT: ICD-10-PCS | Mod: S$GLB,,, | Performed by: FAMILY MEDICINE

## 2023-08-29 PROCEDURE — 90677 PCV20 VACCINE IM: CPT | Mod: S$GLB,,, | Performed by: FAMILY MEDICINE

## 2023-08-29 PROCEDURE — 3079F PR MOST RECENT DIASTOLIC BLOOD PRESSURE 80-89 MM HG: ICD-10-PCS | Mod: CPTII,S$GLB,, | Performed by: FAMILY MEDICINE

## 2023-08-29 PROCEDURE — 99214 OFFICE O/P EST MOD 30 MIN: CPT | Mod: S$GLB,,, | Performed by: FAMILY MEDICINE

## 2023-08-29 PROCEDURE — 1101F PR PT FALLS ASSESS DOC 0-1 FALLS W/OUT INJ PAST YR: ICD-10-PCS | Mod: CPTII,S$GLB,, | Performed by: FAMILY MEDICINE

## 2023-08-29 PROCEDURE — 72082 X-RAY EXAM ENTIRE SPI 2/3 VW: CPT | Mod: TC

## 2023-08-29 RX ORDER — CETIRIZINE HYDROCHLORIDE 10 MG/1
10 TABLET ORAL DAILY
Qty: 90 TABLET | Refills: 3 | Status: SHIPPED | OUTPATIENT
Start: 2023-08-29 | End: 2023-12-01 | Stop reason: SDUPTHER

## 2023-08-29 RX ORDER — MONTELUKAST SODIUM 10 MG/1
10 TABLET ORAL NIGHTLY
Qty: 90 TABLET | Refills: 1 | Status: SHIPPED | OUTPATIENT
Start: 2023-08-29 | End: 2023-12-01 | Stop reason: SDUPTHER

## 2023-08-29 RX ORDER — AZELASTINE 1 MG/ML
2 SPRAY, METERED NASAL 2 TIMES DAILY
Qty: 60 ML | Refills: 11 | Status: SHIPPED | OUTPATIENT
Start: 2023-08-29 | End: 2023-12-01 | Stop reason: SDUPTHER

## 2023-08-29 RX ORDER — FLUTICASONE PROPIONATE 50 MCG
2 SPRAY, SUSPENSION (ML) NASAL DAILY
Qty: 32 G | Refills: 5 | Status: SHIPPED | OUTPATIENT
Start: 2023-08-29 | End: 2023-12-01 | Stop reason: SDUPTHER

## 2023-08-29 NOTE — PROGRESS NOTES
Epic CAMPAIGN: per pt portal response mammogram requested, already scheduled for 10/19/23 [facility] 10am.

## 2023-08-29 NOTE — PROGRESS NOTES
OFFICE VISIT 8/29/23  9:40 AM CDT    CHIEF COMPLAINT: Follow-up    1. Seasonal allergic rhinitis, unspecified trigger  This is a chronic problem, with exacerbation or progression. She describes typical chronic seasonal nasal congestion and rhinorrhea exacerbated changes in weather, described as moderately severe at worse. She has been taking OTC Flonase without relief. We discussed risks and benefits of treatment options.  -     fluticasone propionate (FLONASE) 50 mcg/actuation nasal spray; 2 sprays (100 mcg total) by Each Nostril route once daily.  Dispense: 32 g; Refill: 5  -     montelukast (SINGULAIR) 10 mg tablet; Take 1 tablet (10 mg total) by mouth every evening.  Dispense: 90 tablet; Refill: 1  -     cetirizine (ZYRTEC) 10 MG tablet; Take 1 tablet (10 mg total) by mouth once daily.  Dispense: 90 tablet; Refill: 3  -     azelastine (ASTELIN) 137 mcg (0.1 %) nasal spray; 2 sprays (274 mcg total) by Nasal route 2 (two) times daily.  Dispense: 60 mL; Refill: 11    2. Juvenile idiopathic scoliosis of lumbar region  3. Chronic bilateral low back pain without sciatica  This is a chronic problem, with mild exacerbation or progression. She reports no fever, sweats, involuntary weight loss, loss of lower extremity strength or sensation, saddle anesthesia, or incontinence of urine or feces.  -     X-Ray Spine Survey AP And Lateral; Future; Expected date: 08/29/2023    4. Breast cancer screening by mammogram  -     Mammo Digital Screening Bilat w/ [facility]; Future; Expected date: 08/29/2023    5. Need for pneumococcal vaccine  -     pneumoc 20-chrissie conj-dip cr,PF, (PREVNAR-20, PF,) 0.5 mL Syrg injection; Inject 0.5 mLs into the muscle once. for 1 dose  Dispense: 0.5 mL; Refill: 0    Unless noted herein, any chronic conditions are represented as and appear stable, and no other significant complaints or concerns were reported.    Follow up in about 3 months (around 11/29/2023) for virtual visit, re-evaluation.   Future  "Appointments   Date Time Provider Department Center   5/10/2024 12:15 PM Ratna Askew MD [facility]       Review of Systems   Constitutional:  Negative for diaphoresis and fever.   Respiratory:  Negative for chest tightness and shortness of breath.    Cardiovascular:  Negative for chest pain.   Gastrointestinal:  Negative for change in bowel habit, fecal incontinence and change in bowel habit.   Genitourinary:  Negative for bladder incontinence, difficulty urinating, dysuria and hematuria.   Neurological:         No saddle anesthesia or loss of lower extremity strength or sensation.     Vitals:    08/29/23 0957   BP: 132/80   BP Location: Right arm   Patient Position: Sitting   BP Method: Small (Manual)   Pulse: 76   Resp: 18   Temp: 98.7 °F (37.1 °C)   SpO2: 98%   Weight: 63.3 kg (139 lb 8.8 oz)   Height: 5' 4" (1.626 m)   Physical Exam  Vitals reviewed.   Constitutional:       General: She is not in acute distress.     Appearance: Normal appearance. She is not ill-appearing or diaphoretic.   Cardiovascular:      Rate and Rhythm: Normal rate and regular rhythm.   Pulmonary:      Effort: Pulmonary effort is normal.      Breath sounds: Normal breath sounds.   Musculoskeletal:      Thoracic back: Normal.      Lumbar back: Scoliosis (mild) present.   Skin:     General: Skin is warm and dry.   Neurological:      Mental Status: She is alert and oriented to person, place, and time. Mental status is at baseline.   Psychiatric:         Mood and Affect: Mood normal.         Behavior: Behavior normal.         Judgment: Judgment normal.   Documentation entered by me for this encounter may have been done in part using speech-recognition technology. Although I have made an effort to ensure accuracy, "sound like" errors may exist and should be interpreted in context.   "

## 2023-09-17 ENCOUNTER — PATIENT MESSAGE (OUTPATIENT)
Dept: INTERNAL MEDICINE | Facility: CLINIC | Age: 68
End: 2023-09-17
Payer: MEDICARE

## 2023-10-19 ENCOUNTER — HOSPITAL ENCOUNTER (OUTPATIENT)
Dept: RADIOLOGY | Facility: HOSPITAL | Age: 68
Discharge: HOME | End: 2023-10-19
Attending: FAMILY MEDICINE
Payer: MEDICARE

## 2023-10-19 VITALS — BODY MASS INDEX: 23.82 KG/M2 | WEIGHT: 139.56 LBS | HEIGHT: 64 IN

## 2023-10-19 DIAGNOSIS — Z12.31 BREAST CANCER SCREENING BY MAMMOGRAM: ICD-10-CM

## 2023-10-19 PROCEDURE — 77067 MAMMO DIGITAL SCREENING BILAT WITH TOMO: ICD-10-PCS | Mod: 26,,, | Performed by: RADIOLOGY

## 2023-10-19 PROCEDURE — 77063 BREAST TOMOSYNTHESIS BI: CPT | Mod: 26,,, | Performed by: RADIOLOGY

## 2023-10-19 PROCEDURE — 77063 MAMMO DIGITAL SCREENING BILAT WITH TOMO: ICD-10-PCS | Mod: 26,,, | Performed by: RADIOLOGY

## 2023-10-19 PROCEDURE — 77067 SCR MAMMO BI INCL CAD: CPT | Mod: 26,,, | Performed by: RADIOLOGY

## 2023-10-19 PROCEDURE — 77067 SCR MAMMO BI INCL CAD: CPT | Mod: TC

## 2023-10-20 ENCOUNTER — TELEPHONE (OUTPATIENT)
Dept: INTERNAL MEDICINE | Facility: CLINIC | Age: 68
End: 2023-10-20

## 2023-10-20 ENCOUNTER — TELEPHONE (OUTPATIENT)
Dept: RADIOLOGY | Facility: HOSPITAL | Age: 68
End: 2023-10-20
Payer: MEDICARE

## 2023-10-20 ENCOUNTER — PATIENT MESSAGE (OUTPATIENT)
Dept: OBSTETRICS AND GYNECOLOGY | Facility: CLINIC | Age: 68
End: 2023-10-20
Payer: MEDICARE

## 2023-10-20 ENCOUNTER — PATIENT MESSAGE (OUTPATIENT)
Dept: INTERNAL MEDICINE | Facility: CLINIC | Age: 68
End: 2023-10-20
Payer: MEDICARE

## 2023-10-20 NOTE — TELEPHONE ENCOUNTER
----- Message from Christine Ramon sent at 10/20/2023  9:14 AM CDT -----  Good Morning!    Will you please put in a order for a diagnostic mammogram and ultrasound of the left breast limited for this patient?    Thank You,    Christine SMART  Radiology Dept

## 2023-10-20 NOTE — TELEPHONE ENCOUNTER
"ORDER ORDERED   Mammo Digital Diagnostic Left with Campos 10/20/2023   US Breast Left Limited 10/20/2023   Order placed as a reflex to Mammo Digital Screening Bilat w/ Campos ordered on 08/29/23 at 1050  Ordering user: Tahira Elizabeth LPN 10/20/23 1025  Ordering mode: Written Guideline Order/Approved Standing Order    TO MY TEAM:  These "reflex orders" are automatically done by written order guidelines in the mammography center. We should not receive requests to enter these orders.    This is just FYI only. No action required.   "

## 2023-11-02 ENCOUNTER — HOSPITAL ENCOUNTER (OUTPATIENT)
Dept: RADIOLOGY | Facility: HOSPITAL | Age: 68
Discharge: HOME | End: 2023-11-02
Attending: FAMILY MEDICINE
Payer: MEDICARE

## 2023-11-02 ENCOUNTER — PATIENT MESSAGE (OUTPATIENT)
Dept: INTERNAL MEDICINE | Facility: CLINIC | Age: 68
End: 2023-11-02
Payer: MEDICARE

## 2023-11-02 DIAGNOSIS — R92.8 ABNORMAL MAMMOGRAM: ICD-10-CM

## 2023-11-02 PROCEDURE — 77065 DX MAMMO INCL CAD UNI: CPT | Mod: TC,LT

## 2023-11-02 PROCEDURE — 77065 DX MAMMO INCL CAD UNI: CPT | Mod: 26,LT,, | Performed by: RADIOLOGY

## 2023-11-02 PROCEDURE — 77061 BREAST TOMOSYNTHESIS UNI: CPT | Mod: 26,LT,, | Performed by: RADIOLOGY

## 2023-11-02 PROCEDURE — 77061 MAMMO DIGITAL DIAGNOSTIC LEFT WITH TOMO: ICD-10-PCS | Mod: 26,LT,, | Performed by: RADIOLOGY

## 2023-11-02 PROCEDURE — 77065 MAMMO DIGITAL DIAGNOSTIC LEFT WITH TOMO: ICD-10-PCS | Mod: 26,LT,, | Performed by: RADIOLOGY

## 2023-11-02 PROCEDURE — 76642 ULTRASOUND BREAST LIMITED: CPT | Mod: TC,LT

## 2023-11-02 PROCEDURE — 76642 ULTRASOUND BREAST LIMITED: CPT | Mod: 26,LT,, | Performed by: RADIOLOGY

## 2023-11-02 PROCEDURE — 76642 US BREAST LEFT LIMITED: ICD-10-PCS | Mod: 26,LT,, | Performed by: RADIOLOGY

## 2023-11-03 ENCOUNTER — TELEPHONE (OUTPATIENT)
Dept: RADIOLOGY | Facility: HOSPITAL | Age: 68
End: 2023-11-03
Payer: MEDICARE

## 2023-11-03 DIAGNOSIS — R92.8 ABNORMAL MAMMOGRAM: Primary | ICD-10-CM

## 2023-11-03 NOTE — TELEPHONE ENCOUNTER
Ultrasound guided biopsy scheduled for 11/8/23 at 9:30, arrival time 9am. Biopsy instructions given with understandings verbalized. Patient has my contact information.

## 2023-11-08 ENCOUNTER — HOSPITAL ENCOUNTER (OUTPATIENT)
Dept: RADIOLOGY | Facility: HOSPITAL | Age: 68
Discharge: HOME | End: 2023-11-08
Attending: FAMILY MEDICINE
Payer: MEDICARE

## 2023-11-08 DIAGNOSIS — R92.8 ABNORMAL MAMMOGRAM: ICD-10-CM

## 2023-11-08 PROCEDURE — 88305 TISSUE EXAM BY PATHOLOGIST: CPT | Performed by: STUDENT IN AN ORGANIZED HEALTH CARE EDUCATION/TRAINING PROGRAM

## 2023-11-08 PROCEDURE — 77065 DX MAMMO INCL CAD UNI: CPT | Mod: 26,LT,, | Performed by: RADIOLOGY

## 2023-11-08 PROCEDURE — 19083 BX BREAST 1ST LESION US IMAG: CPT | Mod: LT,,, | Performed by: RADIOLOGY

## 2023-11-08 PROCEDURE — 77065 MAMMO DIGITAL DIAGNOSTIC LEFT: ICD-10-PCS | Mod: 26,LT,, | Performed by: RADIOLOGY

## 2023-11-08 PROCEDURE — A4648 IMPLANTABLE TISSUE MARKER: HCPCS

## 2023-11-08 PROCEDURE — 88305 TISSUE EXAM BY PATHOLOGIST: CPT | Mod: 26,,, | Performed by: STUDENT IN AN ORGANIZED HEALTH CARE EDUCATION/TRAINING PROGRAM

## 2023-11-08 PROCEDURE — 88305 TISSUE EXAM BY PATHOLOGIST: ICD-10-PCS | Mod: 26,,, | Performed by: STUDENT IN AN ORGANIZED HEALTH CARE EDUCATION/TRAINING PROGRAM

## 2023-11-08 PROCEDURE — 19083 US BREAST BIOPSY WITH IMAGING 1ST SITE LEFT: ICD-10-PCS | Mod: LT,,, | Performed by: RADIOLOGY

## 2023-11-08 PROCEDURE — 77065 DX MAMMO INCL CAD UNI: CPT | Mod: TC,LT

## 2023-11-10 ENCOUNTER — TELEPHONE (OUTPATIENT)
Dept: SURGERY | Facility: CLINIC | Age: 68
End: 2023-11-10
Payer: MEDICARE

## 2023-11-10 LAB
FINAL PATHOLOGIC DIAGNOSIS: NORMAL
GROSS: NORMAL
Lab: NORMAL

## 2023-11-10 NOTE — TELEPHONE ENCOUNTER
Called patient to discuss benign breast biopsy results- we reviewed the pathology report. Pt verbalized understanding of all information. Pt states everything is healing well at this time and denies any further needs.      ----- Message from Gabino Crespo DO sent at 11/10/2023 12:21 PM CST -----  Benign and concordant.      Thank you.

## 2023-11-13 ENCOUNTER — TELEPHONE (OUTPATIENT)
Dept: SURGERY | Facility: CLINIC | Age: 68
End: 2023-11-13
Payer: MEDICARE

## 2023-11-13 NOTE — TELEPHONE ENCOUNTER
Called patient to discuss breast biopsy results- left voicemail with callback number [phone].     ----- Message from Gabino Crespo DO sent at 11/10/2023 12:21 PM CST -----  Benign and concordant.      Thank you.

## 2023-11-14 ENCOUNTER — TELEPHONE (OUTPATIENT)
Dept: SURGERY | Facility: CLINIC | Age: 68
End: 2023-11-14
Payer: MEDICARE

## 2023-11-14 NOTE — TELEPHONE ENCOUNTER
Called patient to discuss benign breast biopsy results- we reviewed the pathology report. Pt verbalized understanding of all information. Pt states everything is healing well at this time and denies any further needs.    ----- Message from Gabino Crespo DO sent at 11/10/2023 12:21 PM CST -----  Benign and concordant.      Thank you.   
Initial (On Arrival)

## 2023-11-28 DIAGNOSIS — J30.2 SEASONAL ALLERGIC RHINITIS, UNSPECIFIED TRIGGER: Chronic | ICD-10-CM

## 2023-11-29 NOTE — TELEPHONE ENCOUNTER
No care due was identified.  [facility] Embedded Care Due Messages. Reference number: [id number].   11/28/2023 6:36:04 PM CST

## 2023-11-29 NOTE — TELEPHONE ENCOUNTER
Refill Routing Note   Medication(s) are not appropriate for processing by [facility] for the following reason(s):        New or recently adjusted medication    [facility] action(s):  Defer               Appointments  past 12m or future 3m with PCP    Date Provider   Last Visit   8/29/2023 MARILYNN Plummer MD   Next Visit   12/1/2023 MARILYNN Plummer MD   ED visits in past 90 days: 0        Note composed:10:14 PM 11/28/2023

## 2023-12-01 ENCOUNTER — TELEPHONE (OUTPATIENT)
Dept: INTERNAL MEDICINE | Facility: CLINIC | Age: 68
End: 2023-12-01
Payer: MEDICARE

## 2023-12-01 ENCOUNTER — OFFICE VISIT (OUTPATIENT)
Dept: INTERNAL MEDICINE | Facility: CLINIC | Age: 68
End: 2023-12-01
Payer: MEDICARE

## 2023-12-01 DIAGNOSIS — E03.9 ACQUIRED HYPOTHYROIDISM: Primary | Chronic | ICD-10-CM

## 2023-12-01 DIAGNOSIS — M43.16 ANTEROLISTHESIS OF LUMBAR SPINE: ICD-10-CM

## 2023-12-01 DIAGNOSIS — G89.29 CHRONIC BILATERAL LOW BACK PAIN WITHOUT SCIATICA: Chronic | ICD-10-CM

## 2023-12-01 DIAGNOSIS — M54.50 CHRONIC BILATERAL LOW BACK PAIN WITHOUT SCIATICA: Chronic | ICD-10-CM

## 2023-12-01 DIAGNOSIS — J30.2 SEASONAL ALLERGIC RHINITIS, UNSPECIFIED TRIGGER: Chronic | ICD-10-CM

## 2023-12-01 DIAGNOSIS — Z86.39 HISTORY OF METABOLIC DISORDER: ICD-10-CM

## 2023-12-01 DIAGNOSIS — E05.80 IATROGENIC HYPERTHYROIDISM: ICD-10-CM

## 2023-12-01 DIAGNOSIS — Z13.1 SCREENING FOR DIABETES MELLITUS: ICD-10-CM

## 2023-12-01 DIAGNOSIS — R00.2 PALPITATIONS: Chronic | ICD-10-CM

## 2023-12-01 DIAGNOSIS — M43.06 PARS DEFECT OF LUMBAR SPINE: ICD-10-CM

## 2023-12-01 DIAGNOSIS — M41.114 JUVENILE IDIOPATHIC SCOLIOSIS OF THORACIC REGION: Chronic | ICD-10-CM

## 2023-12-01 PROCEDURE — 1160F RVW MEDS BY RX/DR IN RCRD: CPT | Mod: CPTII,95,, | Performed by: FAMILY MEDICINE

## 2023-12-01 PROCEDURE — 99214 PR OFFICE/OUTPT VISIT, EST, LEVL IV, 30-39 MIN: ICD-10-PCS | Mod: 95,,, | Performed by: FAMILY MEDICINE

## 2023-12-01 PROCEDURE — 99214 OFFICE O/P EST MOD 30 MIN: CPT | Mod: 95,,, | Performed by: FAMILY MEDICINE

## 2023-12-01 PROCEDURE — 1159F PR MEDICATION LIST DOCUMENTED IN MEDICAL RECORD: ICD-10-PCS | Mod: CPTII,95,, | Performed by: FAMILY MEDICINE

## 2023-12-01 PROCEDURE — 1160F PR REVIEW ALL MEDS BY PRESCRIBER/CLIN PHARMACIST DOCUMENTED: ICD-10-PCS | Mod: CPTII,95,, | Performed by: FAMILY MEDICINE

## 2023-12-01 PROCEDURE — 1159F MED LIST DOCD IN RCRD: CPT | Mod: CPTII,95,, | Performed by: FAMILY MEDICINE

## 2023-12-01 RX ORDER — AZELASTINE 1 MG/ML
2 SPRAY, METERED NASAL 2 TIMES DAILY
Qty: 60 ML | Refills: 11 | Status: SHIPPED | OUTPATIENT
Start: 2023-12-01 | End: 2024-02-13 | Stop reason: SDUPTHER

## 2023-12-01 RX ORDER — MONTELUKAST SODIUM 10 MG/1
10 TABLET ORAL NIGHTLY
Qty: 90 TABLET | Refills: 3 | Status: SHIPPED | OUTPATIENT
Start: 2023-12-01 | End: 2024-02-13 | Stop reason: SDUPTHER

## 2023-12-01 RX ORDER — FLUTICASONE PROPIONATE 50 MCG
2 SPRAY, SUSPENSION (ML) NASAL DAILY
COMMUNITY
Start: 2023-12-01

## 2023-12-01 RX ORDER — MONTELUKAST SODIUM 10 MG/1
10 TABLET ORAL NIGHTLY
Qty: 90 TABLET | Refills: 2 | OUTPATIENT
Start: 2023-12-01

## 2023-12-01 RX ORDER — LEVOTHYROXINE SODIUM 50 UG/1
50 TABLET ORAL
Qty: 90 TABLET | Refills: 0 | Status: SHIPPED | OUTPATIENT
Start: 2023-12-01 | End: 2024-02-05

## 2023-12-01 RX ORDER — PROPRANOLOL HYDROCHLORIDE 20 MG/1
20 TABLET ORAL 2 TIMES DAILY PRN
Qty: 90 TABLET | Refills: 2 | Status: SHIPPED | OUTPATIENT
Start: 2023-12-01 | End: 2024-02-13 | Stop reason: SDUPTHER

## 2023-12-01 RX ORDER — CETIRIZINE HYDROCHLORIDE 10 MG/1
10 TABLET ORAL DAILY
COMMUNITY
Start: 2023-12-01

## 2023-12-01 NOTE — TELEPHONE ENCOUNTER
Response to request for refill of this medicine handled separately.  Requested Prescriptions   Pending Prescriptions Disp Refills    montelukast (SINGULAIR) 10 mg tablet [Pharmacy Med Name: Montelukast Sodium 10 MG Oral Tablet] 90 tablet 2     Sig: TAKE 1 TABLET BY MOUTH IN THE  EVENING

## 2023-12-01 NOTE — ASSESSMENT & PLAN NOTE
"Lab Results   Component Value Date    TSH 0.024 (L) 08/29/2023    TSH 0.211 (L) 01/26/2023    TSH 0.320 (L) 10/21/2022    FREET4 0.96 08/29/2023    FREET4 0.91 01/26/2023    FREET4 0.88 10/21/2022     No results found for: "THYROIDSTIMI", "THYROPEROXID", "THYGLBTUM", "THGABSCRN", "THYRGINTERP"   "

## 2023-12-01 NOTE — PATIENT INSTRUCTIONS
A/Ox4, VSS on 2L, denies pain, IVF running & intermittent IV Abx, NPO for hepatic abscess drainage today. SBA.       Stop taking levothyroxine 88 mcg (or any dose) until December 8. Then start taking levothyroxine 50 mcg each morning as directed.

## 2023-12-01 NOTE — TELEPHONE ENCOUNTER
----- Message from MARILYNN Plummer MD sent at 12/1/2023  1:47 PM CST -----  UPDATED INSTRUCTIONS (Adding A1c lab):    Schedule TSH and A1c labs in early Feb and VV F/U with me a few days later.  Reinforce instructions: Stop taking levothyroxine 88 mcg (or any dose) until December 8. Then start taking levothyroxine 50 mcg each morning as directed.

## 2023-12-01 NOTE — Clinical Note
Schedule TSH lab in early Feb and VV F/U with me a few days later. Reinforce instructions: Stop taking levothyroxine 88 mcg (or any dose) until December 8. Then start taking levothyroxine 50 mcg each morning as directed.

## 2023-12-01 NOTE — PROGRESS NOTES
"TELEMEDICINE VIRTUAL VIDEO VISIT  12/1/23  1:20 PM CST    Visit Type: Audiovisual    Patient's Location: Mone represents that they are located within the [facility].    CHIEF COMPLAINT: Follow-up    Unless specified otherwise, chronic conditions are represented as and appear to be compensated/controlled and stable.      1. Acquired hypothyroidism  Assessment & Plan:  Lab Results   Component Value Date    TSH 0.024 (L) 08/29/2023    TSH 0.211 (L) 01/26/2023    TSH 0.320 (L) 10/21/2022    FREET4 0.96 08/29/2023    FREET4 0.91 01/26/2023    FREET4 0.88 10/21/2022     No results found for: "THYROIDSTIMI", "THYROPEROXID", "THYGLBTUM", "THGABSCRN", "THYRGINTERP"     Orders:  -     levothyroxine (SYNTHROID) 50 MCG tablet; Take 1 tablet (50 mcg total) by mouth before breakfast.  Dispense: 90 tablet; Refill: 0  -     TSH; Future; Expected date: 12/01/2023    2. Iatrogenic hyperthyroidism  -     TSH; Future; Expected date: 12/01/2023    3. Juvenile idiopathic scoliosis of thoracic region    4. Anterolisthesis of lumbar spine  -     Ambulatory referral/consult to Physical/Occupational Therapy; Future; Expected date: 12/08/2023    5. Pars defect of lumbar spine  -     Ambulatory referral/consult to Physical/Occupational Therapy; Future; Expected date: 12/08/2023    6. Chronic bilateral low back pain without sciatica  -     Ambulatory referral/consult to Physical/Occupational Therapy; Future; Expected date: 12/08/2023    7. Seasonal allergic rhinitis, unspecified trigger  -     azelastine (ASTELIN) 137 mcg (0.1 %) nasal spray; 2 sprays (274 mcg total) by Nasal route 2 (two) times daily.  Dispense: 60 mL; Refill: 11  -     cetirizine (ZYRTEC) 10 MG tablet; Take 1 tablet (10 mg total) by mouth once daily.  -     fluticasone propionate (FLONASE) 50 mcg/actuation nasal spray; 2 sprays (100 mcg total) by Each Nostril route once daily.  -     montelukast (SINGULAIR) 10 mg tablet; Take 1 tablet (10 mg total) by mouth every " evening.  Dispense: 90 tablet; Refill: 3    8. Benign palpitations  -     propranoloL (INDERAL) 20 MG tablet; Take 1 tablet (20 mg total) by mouth 2 (two) times daily as needed (for heart palpitaitons).  Dispense: 90 tablet; Refill: 2    9. Screening for diabetes mellitus  -     Hemoglobin A1C; Future; Expected date: 12/01/2023    10. History of metabolic disorder  -     Hemoglobin A1C; Future; Expected date: 12/01/2023    Unless noted herein, any chronic conditions are represented as and appear stable, and no other significant complaints or concerns were reported.    Review of Systems   Constitutional:  Negative for activity change and unexpected weight change.   HENT:  Negative for hearing loss, rhinorrhea and trouble swallowing.    Eyes:  Negative for discharge and visual disturbance.   Respiratory:  Negative for chest tightness and wheezing.    Cardiovascular:  Negative for chest pain and palpitations.   Gastrointestinal:  Negative for blood in stool, constipation, diarrhea and vomiting.   Endocrine: Negative for polydipsia and polyuria.   Genitourinary:  Negative for difficulty urinating, dysuria, hematuria and menstrual problem.   Musculoskeletal:  Positive for arthralgias. Negative for joint swelling and neck pain.   Neurological:  Negative for weakness and headaches.   Psychiatric/Behavioral:  Negative for confusion and dysphoric mood.        Physical Exam  Constitutional:       General: She is not in acute distress.     Appearance: Normal appearance. She is not ill-appearing.   Pulmonary:      Effort: Pulmonary effort is normal. No respiratory distress.   Skin:     Coloration: Skin is not jaundiced.   Neurological:      Mental Status: She is alert. Mental status is at baseline.   Psychiatric:         Mood and Affect: Mood normal.         Behavior: Behavior normal.         Thought Content: Thought content normal.       Follow up in about 10 weeks (around 2/9/2024) for virtual visit, review test results after  "completion, discuss plan, re-evaluation.   Future Appointments   Date Time Provider Department [place]   2/6/2024 10:10 AM LABORATORY, [facility]   2/13/2024  1:40 PM MARILYNN Plummer MD [facility]   5/10/2024 12:15 PM Ratna Askew MD [facility]       Documentation entered by me for this encounter may have been done in part using speech-recognition technology. Although I have made an effort to ensure accuracy, "sound like" errors may exist and should be interpreted in context.   TOTAL TIME evaluating and managing this patient for this encounter was greater than or equal to 27 minutes.  This time was exclusive of any separately billable procedures for this patient and exclusive of time spent treating any other patient.    Documentation entered by me for this encounter may have been done in part using speech-recognition technology. Although I have made an effort to ensure accuracy, "sound like" errors may exist and should be interpreted in context.    Each patient to whom medical services are provided by telemedicine is: (1) informed of the relationship between the physician and patient and the respective role of any other health care provider with respect to management of the patient; and (2) notified that he or she may decline to receive medical services by telemedicine and may withdraw from such care at any time.  "

## 2023-12-01 NOTE — Clinical Note
UPDATED INSTRUCTIONS (Adding A1c lab):  Schedule TSH and A1c labs in early Feb and VV F/U with me a few days later. Reinforce instructions: Stop taking levothyroxine 88 mcg (or any dose) until December 8. Then start taking levothyroxine 50 mcg each morning as directed.

## 2023-12-01 NOTE — TELEPHONE ENCOUNTER
----- Message from MARILYNN Plummer MD sent at 12/1/2023  1:46 PM CST -----  Schedule TSH lab in early Feb and VV F/U with me a few days later.  Reinforce instructions: Stop taking levothyroxine 88 mcg (or any dose) until December 8. Then start taking levothyroxine 50 mcg each morning as directed.

## 2023-12-06 RX ORDER — TOLTERODINE 4 MG/1
4 CAPSULE, EXTENDED RELEASE ORAL
Qty: 90 CAPSULE | Refills: 3 | Status: SHIPPED | OUTPATIENT
Start: 2023-12-06

## 2024-01-30 DIAGNOSIS — E03.9 ACQUIRED HYPOTHYROIDISM: Chronic | ICD-10-CM

## 2024-01-30 NOTE — TELEPHONE ENCOUNTER
Refill Routing Note   Medication(s) are not appropriate for processing by [facility] for the following reason(s):        New or recently adjusted medication:     [facility] action(s):  Defer      Medication Therapy Plan:         Appointments  past 12m or future 3m with PCP    Date Provider   Last Visit   12/1/2023 MARILYNN Plummer MD   Next Visit   2/13/2024 MARILYNN Plummer MD   ED visits in past 90 days: 0        Note composed:2:36 PM 01/30/2024

## 2024-01-30 NOTE — TELEPHONE ENCOUNTER
No care due was identified.  Health [facility] Embedded Care Due Messages. Reference number: [id number].   1/30/2024 2:06:11 PM CST

## 2024-01-31 ENCOUNTER — TELEPHONE (OUTPATIENT)
Dept: OBSTETRICS AND GYNECOLOGY | Facility: CLINIC | Age: 69
End: 2024-01-31
Payer: MEDICARE

## 2024-01-31 ENCOUNTER — NURSE TRIAGE (OUTPATIENT)
Dept: ADMINISTRATIVE | Facility: CLINIC | Age: 69
End: 2024-01-31
Payer: MEDICARE

## 2024-01-31 ENCOUNTER — OFFICE VISIT (OUTPATIENT)
Dept: OBSTETRICS AND GYNECOLOGY | Facility: CLINIC | Age: 69
End: 2024-01-31
Payer: MEDICARE

## 2024-01-31 VITALS
DIASTOLIC BLOOD PRESSURE: 76 MMHG | SYSTOLIC BLOOD PRESSURE: 136 MMHG | HEIGHT: 64 IN | BODY MASS INDEX: 24.32 KG/M2 | WEIGHT: 142.44 LBS

## 2024-01-31 DIAGNOSIS — N81.10 VAGINAL PROLAPSE: Primary | ICD-10-CM

## 2024-01-31 PROCEDURE — 3078F DIAST BP <80 MM HG: CPT | Mod: CPTII,S$GLB,, | Performed by: OBSTETRICS & GYNECOLOGY

## 2024-01-31 PROCEDURE — 3008F BODY MASS INDEX DOCD: CPT | Mod: CPTII,S$GLB,, | Performed by: OBSTETRICS & GYNECOLOGY

## 2024-01-31 PROCEDURE — 1159F MED LIST DOCD IN RCRD: CPT | Mod: CPTII,S$GLB,, | Performed by: OBSTETRICS & GYNECOLOGY

## 2024-01-31 PROCEDURE — 3075F SYST BP GE 130 - 139MM HG: CPT | Mod: CPTII,S$GLB,, | Performed by: OBSTETRICS & GYNECOLOGY

## 2024-01-31 PROCEDURE — 99213 OFFICE O/P EST LOW 20 MIN: CPT | Mod: S$GLB,,, | Performed by: OBSTETRICS & GYNECOLOGY

## 2024-01-31 PROCEDURE — 1126F AMNT PAIN NOTED NONE PRSNT: CPT | Mod: CPTII,S$GLB,, | Performed by: OBSTETRICS & GYNECOLOGY

## 2024-01-31 PROCEDURE — 1160F RVW MEDS BY RX/DR IN RCRD: CPT | Mod: CPTII,S$GLB,, | Performed by: OBSTETRICS & GYNECOLOGY

## 2024-01-31 PROCEDURE — 99999 PR PBB SHADOW E&M-EST. PATIENT-LVL IV: CPT | Mod: PBBFAC,,, | Performed by: OBSTETRICS & GYNECOLOGY

## 2024-01-31 NOTE — TELEPHONE ENCOUNTER
Spoke with patient who states she has a vaginal protrusion that cannot be pushed back in.  Patient states her stool has been soft but she has found herself straining recently. Advised that patient go to office now.  Patient noticed this today when she was taking a shower. Advised that patient go to office for evaluation.  Patient verbalized understanding.  Appointment was made for 2:45 pm with Dr. Doe (OB-GYN) by Erika in scheduling. Advised patient to callback with worsening symptoms.   Reason for Disposition   Something is hanging out of the vagina and can't easily be pushed back inside    Additional Information   Negative: Sounds like a life-threatening emergency to the triager   Negative: Patient sounds very sick or weak to the triager   Negative: SEVERE pain and not improved 2 hours after pain medicine   Negative: Genital area looks infected (e.g., draining sore, spreading redness) and fever    Protocols used: Vaginal Symptoms-A-OH

## 2024-01-31 NOTE — PROGRESS NOTES
Subjective:      Patient ID: Mone Best is a 68 y.o. female.    Chief Complaint:  Consult      History of Present Illness  HPI  Pt reports noting a bulge coming out of her vagina after showering this AM.  Pt became very nervous as she has never experienced this before.  Pt is concerned that she may have vaginal prolapse.      GYN & OB History  No LMP recorded. Patient has had a hysterectomy.   Date of Last Pap: No result found    OB History    Para Term  AB Living   2 2 2     2   SAB IAB Ectopic Multiple Live Births           2      # Outcome Date GA Lbr Ga/2nd Weight Sex Delivery Anes PTL Lv   2 Term         ANATOLIY   1 Term         ANATOLIY       Review of Systems  Review of Systems   Constitutional:  Negative for activity change, appetite change, chills, fatigue, fever and unexpected weight change.   Respiratory:  Negative for shortness of breath.    Cardiovascular:  Negative for chest pain, palpitations and leg swelling.   Gastrointestinal:  Positive for constipation and diarrhea. Negative for abdominal pain, bloating, blood in stool, nausea and vomiting.   Genitourinary:  Positive for frequency, hot flashes, urgency and vaginal pain. Negative for dyspareunia, dysuria, flank pain, genital sores, hematuria, pelvic pain, vaginal bleeding, vaginal discharge, urinary incontinence, vaginal dryness and vaginal odor.   Musculoskeletal:  Positive for back pain.   Neurological:  Negative for syncope and headaches.          Objective:     Physical Exam:   Constitutional: She is oriented to person, place, and time. She appears well-developed and well-nourished. No distress.       Cardiovascular:  Normal rate, regular rhythm and normal heart sounds.             Pulmonary/Chest: Effort normal and breath sounds normal.        Abdominal: Soft. Bowel sounds are normal. She exhibits no distension. There is no abdominal tenderness.     Genitourinary:    Vagina, right adnexa and left adnexa normal.      Pelvic exam  was performed with patient supine.   There is no rash, tenderness, lesion or injury on the right labia. There is no rash, tenderness, lesion or injury on the left labia. Right adnexum displays no mass, no tenderness and no fullness. Left adnexum displays no mass, no tenderness and no fullness. There is rectocele (Grade 3) and cystocele (Grade 2) in the vagina. No erythema, vaginal discharge, tenderness or bleeding in the vagina.    No foreign body in the vagina.      No signs of injury in the vagina.   Cervix is absent.Uterus is absent.    Genitourinary Comments: All measurements obtained at rest and under valsalva             Musculoskeletal: Normal range of motion and moves all extremeties. No tenderness or edema.       Neurological: She is alert and oriented to person, place, and time.    Skin: Skin is warm and dry.    Psychiatric: She has a normal mood and affect. Her behavior is normal. Thought content normal.         Assessment:     1. Vaginal prolapse               Plan:     Vaginal prolapse  -     Ambulatory referral/consult to Urogynecology; Future; Expected date: 02/07/2024  -     Pt with prior history of hysterectomy with BSO followed by A+P repair.  Now with recurrence of vaginal prolapse.  Pt counseled on options, including expectant vs pessary vs surgical.  Pt voiced understanding and desires referral to Urogyn in [place].       Follow up if symptoms worsen or fail to improve.

## 2024-01-31 NOTE — TELEPHONE ENCOUNTER
No care due was identified.  Health [facility] Embedded Care Due Messages. Reference number: [id number].   1/30/2024 6:08:41 PM CST

## 2024-01-31 NOTE — TELEPHONE ENCOUNTER
Per Dian Eric-  Appt made for pt. . Triage nurse called and stated that patient has a vaginal protrusion she is unable to push back.  Scheduled with Nader Mckeon same day access    Renate LEGER LPN  OB/GYN

## 2024-01-31 NOTE — TELEPHONE ENCOUNTER
----- Message from Erika Eric sent at 1/31/2024  9:55 AM CST -----  Regarding: pt called  Name of Who is Calling: AMY GALLEGOS [[id number]]      What is the request in detail: I made an appt for the pt to be seen today by Dr De Anda . Triage nurse called and stated that patient has a vaginal protrusion she is unable to push back. I made an appt for 2:45 today with the office. Please advise       Can the clinic reply by [facility]: No      What Number [facility] Call Back [facility]:Telephone Information:         [phone]

## 2024-01-31 NOTE — TELEPHONE ENCOUNTER
Refill Routing Note   Medication(s) are not appropriate for processing by [facility] for the following reason(s):        New or recently adjusted medication    [facility] action(s):  Defer               Appointments  past 12m or future 3m with PCP    Date Provider   Last Visit   12/1/2023 MARILYNN Plummer MD   Next Visit   2/13/2024 MARILYNN Plummer MD   ED visits in past 90 days: 0        Note composed:7:56 AM 01/31/2024

## 2024-02-02 ENCOUNTER — PATIENT MESSAGE (OUTPATIENT)
Dept: OBSTETRICS AND GYNECOLOGY | Facility: CLINIC | Age: 69
End: 2024-02-02
Payer: MEDICARE

## 2024-02-02 ENCOUNTER — OFFICE VISIT (OUTPATIENT)
Dept: INTERNAL MEDICINE | Facility: CLINIC | Age: 69
End: 2024-02-02
Payer: MEDICARE

## 2024-02-02 VITALS
WEIGHT: 142.88 LBS | HEART RATE: 78 BPM | TEMPERATURE: 97 F | DIASTOLIC BLOOD PRESSURE: 88 MMHG | HEIGHT: 64 IN | BODY MASS INDEX: 24.39 KG/M2 | SYSTOLIC BLOOD PRESSURE: 120 MMHG

## 2024-02-02 DIAGNOSIS — M85.80 OSTEOPENIA, UNSPECIFIED LOCATION: ICD-10-CM

## 2024-02-02 DIAGNOSIS — K57.90 DIVERTICULOSIS: ICD-10-CM

## 2024-02-02 DIAGNOSIS — E78.2 MODERATE MIXED HYPERLIPIDEMIA NOT REQUIRING STATIN THERAPY: Chronic | ICD-10-CM

## 2024-02-02 DIAGNOSIS — M54.50 CHRONIC BILATERAL LOW BACK PAIN WITHOUT SCIATICA: Chronic | ICD-10-CM

## 2024-02-02 DIAGNOSIS — K59.09 CONSTIPATION, CHRONIC: ICD-10-CM

## 2024-02-02 DIAGNOSIS — G89.29 CHRONIC BILATERAL LOW BACK PAIN WITHOUT SCIATICA: Chronic | ICD-10-CM

## 2024-02-02 DIAGNOSIS — E55.9 VITAMIN D DEFICIENCY: ICD-10-CM

## 2024-02-02 DIAGNOSIS — I34.0 NONRHEUMATIC MITRAL VALVE REGURGITATION: ICD-10-CM

## 2024-02-02 DIAGNOSIS — R92.8 ABNORMAL MAMMOGRAM OF LEFT BREAST: ICD-10-CM

## 2024-02-02 DIAGNOSIS — E03.9 ACQUIRED HYPOTHYROIDISM: Chronic | ICD-10-CM

## 2024-02-02 DIAGNOSIS — Z00.00 ENCOUNTER FOR PREVENTIVE HEALTH EXAMINATION: Primary | ICD-10-CM

## 2024-02-02 DIAGNOSIS — Z86.010 HISTORY OF COLONIC POLYPS: ICD-10-CM

## 2024-02-02 DIAGNOSIS — N81.10 VAGINAL PROLAPSE: ICD-10-CM

## 2024-02-02 DIAGNOSIS — I47.10 PSVT (PAROXYSMAL SUPRAVENTRICULAR TACHYCARDIA): Chronic | ICD-10-CM

## 2024-02-02 PROCEDURE — 99999 PR PBB SHADOW E&M-EST. PATIENT-LVL IV: CPT | Mod: PBBFAC,,, | Performed by: NURSE PRACTITIONER

## 2024-02-02 PROCEDURE — 3288F FALL RISK ASSESSMENT DOCD: CPT | Mod: CPTII,S$GLB,, | Performed by: NURSE PRACTITIONER

## 2024-02-02 PROCEDURE — 1160F RVW MEDS BY RX/DR IN RCRD: CPT | Mod: CPTII,S$GLB,, | Performed by: NURSE PRACTITIONER

## 2024-02-02 PROCEDURE — 1101F PT FALLS ASSESS-DOCD LE1/YR: CPT | Mod: CPTII,S$GLB,, | Performed by: NURSE PRACTITIONER

## 2024-02-02 PROCEDURE — 1159F MED LIST DOCD IN RCRD: CPT | Mod: CPTII,S$GLB,, | Performed by: NURSE PRACTITIONER

## 2024-02-02 PROCEDURE — 3074F SYST BP LT 130 MM HG: CPT | Mod: CPTII,S$GLB,, | Performed by: NURSE PRACTITIONER

## 2024-02-02 PROCEDURE — 1170F FXNL STATUS ASSESSED: CPT | Mod: CPTII,S$GLB,, | Performed by: NURSE PRACTITIONER

## 2024-02-02 PROCEDURE — G0439 PPPS, SUBSEQ VISIT: HCPCS | Mod: S$GLB,,, | Performed by: NURSE PRACTITIONER

## 2024-02-02 PROCEDURE — 3079F DIAST BP 80-89 MM HG: CPT | Mod: CPTII,S$GLB,, | Performed by: NURSE PRACTITIONER

## 2024-02-02 NOTE — PROGRESS NOTES
"  Mone Best presented for a  Medicare AWV and comprehensive Health Risk Assessment today. The following components were reviewed and updated:  Patient accompanied by  , who was present throughout the visit and aided in information gathering.        Medical history  Family History  Social history  Allergies and Current Medications  Health Risk Assessment  Health Maintenance  Care Team         ** See Completed Assessments for Annual Wellness Visit within the encounter summary.**         The following assessments were completed:  Living Situation  CAGE  Depression Screening  Timed Get Up and Go  Whisper Test  Cognitive Function Screening  Nutrition Screening  ADL Screening  PAQ Screening        Vitals:    02/02/24 1308   BP: 120/88   Pulse: 78   Temp: 97.4 °F (36.3 °C)   Weight: 64.8 kg (142 lb 13.7 oz)   Height: 5' 4" (1.626 m)     Body mass index is 24.52 kg/m².  Physical Exam  Vitals and nursing note reviewed.   Constitutional:       Appearance: Normal appearance. She is well-developed.   HENT:      Head: Normocephalic and atraumatic.   Eyes:      Pupils: Pupils are equal, round, and reactive to light.   Neck:      Vascular: No carotid bruit.   Cardiovascular:      Rate and Rhythm: Normal rate and regular rhythm.      Pulses: Normal pulses.      Heart sounds: Murmur heard.      No gallop.   Pulmonary:      Effort: Pulmonary effort is normal.      Breath sounds: Normal breath sounds.   Abdominal:      General: Bowel sounds are normal. There is no distension.      Palpations: Abdomen is soft.      Tenderness: There is no abdominal tenderness.   Musculoskeletal:         General: No tenderness. Normal range of motion.   Skin:     General: Skin is warm and dry.   Neurological:      Mental Status: She is alert.      Motor: No abnormal muscle tone.   Psychiatric:         Speech: Speech normal.         Behavior: Behavior normal.         Thought Content: Thought content normal.         Judgment: Judgment " normal.               Current Outpatient Medications:     azelastine (ASTELIN) 137 mcg (0.1 %) nasal spray, 2 sprays (274 mcg total) by Nasal route 2 (two) times daily., Disp: 60 mL, Rfl: 11    cetirizine (ZYRTEC) 10 MG tablet, Take 1 tablet (10 mg total) by mouth once daily., Disp: , Rfl:     fluticasone propionate (FLONASE) 50 mcg/actuation nasal spray, 2 sprays (100 mcg total) by Each Nostril route once daily., Disp: , Rfl:     levothyroxine (SYNTHROID) 50 MCG tablet, Take 1 tablet (50 mcg total) by mouth before breakfast., Disp: 90 tablet, Rfl: 0    montelukast (SINGULAIR) 10 mg tablet, Take 1 tablet (10 mg total) by mouth every evening., Disp: 90 tablet, Rfl: 3    multivitamin (THERAGRAN) per tablet, Take 1 tablet by mouth 2 (two) times daily., Disp: , Rfl:     polyethylene glycol (GLYCOLAX) 17 gram/dose powder, Take 17 g by mouth once daily., Disp: , Rfl:     propranoloL (INDERAL) 20 MG tablet, Take 1 tablet (20 mg total) by mouth 2 (two) times daily as needed (for heart palpitaitons)., Disp: 90 tablet, Rfl: 2    tolterodine (DETROL LA) 4 MG 24 hr capsule, TAKE 1 CAPSULE BY MOUTH ONCE  DAILY, Disp: 90 capsule, Rfl: 3    TURMERIC (CURCUMIN MISC), Take by mouth once daily., Disp: , Rfl:     calcium carbonate (OS-JUANITO) 500 mg calcium (1,250 mg) tablet, Take 2 tablets (1,000 mg total) by mouth once daily., Disp: 180 tablet, Rfl: 4    liothyronine (CYTOMEL) 5 MCG Tab, Take 1 tablet (5 mcg total) by mouth 2 (two) times a day., Disp: 180 tablet, Rfl: 2    Diagnoses and health risks identified today and associated recommendations/orders:    1. Encounter for preventive health examination  Review for Opioid Screening: Patient does not have rx for Opioids.  Review for Substance Use Disorders: Patient does not use substance.    Decline vaccines    2. PSVT (paroxysmal supraventricular tachycardia)  Chronic/ Monitored/Stable on  Inderal as directed.  Followed by cardiologist    3. Non rheumatic mitral valve regurgitation  echo 2/2022  The left ventricle is normal in size with concentric remodeling and normal systolic function.  Mild mitral regurgitation.  The estimated ejection fraction is 60%.  Normal left ventricular diastolic function.  Normal right ventricular size with normal right ventricular systolic function.  Normal central venous pressure (3 mmHg).  The estimated PA systolic pressure is 28 mmHg.  Chronic/ Monitored/Stable on  Inderal as directed.  Followed by cardiologist    4. Acquired hypothyroidism  Chronic/ Monitored/Stable on  Synthriod /Cytomel as directed. Scheduled for TSH-states occas hot/cold feelings  Followed by PCP    5. Moderate mixed hyperlipidemia not requiring statin therapy  Chronic/ Monitored/Stable diet, exercise.  Followed by PCP    6. Osteopenia, unspecified location  Recommend vitamin D/cailcum daily- UTD DEXA    7. Vitamin D deficiency  Recommend vitamin D/calcium daily- UTD DEXA    8. Chronic bilateral low back pain without sciatica  Chronic/ Monitored/Stable on Tylenol as  needed      9. Diverticulosis  Colonoscopy    Due date: 12/16/2025   Last completion date: 12/16/2022    Frequency: Every 3 Years      States occasional abd pain- follow up GI    10. Constipation, chronic  Chronic/ Monitored/Stable on  Mirlax as directed.  Followed by PCP    11. Abnormal mammogram of left breast  11/2023  Breast, left, 3 o'clock position subareolar, ultrasound guided biopsy:  Fragments of benign breast parenchyma along with fragments of a cyst wall negative for atypia or malignancy   Schedule repeat Breast u/s .Followed by breast specialist    12. History of colonic polyps  Repeat colonoscopy 12/16/ 2025     13.Vaginal prolapse    Scheduled to see uro-gyn 4/2024 but has appt [facility]     Provided Mone with a 5-10 year written screening schedule and personal prevention plan. Recommendations were developed using the USPSTF age appropriate recommendations. Education, counseling, and referrals were  provided as needed. After Visit Summary printed and given to patient which includes a list of additional screenings\tests needed.    Follow up in about 6 months (around 8/2/2024) for Follow up with PCP.    Arlene Slade NP

## 2024-02-02 NOTE — PATIENT INSTRUCTIONS
Counseling and Referral of Other Preventative  (Italic type indicates deductible and co-insurance are waived)    Patient Name: Mone Best  Today's Date: 2/2/2024    Health Maintenance       Date Due Completion Date    Aspirin/Antiplatelet Therapy Never done ---    RSV Vaccine (Age 60+ and Pregnant patients) (1 - 1-dose 60+ series) Never done ---    Influenza Vaccine (1) 09/01/2023 9/26/2022    COVID-19 Vaccine (4 - 2023-24 season) 09/01/2023 11/5/2021    Hemoglobin A1c (Prediabetes) 10/21/2023 10/21/2022    DEXA Scan 10/12/2024 10/12/2021    Mammogram 11/08/2024 11/8/2023    Colorectal Cancer Screening 12/16/2025 12/16/2022    Lipid Panel 10/21/2027 10/21/2022    TETANUS VACCINE 03/10/2030 3/10/2020        No orders of the defined types were placed in this encounter.    The following information is provided to all patients.  This information is to help you find resources for any of the problems found today that may be affecting your health:                  Living healthy guide: www.[facility].[place].gov      Understanding Diabetes: www.diabetes.org      Eating healthy: www.cdc.gov/healthyweight      CDC home safety checklist: www.cdc.gov/steadi/patient.html      Agency on Aging: www.goea.[place].[facility]      Alcoholics anonymous (AA): www.aa.org      Physical Activity: www.jose.nih.gov/xc0bxws      Tobacco use: www.quitwithusla.org

## 2024-02-05 ENCOUNTER — PATIENT MESSAGE (OUTPATIENT)
Dept: OBSTETRICS AND GYNECOLOGY | Facility: CLINIC | Age: 69
End: 2024-02-05
Payer: MEDICARE

## 2024-02-05 RX ORDER — LEVOTHYROXINE SODIUM 50 UG/1
50 TABLET ORAL
Qty: 90 TABLET | Refills: 0 | Status: SHIPPED | OUTPATIENT
Start: 2024-02-05 | End: 2024-02-13 | Stop reason: SDUPTHER

## 2024-02-05 NOTE — TELEPHONE ENCOUNTER
REFILL APPROVED. Will address further refills at upcoming appointment with me listed below.  #LMRX   --------------------------------  Future Appointments   Date Time Provider Department Center   2/6/2024 10:10 AM LABORATORY, [facility] HG LAB [facility]   2/13/2024  1:40 PM MARILYNN Plummer MD [facility] IM [facility]   4/16/2024 11:00 AM Savage Glasgow DO [facility] UROGYN [facility]   5/7/2024 11:00 AM HGV MAMMO2-DX [facility] MAMMO [facility]   5/7/2024 11:45 AM HG US1 [facility] ULSOUND [facility]   5/10/2024 12:15 PM Ratna Askew MD [facility] URO [facility]

## 2024-02-06 ENCOUNTER — LAB VISIT (OUTPATIENT)
Dept: LAB | Facility: HOSPITAL | Age: 69
End: 2024-02-06
Attending: FAMILY MEDICINE
Payer: MEDICARE

## 2024-02-06 DIAGNOSIS — E03.9 ACQUIRED HYPOTHYROIDISM: Chronic | ICD-10-CM

## 2024-02-06 DIAGNOSIS — Z13.1 SCREENING FOR DIABETES MELLITUS: ICD-10-CM

## 2024-02-06 DIAGNOSIS — E05.80 IATROGENIC HYPERTHYROIDISM: ICD-10-CM

## 2024-02-06 DIAGNOSIS — Z86.39 HISTORY OF METABOLIC DISORDER: ICD-10-CM

## 2024-02-06 LAB
ESTIMATED AVG GLUCOSE: 100 MG/DL (ref 68–131)
HBA1C MFR BLD: 5.1 % (ref 4–5.6)
TSH SERPL DL<=0.005 MIU/L-ACNC: 1.46 UIU/ML (ref 0.4–4)

## 2024-02-06 PROCEDURE — 83036 HEMOGLOBIN GLYCOSYLATED A1C: CPT | Performed by: FAMILY MEDICINE

## 2024-02-06 PROCEDURE — 36415 COLL VENOUS BLD VENIPUNCTURE: CPT | Performed by: FAMILY MEDICINE

## 2024-02-06 PROCEDURE — 84443 ASSAY THYROID STIM HORMONE: CPT | Performed by: FAMILY MEDICINE

## 2024-02-06 NOTE — PROGRESS NOTES
Updated referral information for patient to use as external referral for Gerald Valiente, external referral faxed to office via [employer] at [phone].

## 2024-02-09 RX ORDER — LEVOTHYROXINE SODIUM 50 UG/1
50 TABLET ORAL
Qty: 90 TABLET | Refills: 1 | OUTPATIENT
Start: 2024-02-09 | End: 2025-02-08

## 2024-02-09 NOTE — TELEPHONE ENCOUNTER
Response to request for refill of this medicine handled separately.  Requested Prescriptions   Pending Prescriptions Disp Refills    levothyroxine (SYNTHROID) 50 MCG tablet 90 tablet 1     Sig: Take 1 tablet (50 mcg total) by mouth before breakfast.

## 2024-02-13 ENCOUNTER — OFFICE VISIT (OUTPATIENT)
Dept: INTERNAL MEDICINE | Facility: CLINIC | Age: 69
End: 2024-02-13
Payer: MEDICARE

## 2024-02-13 DIAGNOSIS — N81.10 VAGINAL PROLAPSE: Chronic | ICD-10-CM

## 2024-02-13 DIAGNOSIS — N95.1 MENOPAUSAL VASOMOTOR SYNDROME: Chronic | ICD-10-CM

## 2024-02-13 DIAGNOSIS — M25.551 RIGHT HIP PAIN: Primary | ICD-10-CM

## 2024-02-13 DIAGNOSIS — R00.2 PALPITATIONS: Chronic | ICD-10-CM

## 2024-02-13 DIAGNOSIS — E55.9 VITAMIN D DEFICIENCY: Chronic | ICD-10-CM

## 2024-02-13 DIAGNOSIS — N95.1 MENOPAUSAL STATE: Chronic | ICD-10-CM

## 2024-02-13 DIAGNOSIS — E03.9 ACQUIRED HYPOTHYROIDISM: Chronic | ICD-10-CM

## 2024-02-13 DIAGNOSIS — J30.2 SEASONAL ALLERGIC RHINITIS, UNSPECIFIED TRIGGER: Chronic | ICD-10-CM

## 2024-02-13 PROCEDURE — 1159F MED LIST DOCD IN RCRD: CPT | Mod: CPTII,95,, | Performed by: FAMILY MEDICINE

## 2024-02-13 PROCEDURE — 3044F HG A1C LEVEL LT 7.0%: CPT | Mod: CPTII,95,, | Performed by: FAMILY MEDICINE

## 2024-02-13 PROCEDURE — 99215 OFFICE O/P EST HI 40 MIN: CPT | Mod: 95,,, | Performed by: FAMILY MEDICINE

## 2024-02-13 PROCEDURE — 1160F RVW MEDS BY RX/DR IN RCRD: CPT | Mod: CPTII,95,, | Performed by: FAMILY MEDICINE

## 2024-02-13 RX ORDER — PROPRANOLOL HYDROCHLORIDE 20 MG/1
20 TABLET ORAL 2 TIMES DAILY PRN
Qty: 90 TABLET | Refills: 2 | Status: SHIPPED | OUTPATIENT
Start: 2024-02-13

## 2024-02-13 RX ORDER — ESTRADIOL 0.5 MG/1
0.5 TABLET ORAL DAILY
Qty: 90 TABLET | Refills: 0 | Status: SHIPPED | OUTPATIENT
Start: 2024-02-13 | End: 2024-03-22

## 2024-02-13 RX ORDER — AZELASTINE 1 MG/ML
2 SPRAY, METERED NASAL 2 TIMES DAILY
Qty: 60 ML | Refills: 11 | Status: SHIPPED | OUTPATIENT
Start: 2024-02-13 | End: 2025-02-12

## 2024-02-13 RX ORDER — MULTIVITAMIN
1 TABLET ORAL 2 TIMES DAILY
Qty: 180 TABLET | Refills: 4 | Status: SHIPPED | OUTPATIENT
Start: 2024-02-13 | End: 2025-06-27

## 2024-02-13 RX ORDER — LEVOTHYROXINE SODIUM 50 UG/1
50 TABLET ORAL
Qty: 90 TABLET | Refills: 2 | Status: SHIPPED | OUTPATIENT
Start: 2024-02-13

## 2024-02-13 RX ORDER — LIOTHYRONINE SODIUM 5 UG/1
5 TABLET ORAL 2 TIMES DAILY
Qty: 180 TABLET | Refills: 2 | Status: SHIPPED | OUTPATIENT
Start: 2024-02-13

## 2024-02-13 RX ORDER — MONTELUKAST SODIUM 10 MG/1
10 TABLET ORAL NIGHTLY
Qty: 90 TABLET | Refills: 3 | Status: SHIPPED | OUTPATIENT
Start: 2024-02-13 | End: 2026-11-08

## 2024-02-13 NOTE — PROGRESS NOTES
TELEMEDICINE VIRTUAL VIDEO VISIT  2/13/24  1:40 PM CST    Visit Type: Audiovisual    Patient's Location: Mone represents that they are located within the state [facility].    CHIEF COMPLAINT: Multiple problems    SUBJECTIVE  Mone presents today for follow-up visit.    Mone discussed her recent labs results, highlighting a gradual increase in her A1C level from 4.8 to 5.1 over the past six years. Despite acknowledging this as a normal part of aging, she understands the importance of monitoring this.    Mone experienced an emergency visit about 1.5 weeks ago due to pelvic floor prolapse, resulting in limited exercise capability. Mainly discomfort related symptoms have been observed and she noted these have hindered her physical activities. She currently consults with specialists and has scheduled an appointment with a recommended urogynecologist, Dr. Thayer.    Mone experienced noticeable changes in body temperature, primarily fluctuations between hot and cold flashes, especially in the past month. She previously used estrogen therapy, but decided to discontinue it about eight to nine months ago.    She has chronic lower back pain attributed to arthritis, which is accompanied by discomfort in the right hip, especially during sleep. She noted stiffness in her body particularly after sleeping on her right side.    She is ongoing levothyroxine 50 mcg daily and liothyronine 5 mg twice daily for her hypothyroidism, for which her current TSH levels indicate effectiveness. Her treatment for seasonal allergic rhinitis includes Montelukast, Astelin nasal spray, and OTC Flonase and Zyrtec that have resulted in noticeable improvement. She has Propranolol as needed, which appears to effectively manage her palpitations.    She currently takes calcium and vitamin D supplements but avoids taking these with levothyroxine due to potential absorption interference. She inquires about the best timing for her supplements and was  advised to ideally take it twice daily for maximum absorption.      ROS  ROS is negative unless otherwise indicated in HPI.      OBJECTIVE  Constitutional: No acute distress. Normal appearance. Not ill-appearing.    Pulmonary: Pulmonary effort is normal. No respiratory distress.    Skin: Skin is not jaundiced.    Neurological: Alert. Mental status is at baseline.    Psychiatric: Mood normal. Behavior normal. Thought content normal.      ASSESSMENT   Right Hip Pain: Suspected greater trochanteric bursitis.   Menopausal Vasomotor Symptoms: With hot and cold flashes.   Vaginal Prolapse   Vitamin D Deficiency   Acquired Hypothyroidism   Seasonal Allergic Rhinitis   Benign Palpitations      PLAN  THYROID CONDITION:   Maintained the current dosage of levothyroxine 50 mcg daily and liothyronine 5 mg twice daily for the patient's thyroid condition.   Checked with the pharmacist about the potential interaction between levothyroxine and calcium.    MENOPAUSAL SYMPTOMS:   Prescribed a low dose of estradiol for the patient's menopausal symptoms.   Advised the patient to communicate with her gynecologist, Dr. Doe, if symptoms persist or if she decides to increase the dosage.    SEASONAL ALLERGIC RHINITIS:   Continue the patient's current medications, including Montelukast or Singulair, and Astelin nasal spray for her seasonal allergic rhinitis.    SUSPECTED TROCHANTERIC BURSITIS:   Ordered an x-ray to confirm the suspected diagnosis of trochanteric bursitis in the patient's right hip.   Educated the patient about the potential cause of her hip pain, explaining the condition of trochanteric bursitis.    VAGINAL PROLAPSE:   Scheduled the patient's next DEXA scan for October.   Added the patient to the waiting list for an earlier appointment with Dr. Thayer for her vaginal prolapse issue.   Referred the patient to Dr. Thayer for the same issue.    1. Right hip pain  -     X-Ray Hip 2 or 3 views Right (with Pelvis when performed);  Future; Expected date: 02/13/2024    2. Menopausal vasomotor syndrome  -     estradioL (ESTRACE) 0.5 MG tablet; Take 1 tablet (0.5 mg total) by mouth once daily.  Dispense: 90 tablet; Refill: 0    3. Menopausal state  -     calcium-vitamin D (CALCIUM WITH VITAMIN D) 600 mg-10 mcg (400 unit) Tab; Take 1 tablet by mouth 2 (two) times a day. for 999 doses  Dispense: 180 tablet; Refill: 4    4. Vaginal prolapse    5. Vitamin D deficiency  -     calcium-vitamin D (CALCIUM WITH VITAMIN D) 600 mg-10 mcg (400 unit) Tab; Take 1 tablet by mouth 2 (two) times a day. for 999 doses  Dispense: 180 tablet; Refill: 4    6. Acquired hypothyroidism  -     levothyroxine (SYNTHROID) 50 MCG tablet; Take 1 tablet (50 mcg total) by mouth before breakfast.  Dispense: 90 tablet; Refill: 2  -     liothyronine (CYTOMEL) 5 MCG Tab; Take 1 tablet (5 mcg total) by mouth 2 (two) times a day.  Dispense: 180 tablet; Refill: 2    7. Seasonal allergic rhinitis, unspecified trigger  -     montelukast (SINGULAIR) 10 mg tablet; Take 1 tablet (10 mg total) by mouth every evening.  Dispense: 90 tablet; Refill: 3  -     azelastine (ASTELIN) 137 mcg (0.1 %) nasal spray; 2 sprays (274 mcg total) by Nasal route 2 (two) times daily.  Dispense: 60 mL; Refill: 11    8. Benign palpitations  -     propranoloL (INDERAL) 20 MG tablet; Take 1 tablet (20 mg total) by mouth 2 (two) times daily as needed (for heart palpitaitons).  Dispense: 90 tablet; Refill: 2    Unless noted herein, any chronic conditions are represented as and appear stable, and no other significant complaints or concerns were reported.    Review of Systems   Constitutional:  Negative for activity change and unexpected weight change.   HENT:  Negative for hearing loss, rhinorrhea and trouble swallowing.    Eyes:  Negative for discharge and visual disturbance.   Respiratory:  Negative for chest tightness and wheezing.    Cardiovascular:  Negative for chest pain and palpitations.   Gastrointestinal:   "Negative for blood in stool, constipation, diarrhea and vomiting.   Endocrine: Negative for polydipsia and polyuria.   Genitourinary:  Negative for difficulty urinating, dysuria, hematuria and menstrual problem.   Musculoskeletal:  Positive for arthralgias. Negative for joint swelling and neck pain.   Neurological:  Negative for weakness and headaches.   Psychiatric/Behavioral:  Negative for confusion and dysphoric mood.        Physical Exam  Constitutional:       General: She is not in acute distress.     Appearance: Normal appearance. She is not ill-appearing.   Pulmonary:      Effort: Pulmonary effort is normal. No respiratory distress.   Skin:     Coloration: Skin is not jaundiced.   Neurological:      Mental Status: She is alert. Mental status is at baseline.   Psychiatric:         Mood and Affect: Mood normal.         Behavior: Behavior normal.         Thought Content: Thought content normal.         Follow up in about 6 months (around 8/13/2024) for periodic management of chronic medical conditions.     TOTAL TIME evaluating and managing this patient for this encounter was greater than or equal to 40 minutes.  This time was exclusive of any separately billable procedures for this patient and exclusive of time spent treating any other patient.  Documentation entered by me for this encounter may have been done in part using speech-recognition technology. Although I have made an effort to ensure accuracy, "sound like" errors may exist and should be interpreted in context.  Each patient to whom medical services are provided by telemedicine is: (1) informed of the relationship between the physician and patient and the respective role of any other health care provider with respect to management of the patient; and (2) notified that he or she may decline to receive medical services by telemedicine and may withdraw from such care at any time.  "

## 2024-02-21 ENCOUNTER — HOSPITAL ENCOUNTER (OUTPATIENT)
Dept: RADIOLOGY | Facility: HOSPITAL | Age: 69
Discharge: HOME | End: 2024-02-21
Attending: FAMILY MEDICINE
Payer: MEDICARE

## 2024-02-21 DIAGNOSIS — M25.551 RIGHT HIP PAIN: ICD-10-CM

## 2024-02-21 PROCEDURE — 73502 X-RAY EXAM HIP UNI 2-3 VIEWS: CPT | Mod: 26,RT,, | Performed by: RADIOLOGY

## 2024-02-21 PROCEDURE — 73502 X-RAY EXAM HIP UNI 2-3 VIEWS: CPT | Mod: TC,RT

## 2024-03-21 DIAGNOSIS — N95.1 MENOPAUSAL VASOMOTOR SYNDROME: Chronic | ICD-10-CM

## 2024-03-21 NOTE — TELEPHONE ENCOUNTER
No care due was identified.  [facility] Embedded Care Due Messages. Reference number: [id number].   3/21/2024 4:45:18 PM CDT

## 2024-03-22 RX ORDER — ESTRADIOL 0.5 MG/1
0.5 TABLET ORAL
Qty: 90 TABLET | Refills: 0 | Status: SHIPPED | OUTPATIENT
Start: 2024-03-22 | End: 2024-04-15 | Stop reason: SDUPTHER

## 2024-03-22 NOTE — TELEPHONE ENCOUNTER
Refill Routing Note   Medication(s) are not appropriate for processing by [facility] for the following reason(s):        New or recently adjusted medication    [facility] action(s):  Defer               Appointments  past 12m or future 3m with PCP    Date Provider   Last Visit   2/13/2024 MARILYNN Plummer MD   Next Visit   8/26/2024 MARILYNN Plummer MD   ED visits in past 90 days: 0        Note composed:7:41 AM 03/22/2024

## 2024-03-22 NOTE — TELEPHONE ENCOUNTER
REFILL REQUEST APPROVED  Requested Prescriptions   Pending Prescriptions Disp Refills    estradioL (ESTRACE) 0.5 MG tablet [Pharmacy Med Name: Estradiol 0.5 MG Oral Tablet] 90 tablet 3     Sig: TAKE 1 TABLET BY MOUTH ONCE  DAILY

## 2024-04-15 DIAGNOSIS — N95.1 MENOPAUSAL VASOMOTOR SYNDROME: Chronic | ICD-10-CM

## 2024-04-15 NOTE — TELEPHONE ENCOUNTER
Refill Routing Note   Medication(s) are not appropriate for processing by [facility] for the following reason(s):        New or recently adjusted medication    [facility] action(s):  Defer               Appointments  past 12m or future 3m with PCP    Date Provider   Last Visit   2/13/2024 MARILYNN Plummer MD   Next Visit   8/26/2024 MARILYNN Plummer MD   ED visits in past 90 days: 0        Note composed:4:43 PM 04/15/2024

## 2024-04-15 NOTE — TELEPHONE ENCOUNTER
No care due was identified.  [facility] Embedded Care Due Messages. Reference number: [id number].   4/15/2024 1:43:15 PM CDT

## 2024-04-16 ENCOUNTER — OFFICE VISIT (OUTPATIENT)
Dept: UROGYNECOLOGY | Facility: CLINIC | Age: 69
End: 2024-04-16
Payer: MEDICARE

## 2024-04-16 VITALS — WEIGHT: 143.31 LBS | BODY MASS INDEX: 24.46 KG/M2 | HEIGHT: 64 IN

## 2024-04-16 DIAGNOSIS — N95.2 ATROPHIC VAGINITIS: ICD-10-CM

## 2024-04-16 DIAGNOSIS — N39.46 MIXED URGE AND STRESS INCONTINENCE: Primary | ICD-10-CM

## 2024-04-16 DIAGNOSIS — N81.6 POSTERIOR VAGINAL WALL PROLAPSE: ICD-10-CM

## 2024-04-16 DIAGNOSIS — N81.9 VAGINAL VAULT PROLAPSE: ICD-10-CM

## 2024-04-16 PROCEDURE — 1159F MED LIST DOCD IN RCRD: CPT | Mod: CPTII,S$GLB,, | Performed by: OBSTETRICS & GYNECOLOGY

## 2024-04-16 PROCEDURE — 3008F BODY MASS INDEX DOCD: CPT | Mod: CPTII,S$GLB,, | Performed by: OBSTETRICS & GYNECOLOGY

## 2024-04-16 PROCEDURE — 99215 OFFICE O/P EST HI 40 MIN: CPT | Mod: S$GLB,,, | Performed by: OBSTETRICS & GYNECOLOGY

## 2024-04-16 PROCEDURE — 99999 PR PBB SHADOW E&M-EST. PATIENT-LVL IV: CPT | Mod: PBBFAC,,, | Performed by: OBSTETRICS & GYNECOLOGY

## 2024-04-16 PROCEDURE — 3044F HG A1C LEVEL LT 7.0%: CPT | Mod: CPTII,S$GLB,, | Performed by: OBSTETRICS & GYNECOLOGY

## 2024-04-16 PROCEDURE — 1126F AMNT PAIN NOTED NONE PRSNT: CPT | Mod: CPTII,S$GLB,, | Performed by: OBSTETRICS & GYNECOLOGY

## 2024-04-16 PROCEDURE — 3288F FALL RISK ASSESSMENT DOCD: CPT | Mod: CPTII,S$GLB,, | Performed by: OBSTETRICS & GYNECOLOGY

## 2024-04-16 PROCEDURE — 87086 URINE CULTURE/COLONY COUNT: CPT | Performed by: OBSTETRICS & GYNECOLOGY

## 2024-04-16 PROCEDURE — 1101F PT FALLS ASSESS-DOCD LE1/YR: CPT | Mod: CPTII,S$GLB,, | Performed by: OBSTETRICS & GYNECOLOGY

## 2024-04-16 RX ORDER — MIRABEGRON 50 MG/1
50 TABLET, FILM COATED, EXTENDED RELEASE ORAL NIGHTLY
Qty: 30 TABLET | Refills: 11 | Status: CANCELLED | OUTPATIENT
Start: 2024-04-16 | End: 2025-04-16

## 2024-04-16 RX ORDER — VIBEGRON 75 MG/1
1 TABLET, FILM COATED ORAL NIGHTLY
Qty: 90 TABLET | Refills: 3 | Status: SHIPPED | OUTPATIENT
Start: 2024-04-16

## 2024-04-16 NOTE — PROGRESS NOTES
Subjective:       Patient ID: Mone Best is a 68 y.o. female.    Chief Complaint:  Vaginal Prolapse, Urinary Incontinence, Urinary Urgency, and Urinary Frequency        History of Present Illness  HPI 68 y.o.  female    has a past medical history of Constipation, chronic, Diverticulosis, Glucose intolerance (impaired glucose tolerance) (5/3/2013), Hyperlipidemia (5/3/2013), Osteopenia, PSVT (paroxysmal supraventricular tachycardia), Stress incontinence, and Thyroid disease.  Referred by Dr. Doe for evaluation of prolapse.    She had a BO/ a suspension in the  with Dr. De Anda. In  she had an anterior and posterior colporrhapy with another provider. She did well until these past few years when the pelvic pressure has worsened.   [facility]    Question 2024 11:51 AM CDT - Filed by Savage Glasgow, DO   General Urogynecology: Are you experiencing the following?    Dysuria (painful urination) No   Nocturia:  waking up at night to empty your bladder No   If you answered yes to the previous question, how many times does this happen per night? 1-2   Enuresis (urine loss during sleep) No   Dribbling urine after you urinate No   Hematuria (urine appears red) No   Type of stream Weak   Urinary frequency: How often a day are you going to the bathroom per day? 10-15   Urinary Tract Infections: How many Urinary Tract Infections have you had in the past year? I have not had a UTI in the past year   If you have had a UTI in the past year, what treatments have you had so far? I have not had a UTI in the past year   Urinary Incontinence (General): Are you experiencing the following?    Past consultation for incontinence: Have you ever seen someone for the evaluation of incontinence? No   If you answered yes to the previous question, please select all the therapies you have tried. None of the above   Please note the effectiveness of the therapies. Ineffective   Need to wear protection to  "keep clothes dry Yes   If you answered yes to the previous question, please zach the protection you use. Poise   If you wear protection, how much wetness is typically on each pad? Slight   If you wear protection, how often do you have to change per day, if applicable? 3-4   Stress Symptoms: Are you experiencing the following?    Leakage of urine with cough, laugh and/or sneeze Yes   If you answered yes to the previous question, what is the frequency in days, weeks and/or months? Daily   Leakage of urine with sex No   Leakage of urine with bending/ lifting No   Leakage of urine with briskly walking or jogging No   If you lose urine for any other reason not previously mentioned, please note it below, if applicable.    Urge Symptoms: Are you experiencing the following?    Urgency ("got to go" feeling) Yes   Urge: How frequently do you feel an urge to urinate (feeling like you "gotta go" to the bathroom and can't wait) Daily   Do you experience a leakage of urine when you have a feeling of urgency? No   Leakage of urine when unaware No   Past use of anticholinergics (medications used to treat overactive bladder) No   If you answered yes to the previous question, please zach the anticholinergics you have used:    Have you ever used Mirbetriq (aka Mirabegron)? No   Prolapse Symptoms: Are you experiencing any of the following?    Falling out/ Bulging/ Heaviness in the vagina Yes   Vaginal/ Abdominal Pain/ Pressure Yes   Need to strain/ Push to void No   Need to wait on the toilet before you void No   Unusual position to urinate (using your hands to push back the vaginal bulge) No   Sensation of incomplete emptying Yes   Past use of pessary device No   If you answered yes to the previous question, please list the devices you have used below.    Bowel Symptoms: Are you experiencing any of the following?    Constipation Yes   Diarrhea Yes   Hematochezia (bloody stool) No   Incomplete evacuation of stool No   Involuntary loss " of formed stool No   Fecal smearing/urgency No   Involuntary loss of gas No   Vaginal Symptoms: Are you experiencing any of the following?    Abnormal vaginal bleeding Yes   Vaginal dryness No   Sexually active No   Dyspareunia (painful intercourse) Yes   Estrogen use Yes       GYN & OB History  No LMP recorded. Patient has had a hysterectomy.   Date of Last Pap: No result found    OB History    Para Term  AB Living   2 2 2     2   SAB IAB Ectopic Multiple Live Births           2      # Outcome Date GA Lbr Ga/2nd Weight Sex Type Anes PTL Lv   2 Term         ANATOLIY   1 Term         ANATOLIY         Past Medical History:   Diagnosis Date    Constipation, chronic     Diverticulosis     Glucose intolerance (impaired glucose tolerance) 5/3/2013    Hyperlipidemia 5/3/2013    Osteopenia     PSVT (paroxysmal supraventricular tachycardia)     Stress incontinence     Thyroid disease      Past Surgical History:   Procedure Laterality Date    APPENDECTOMY      BLADDER SUSPENSION      BREAST BIOPSY Right     fibroadenoma    BREAST BIOPSY Left 2023    benign    BREAST SURGERY      R breast biopsy- benign (removed fibroid adenoma)    COLONOSCOPY N/A 2022    Procedure: COLONOSCOPY;  Surgeon: Tad Adames MD;  Location: [facility];  Service: General;  Laterality: N/A;    COLONOSCOPY W/ BIOPSIES      polyps were negative    HYSTERECTOMY      OOPHORECTOMY      TONSILLECTOMY      VAGINAL HYSTERECTOMY W/ ANTERIOR AND POSTERIOR VAGINAL REPAIR         Review of Systems  Review of Systems   Constitutional: Negative.  Negative for activity change, appetite change, chills, diaphoresis, fatigue, fever and unexpected weight change.   HENT: Negative.     Eyes: Negative.    Respiratory: Negative.  Negative for cough.    Cardiovascular: Negative.    Gastrointestinal:  Positive for constipation. Negative for abdominal distention, abdominal pain, anal bleeding, blood in stool, diarrhea, nausea, rectal pain and vomiting.    Endocrine: Negative.    Genitourinary:  Positive for difficulty urinating. Negative for decreased urine volume, dyspareunia, dysuria, enuresis, flank pain, frequency, genital sores, hematuria, menstrual problem, pelvic pain, urgency, vaginal bleeding, vaginal discharge and vaginal pain.        Prolapse  + vaginal bulge   +vaginal pressure    Musculoskeletal:  Negative for back pain.   Skin:  Negative for color change, pallor, rash and wound.   Allergic/Immunologic: Negative for environmental allergies, food allergies and immunocompromised state.   Hematological:  Negative for adenopathy. Does not bruise/bleed easily.   Psychiatric/Behavioral:  Negative for agitation, behavioral problems, confusion and sleep disturbance.            Objective:     Physical Exam   Constitutional: She is oriented to person, place, and time. She appears well-developed.   HENT:   Head: Normocephalic and atraumatic.   Eyes: Conjunctivae and EOM are normal.   Cardiovascular: Normal rate, regular rhythm, S1 normal, S2 normal, normal heart sounds and intact distal pulses.   Pulmonary/Chest: Effort normal and breath sounds normal. She exhibits no tenderness.   Abdominal: Soft. Bowel sounds are normal. She exhibits no distension and no mass. There is no splenomegaly or hepatomegaly. There is no abdominal tenderness. There is no rigidity, no rebound and no guarding. Hernia confirmed negative in the right inguinal area and confirmed negative in the left inguinal area.   Genitourinary: Pelvic exam was performed with patient supine. Rectum normal, vagina normal, skenes normal and bartholins normal. Right labia normal and left labia normal. Urethra exhibits hypermobility. Urethra exhibits no urethral caruncle, no urethral diverticulum and no urethral mass. Right bartholin is not enlarged and not tender. Left bartholin is not enlarged and not tender. Rectal exam shows resting tone normal and active tone normal. Rectal exam shows no external  hemorrhoid, no fissure, no tenderness, anal tone normal and no dovetailing. Guaiac negative stool. There is a rectocele, a cystocele, unspecified prolapse of vaginal walls and atrophy in the vagina. No foreign body, tenderness, bleeding, fistula, mesh exposure or lavator tenderness in the vagina. Right adnexum displays no tenderness. Left adnexum displays no tenderness. Uterus is absent.   PVR: 80 ML  Empty cough stress test: Negative.  Kegel: 2/5    POP-Q  Aa: -1 Ba: -1 C: 0   GH: 4 PB: 2 TVL: 5   Ap: 1 Bp: 1 D:                      Musculoskeletal:         General: Normal range of motion.      Cervical back: Normal range of motion and neck supple.   Neurological: She is alert and oriented to person, place, and time. She has normal strength, normal reflexes and intact cranial nerves (2-12). Cranial nerves II through XII intact. No cranial nerve deficit.   Skin: Skin is warm and dry.   Psychiatric: She has a normal mood and affect. Her speech is normal and behavior is normal. Judgment normal.                Assessment:        1. Mixed urge and stress incontinence    2. Vaginal vault prolapse    3. Atrophic vaginitis    4. Posterior vaginal wall prolapse                Plan:    1. Prolapse: Stage 2 apical prolapse, Stage 2 anterior and posterior vaginal wall prolapse   --Pessary benefit discussed with patient, declined pessary fitting   --Daily pelvic floor exercises as assigned, Pelvic floor PT   --Non surgical options discussed with patient    --Surgical options discussed such as :  with apical suspension: SSF, USLS and SCP with mesh augmentation.  The possible need for an anterior or posterior colporrhaphy was discussed.  We discussed pro's and con's of a native tissue repair vs an augmented repair with mesh. Risks/ benefits/ alternatives/ succuss and failure rates were reviewed with the various surgical treatment options. Information packets were given detailing surgical options.  She was also given web site  information for: www.augs.org and www.Hydrobeesforpfd.org and http://www.fda.gov/MedicalDevices/UroGynSurgicalMesh/default.htm to review the details of the surgical options discussed and the use of mesh in surgery. She will review the information given and we will discuss further at the follow up visit. She presents for a second opinion, I reviewed the prolapse as reviewed the non-surgical and surgical options. Since she has failed native tissue repair twice therefore would recommend sacrocolpopexy.     2.  Mixed urinary incontinence, urge > stress:   --Bladder diary for 3-7 days, write the number of times you go to the rest room and associated symptoms of urgency or leakage.   --Empty bladder every 3 hours.  Empty well: wait a minute, lean forward on toilet.    --Avoid dietary irritants (see sheet).  Keep diary x 3-5 days to determine your irritants.  --KEGELS: do 10 in AM and 10 in PM, holding each x 10 seconds.  When you feel urge to go, STOP, KEGEL, and when urge has passed, then go to bathroom.  Start pelvic floor PT.     --URGE:Gemessa 75 mg daily.  SE profile reviewed.    Takes 2-4 weeks to see if will have effect.  For dry mouth: get sour, sugar free lozenge or gum.    --STRESS:  Non surgical options: Pessary vs. Impressa vs Rivive - which represent urethral and bladder support devices; Surgical options including 1.  mid urethral sling; retropubic, transobturator vs single incision 2. Fascial slings 3. Hilton procedure 4. Periurethral bulking - SUDS    3. Vaginal atrophy (dryness):  instrarosa nightly     Approximately 45 minutes of total time spent in consult, 75 % in discussion. This includes face to face time and non-face to face time preparing to see the patient, obtaining and/or reviewing separately obtained history, documenting clinical information in the electronic or other health record, independently interpreting results (not separately reported) and communicating results to the  patient/family/caregiver, or care coordination (not separately reported).      Thank you for requesting consultation of your patient.  I look forward to participating in her care.    Savage Glasgow DO  Female Pelvic Medicine and Reconstructive Surgery  [facility] [place], [place]

## 2024-04-16 NOTE — PATIENT INSTRUCTIONS
1. Prolapse: Stage 2 apical prolapse, Stage 2 anterior and posterior vaginal wall prolapse   --Pessary benefit discussed with patient, pessary fitting   --Daily pelvic floor exercises as assigned, Pelvic floor PT   --Non surgical options discussed with patient    --Surgical options discussed such as :  with apical suspension: SSF, USLS and SCP with mesh augmentation.  The possible need for an anterior or posterior colporrhaphy was discussed.  We discussed pro's and con's of a native tissue repair vs an augmented repair with mesh. Risks/ benefits/ alternatives/ succuss and failure rates were reviewed with the various surgical treatment options. Information packets were given detailing surgical options.  She was also given web site information for: www.augs.org and www.[employer].org and http://www.fda.gov/MedicalDevices/UroGynSurgicalMesh/default.htm to review the details of the surgical options discussed and the use of mesh in surgery. She will review the information given and we will discuss further at the follow up visit. She has failed native tissue repair twice therefore would recommend sacrocolpopexy .     2.  Mixed urinary incontinence, urge > stress:   --Bladder diary for 3-7 days, write the number of times you go to the rest room and associated symptoms of urgency or leakage.   --Empty bladder every 3 hours.  Empty well: wait a minute, lean forward on toilet.    --Avoid dietary irritants (see sheet).  Keep diary x 3-5 days to determine your irritants.  --KEGELS: do 10 in AM and 10 in PM, holding each x 10 seconds.  When you feel urge to go, STOP, KEGEL, and when urge has passed, then go to bathroom.  Start pelvic floor PT.     --URGE:[id number] mg daily.  SE profile reviewed.    Takes 2-4 weeks to see if will have effect.  For dry mouth: get sour, sugar free lozenge or gum.    --STRESS:  Non surgical options: Pessary vs. Impressa vs Rivive - which represent urethral and bladder support devices; Surgical  options including 1.  mid urethral sling; retropubic, transobturator vs single incision 2. Fascial slings 3. Hilton procedure 4. Periurethral bulking     3. Vaginal atrophy (dryness):  instrarosa nightly

## 2024-04-17 RX ORDER — ESTRADIOL 0.5 MG/1
0.5 TABLET ORAL DAILY
Qty: 90 TABLET | Refills: 1 | Status: SHIPPED | OUTPATIENT
Start: 2024-04-17

## 2024-04-17 NOTE — TELEPHONE ENCOUNTER
REFILL APPROVED  Medications Ordered This Encounter   Medications    estradioL (ESTRACE) 0.5 MG tablet     Sig: Take 1 tablet (0.5 mg total) by mouth once daily.     Dispense:  90 tablet     Refill:  1

## 2024-04-18 LAB — BACTERIA UR CULT: NO GROWTH

## 2024-04-19 ENCOUNTER — PATIENT MESSAGE (OUTPATIENT)
Dept: UROGYNECOLOGY | Facility: CLINIC | Age: 69
End: 2024-04-19
Payer: MEDICARE

## 2024-05-07 ENCOUNTER — HOSPITAL ENCOUNTER (OUTPATIENT)
Dept: RADIOLOGY | Facility: HOSPITAL | Age: 69
Discharge: HOME | End: 2024-05-07
Attending: FAMILY MEDICINE
Payer: MEDICARE

## 2024-05-07 DIAGNOSIS — R92.8 ABNORMAL MAMMOGRAM: ICD-10-CM

## 2024-05-07 PROCEDURE — 77061 BREAST TOMOSYNTHESIS UNI: CPT | Mod: TC,LT

## 2024-05-07 PROCEDURE — 77065 DX MAMMO INCL CAD UNI: CPT | Mod: TC,LT

## 2024-05-07 PROCEDURE — 77061 BREAST TOMOSYNTHESIS UNI: CPT | Mod: 26,LT,, | Performed by: RADIOLOGY

## 2024-05-07 PROCEDURE — 77065 DX MAMMO INCL CAD UNI: CPT | Mod: 26,LT,, | Performed by: RADIOLOGY

## 2024-05-09 NOTE — PROGRESS NOTES
Mone Hinton.    I am happy to report that your recent breast imaging did NOT show evidence of cancer.    An annual mammogram is the best test to screen for breast cancer, but it is not perfect, and it can miss some cancers. So, even though your mammogram was normal, if you notice any lump or change in one of your breasts, please schedule an appointment with me for a proper evaluation.    Thanks for letting me care for you, and thanks for trusting [facility] with your healthcare needs.    Sincerely,    AFRICA Plummer MD

## 2024-06-11 ENCOUNTER — PATIENT MESSAGE (OUTPATIENT)
Dept: INTERNAL MEDICINE | Facility: CLINIC | Age: 69
End: 2024-06-11
Payer: MEDICARE

## 2024-06-17 ENCOUNTER — OFFICE VISIT (OUTPATIENT)
Dept: INTERNAL MEDICINE | Facility: CLINIC | Age: 69
End: 2024-06-17
Payer: MEDICARE

## 2024-06-17 ENCOUNTER — HOSPITAL ENCOUNTER (OUTPATIENT)
Dept: RADIOLOGY | Facility: HOSPITAL | Age: 69
Discharge: HOME | End: 2024-06-17
Attending: PHYSICIAN ASSISTANT
Payer: MEDICARE
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
## 2024-06-17 DIAGNOSIS — M25.551 RIGHT HIP PAIN: ICD-10-CM
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
## 2024-06-17 PROCEDURE — 1159F MED LIST DOCD IN RCRD: CPT | Mod: CPTII,95,, | Performed by: PHYSICIAN ASSISTANT

## 2024-06-17 PROCEDURE — 73502 X-RAY EXAM HIP UNI 2-3 VIEWS: CPT | Mod: TC,FY,PO,RT

## 2024-06-17 PROCEDURE — 3044F HG A1C LEVEL LT 7.0%: CPT | Mod: CPTII,95,, | Performed by: PHYSICIAN ASSISTANT

## 2024-06-17 PROCEDURE — 99213 OFFICE O/P EST LOW 20 MIN: CPT | Mod: 95,,, | Performed by: PHYSICIAN ASSISTANT

## 2024-06-17 PROCEDURE — 73502 X-RAY EXAM HIP UNI 2-3 VIEWS: CPT | Mod: 26,RT,, | Performed by: RADIOLOGY

## 2024-06-17 PROCEDURE — 1160F RVW MEDS BY RX/DR IN RCRD: CPT | Mod: CPTII,95,, | Performed by: PHYSICIAN ASSISTANT

## 2024-06-17 NOTE — PROGRESS NOTES
Subjective:      Patient ID: Mone Best is a 68 y.o. female.    Chief Complaint: No chief complaint on file.    Back Pain  This is a recurrent problem. The current episode started more than 1 month ago. The problem occurs daily. The problem has been waxing and waning since onset. The pain is present in the gluteal and sacro-iliac. The quality of the pain is described as aching, shooting and stabbing. The pain radiates to the right knee and right thigh. The pain is at a severity of 6/10. The pain is moderate. The pain is Worse during the day. The symptoms are aggravated by bending, sitting and twisting. Stiffness is present In the morning. Pertinent negatives include no abdominal pain, bladder incontinence, bowel incontinence, chest pain, dysuria, fever, headaches, leg pain, numbness, paresis, paresthesias, pelvic pain, perianal numbness, tingling, weakness or weight loss. The treatment provided mild relief.   Hip Pain   Incident onset: 2 months. The injury mechanism was a fall. The pain is present in the right hip. The quality of the pain is described as aching. Associated symptoms include an inability to bear weight. Pertinent negatives include no loss of motion, loss of sensation, muscle weakness, numbness or tingling. She reports no foreign bodies present. She has tried NSAIDs for the symptoms. The treatment provided mild relief.     Pain in right hip since a fall. Wakes her up in the middle of the night. Fall 2 months ago.   Pain when laying on that side or putting pressure on that side.   Uncomfortable with climbing stairs/walking but not severe pain. Worse with pressure to her hip.   No numbness, tingling, or weakness.   Going on a trip to [place] on Wednesday morning.     Patient Active Problem List   Diagnosis    PSVT (paroxysmal supraventricular tachycardia)    Diverticulosis    Constipation, chronic    Stress incontinence    Osteopenia    Moderate mixed hyperlipidemia not requiring statin therapy     Palpitations    Acquired hypothyroidism    History of colonic polyps    Vitamin D deficiency    Surgical menopause on hormone replacement therapy    SOLOMON (dyspnea on exertion)    Abnormal ECG    Nonrheumatic mitral valve regurgitation    Other fatigue    Seasonal allergic rhinitis    Juvenile idiopathic scoliosis of thoracic region    Chronic bilateral low back pain without sciatica    Anterolisthesis of lumbar spine    Pars defect of lumbar spine    Vaginal prolapse    Menopausal vasomotor syndrome    Vaginal vault prolapse    Atrophic vaginitis    Posterior vaginal wall prolapse         Current Outpatient Medications:     azelastine (ASTELIN) 137 mcg (0.1 %) nasal spray, 2 sprays (274 mcg total) by Nasal route 2 (two) times daily., Disp: 60 mL, Rfl: 11    calcium-vitamin D (CALCIUM WITH VITAMIN D) 600 mg-10 mcg (400 unit) Tab, Take 1 tablet by mouth 2 (two) times a day. for 999 doses, Disp: 180 tablet, Rfl: 4    cetirizine (ZYRTEC) 10 MG tablet, Take 1 tablet (10 mg total) by mouth once daily., Disp: , Rfl:     estradioL (ESTRACE) 0.5 MG tablet, Take 1 tablet (0.5 mg total) by mouth once daily., Disp: 90 tablet, Rfl: 1    fluticasone propionate (FLONASE) 50 mcg/actuation nasal spray, 2 sprays (100 mcg total) by Each Nostril route once daily., Disp: , Rfl:     levothyroxine (SYNTHROID) 50 MCG tablet, Take 1 tablet (50 mcg total) by mouth before breakfast., Disp: 90 tablet, Rfl: 2    liothyronine (CYTOMEL) 5 MCG Tab, Take 1 tablet (5 mcg total) by mouth 2 (two) times a day., Disp: 180 tablet, Rfl: 2    montelukast (SINGULAIR) 10 mg tablet, Take 1 tablet (10 mg total) by mouth every evening., Disp: 90 tablet, Rfl: 3    multivitamin (THERAGRAN) per tablet, Take 1 tablet by mouth 2 (two) times daily., Disp: , Rfl:     polyethylene glycol (GLYCOLAX) 17 gram/dose powder, Take 17 g by mouth once daily., Disp: , Rfl:     prasterone, dhea, (INTRAROSA) 6.5 mg Inst, Place 1 suppository vaginally nightly., Disp: 28 each,  Rfl: 11    propranoloL (INDERAL) 20 MG tablet, Take 1 tablet (20 mg total) by mouth 2 (two) times daily as needed (for heart palpitaitons)., Disp: 90 tablet, Rfl: 2    tolterodine (DETROL LA) 4 MG 24 hr capsule, TAKE 1 CAPSULE BY MOUTH ONCE  DAILY, Disp: 90 capsule, Rfl: 3    TURMERIC (CURCUMIN MISC), Take by mouth once daily., Disp: , Rfl:     vibegron (GEMTESA) 75 mg Tab, Take 1 tablet (75 mg total) by mouth nightly., Disp: 90 tablet, Rfl: 3    Review of Systems   Constitutional:  Negative for activity change, appetite change, chills, diaphoresis, fatigue, fever, unexpected weight change and weight loss.   HENT: Negative.  Negative for congestion, hearing loss, postnasal drip, rhinorrhea, sore throat, trouble swallowing and voice change.    Eyes: Negative.  Negative for visual disturbance.   Respiratory: Negative.  Negative for cough, choking, chest tightness and shortness of breath.    Cardiovascular:  Negative for chest pain, palpitations and leg swelling.   Gastrointestinal:  Negative for abdominal distention, abdominal pain, blood in stool, bowel incontinence, constipation, diarrhea, nausea and vomiting.   Endocrine: Negative for cold intolerance, heat intolerance, polydipsia and polyuria.   Genitourinary: Negative.  Negative for bladder incontinence, decreased urine volume, difficulty urinating, dysuria, enuresis, flank pain, frequency, genital sores, hematuria, menstrual problem, pelvic pain, urgency, vaginal bleeding, vaginal discharge and vaginal pain.   Musculoskeletal:  Positive for arthralgias, back pain, gait problem and myalgias. Negative for joint swelling, neck pain and neck stiffness.   Skin:  Negative for color change, pallor, rash and wound.   Neurological:  Negative for dizziness, tingling, tremors, weakness, light-headedness, numbness, headaches and paresthesias.   Hematological:  Negative for adenopathy.   Psychiatric/Behavioral:  Negative for behavioral problems, confusion, self-injury, sleep  disturbance and suicidal ideas. The patient is not nervous/anxious.      Objective:   There were no vitals taken for this visit.    Physical Exam  Constitutional:       General: She is not in acute distress.     Appearance: Normal appearance. She is not ill-appearing, toxic-appearing or diaphoretic.   HENT:      Head: Normocephalic and atraumatic.   Pulmonary:      Effort: Pulmonary effort is normal. No respiratory distress.      Breath sounds: Normal breath sounds.   Skin:     Coloration: Skin is not pale.      Findings: No bruising, erythema or rash.   Neurological:      Mental Status: She is alert and oriented to person, place, and time.   Psychiatric:         Mood and Affect: Mood normal.         Behavior: Behavior normal.         Thought Content: Thought content normal.         Judgment: Judgment normal.       Lab Results   Component Value Date    CREATININE 0.7 10/21/2022    BUN 17 10/21/2022     10/21/2022    K 4.3 10/21/2022     10/21/2022    CO2 26 10/21/2022       Assessment:     1. Fall, initial encounter    2. Right hip pain      Plan:   Fall, initial encounter  -     X-Ray Hip 2 or 3 views Right with Pelvis when performed; Future; Expected date: 06/17/2024  -     Ambulatory referral/consult to Orthopedics; Future; Expected date: 06/24/2024    Right hip pain  -     X-Ray Hip 2 or 3 views Right with Pelvis when performed; Future; Expected date: 06/17/2024  -     Ambulatory referral/consult to Orthopedics; Future; Expected date: 06/24/2024    -RICE  -cont alternating tylenol with ibuprofen/aleve      Follow up if symptoms worsen or fail to improve.

## 2024-07-19 ENCOUNTER — OFFICE VISIT (OUTPATIENT)
Dept: SPORTS MEDICINE | Facility: CLINIC | Age: 69
End: 2024-07-19
Payer: MEDICARE

## 2024-07-19 DIAGNOSIS — M53.3 SACROILIAC JOINT DYSFUNCTION OF RIGHT SIDE: ICD-10-CM
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
## 2024-07-19 DIAGNOSIS — M16.11 PRIMARY OSTEOARTHRITIS OF RIGHT HIP: ICD-10-CM

## 2024-07-19 DIAGNOSIS — M25.551 RIGHT HIP PAIN: ICD-10-CM

## 2024-07-19 PROCEDURE — 99999 PR PBB SHADOW E&M-EST. PATIENT-LVL IV: CPT | Mod: PBBFAC,,, | Performed by: STUDENT IN AN ORGANIZED HEALTH CARE EDUCATION/TRAINING PROGRAM

## 2024-07-19 NOTE — PATIENT INSTRUCTIONS
Assessment:  Mone Best is a 68 y.o. female with a chief complaint of Pain and Injury of the Right Hip    Encounter Diagnoses   Name Primary?    Fall, initial encounter     Right hip pain     Primary osteoarthritis of right hip Yes    Sacroiliac joint dysfunction of right side       Plan:  XR reviewed - mild degenerative changes about the right hip joint  Labs reviewed - A1c 5.1%, no renal dysfunction   History and clinical exam is consistent with chronic right hip pain due to underlying degenerative hip osteoarthritis, and SI joint dysfunction.  No indications of lumbar radiculopathy.  I do not believe this pain is significantly contributed to by underlying pelvic prolapse dysfunction.  Recommend conservative management.  [facility] - to include previous pelvic floor therapy, and incorporate hip and SI joint dysfunction rehab as well.  May consider for corticosteroid injection if not improving.    Follow-up:  As needed or sooner if there are any problems between now and then.    Thank you for choosing [facility] and Dr. Corby Willson for your orthopedic & sports medicine care. It is our goal to provide you with exceptional care that will help keep you healthy, active, and get you back in the game.    Please do not hesitate to reach out to us via email, phone, or MyChart with any questions, concerns, or feedback.    If you are experiencing pain/discomfort ,or have questions after 5pm and would like to be connected to the [facility] [place] on-call team, please call this number and specify which Sports Medicine provider is treating you: [phone]

## 2024-07-19 NOTE — PROGRESS NOTES
Patient ID: Mone Best  YOB: 1955  MRN: [id number]    Chief Complaint: Pain and Injury of the Right Hip    Referred By: RAHEEL Diaz    Occupation: Data Unavailable      History of Present Illness: Mone Best is a 68 y.o. female who presents today with Pain and Injury of the Right Hip    Patient is presenting approx. 5 months of right-sided hip pain.  She fell directly right hip and.  Initially with significant pain, thigh pain.  She is now more posterior and pain, which occasionally radiate all way down her.  Describes the pain as aching and stinging quality.  Has noted some associated intermittent tingling as well.  Pain is worse with walking, standing, or prolonged sitting.  She has tried ibuprofen and Tylenol, without much relief.  She has also tried topical, such as Biofreeze, and heat.  She is currently going through pelvic floor rehab at [facility] for uterine prolapse.    Past Medical History:   Past Medical History:   Diagnosis Date    Constipation, chronic     Diverticulosis     Glucose intolerance (impaired glucose tolerance) 5/3/2013    Hyperlipidemia 5/3/2013    Osteopenia     PSVT (paroxysmal supraventricular tachycardia)     Stress incontinence     Thyroid disease      Past Surgical History:   Procedure Laterality Date    APPENDECTOMY      BLADDER SUSPENSION      BREAST BIOPSY Right     fibroadenoma    BREAST BIOPSY Left 12/2023    benign    BREAST SURGERY      R breast biopsy- benign (removed fibroid adenoma)    COLONOSCOPY N/A 12/16/2022    Procedure: COLONOSCOPY;  Surgeon: Tad Adames MD;  Location: [facility];  Service: General;  Laterality: N/A;    COLONOSCOPY W/ BIOPSIES      polyps were negative    HYSTERECTOMY      OOPHORECTOMY      TONSILLECTOMY      VAGINAL HYSTERECTOMY W/ ANTERIOR AND POSTERIOR VAGINAL REPAIR       Family History   Problem Relation Name Age of Onset    Depression Mother gi bleed     Diabetes Mother gi bleed     Diabetes  Father      Hypertension Father      Melanoma Neg Hx       Social History     Socioeconomic History    Marital status:    Tobacco Use    Smoking status: Never     Passive exposure: Never    Smokeless tobacco: Never   Substance and Sexual Activity    Alcohol use: Yes     Alcohol/week: 3.0 standard drinks of alcohol     Types: 3 Glasses of wine per week    Drug use: No    Sexual activity: Yes     Partners: Male     Birth control/protection: See Surgical Hx     Comment: hyst     Social Determinants of Health     Financial Resource Strain: Low Risk  (2/2/2024)    Overall Financial Resource Strain (CARDIA)     Difficulty of Paying Living Expenses: Not hard at all   Food Insecurity: No Food Insecurity (2/2/2024)    Hunger Vital Sign     Worried About Running Out of Food in the Last Year: Never true     Ran Out of Food in the Last Year: Never true   Transportation Needs: No Transportation Needs (2/2/2024)    PRAPARE - Transportation     Lack of Transportation (Medical): No     Lack of Transportation (Non-Medical): No   Physical Activity: Inactive (5/16/2024)    [facility]    Exercise Vital Sign     Days of Exercise per Week: 0 days     Minutes of Exercise per Session: 0 min   Stress: No Stress Concern Present (2/2/2024)    Citizen of [place] of Occupational Health - Occupational Stress Questionnaire     Feeling of Stress : Only a little   Housing Stability: Low Risk  (2/2/2024)    Housing Stability Vital Sign     Unable to Pay for Housing in the Last Year: No     Number of Places Lived in the Last Year: 1     Unstable Housing in the Last Year: No     Medication List with Changes/Refills   Current Medications    AZELASTINE (ASTELIN) 137 MCG (0.1 %) NASAL SPRAY    2 sprays (274 mcg total) by Nasal route 2 (two) times daily.    CALCIUM-VITAMIN D (CALCIUM WITH VITAMIN D) 600 MG-10 MCG (400 UNIT) TAB    Take 1 tablet by mouth 2 (two) times a day. for 999 doses    CETIRIZINE (ZYRTEC) 10 MG TABLET     Take 1 tablet (10 mg total) by mouth once daily.    ESTRADIOL (ESTRACE) 0.5 MG TABLET    Take 1 tablet (0.5 mg total) by mouth once daily.    FLUTICASONE PROPIONATE (FLONASE) 50 MCG/ACTUATION NASAL SPRAY    2 sprays (100 mcg total) by Each Nostril route once daily.    LEVOTHYROXINE (SYNTHROID) 50 MCG TABLET    Take 1 tablet (50 mcg total) by mouth before breakfast.    LIOTHYRONINE (CYTOMEL) 5 MCG TAB    Take 1 tablet (5 mcg total) by mouth 2 (two) times a day.    MONTELUKAST (SINGULAIR) 10 MG TABLET    Take 1 tablet (10 mg total) by mouth every evening.    MULTIVITAMIN (THERAGRAN) PER TABLET    Take 1 tablet by mouth 2 (two) times daily.    POLYETHYLENE GLYCOL (GLYCOLAX) 17 GRAM/DOSE POWDER    Take 17 g by mouth once daily.    PRASTERONE, DHEA, (INTRAROSA) 6.5 MG INST    Place 1 suppository vaginally nightly.    PROPRANOLOL (INDERAL) 20 MG TABLET    Take 1 tablet (20 mg total) by mouth 2 (two) times daily as needed (for heart palpitaitons).    TOLTERODINE (DETROL LA) 4 MG 24 HR CAPSULE    TAKE 1 CAPSULE BY MOUTH ONCE  DAILY    TURMERIC (CURCUMIN MISC)    Take by mouth once daily.    VIBEGRON (GEMTESA) 75 MG TAB    Take 1 tablet (75 mg total) by mouth nightly.     Review of patient's allergies indicates:   Allergen Reactions    Levaquin [levofloxacin] Nausea And Vomiting       Physical Exam:   There is no height or weight on file to calculate BMI.    Physical Exam  Detailed MSK exam:   Ortho/SPM Exam   Right Hip:    Inspection:  Normal    Palpation tenderness: Greater trochanter and Lateral    Range of motion: 130 deg Flexion       40 deg IR - with Pain       70 deg ER    Strength:  5/5 Flexion    5-/5 Abduction    5/5 Adduction    Impingement Tests: Positive EDDIE     Positive FADIR    Other Tests:  Negative Log Roll  Positive Circumduction  Negative Piriformis     Negative Ar's          N/V Exam:  Tibial:    Normal sensory (plantar foot)  Normal motor (FHL)    Sup Peroneal:  Normal sensory (dorsal foot)  Normal  motor (Peroneals)            Deep Peroneal:  Normal sensory (1st web space) Normal motor (EHL)    Sural:   Normal sensory (lateral foot)   Saphenous:   Normal sensory (medial lower leg)   Normal pedal pulses, warm and well perfused with capillary refill < 2 sec         Imaging:  X-Ray Hip 2 or 3 views Right with Pelvis when performed  Narrative: EXAM:  XR HIP WITH PELVIS WHEN PERFORMED 2 OR 3 VIEWS RIGHT    CLINICAL HISTORY:  Right hip pain    FINDINGS:  3 views.    No fractures or dislocations.  Mild to moderate joint space narrowing involving the hip.  Impression:  Mild to moderate arthrosis of the right hip.    Finalized on: 6/17/2024 3:00 PM By:  Flaquito Gonzalez MD  [facility]# [id number]      2024-06-17 15:03:[id number]    [facility]      Relevant imaging results were reviewed and interpreted by me and per my read:  Mild degenerative changes about the right hip joint, with some sclerosis and acetabular over coverage.  No evidence of AVN.  Normal alignment.  No fractures or other acute abnormalities.    This was discussed with the patient and / or family today.     Patient Instructions   Assessment:  Mone Best is a 68 y.o. female with a chief complaint of Pain and Injury of the Right Hip    Encounter Diagnoses   Name Primary?    Fall, initial encounter     Right hip pain     Primary osteoarthritis of right hip Yes    Sacroiliac joint dysfunction of right side       Plan:  XR reviewed - mild degenerative changes about the right hip joint  Labs reviewed - A1c 5.1%, no renal dysfunction   History and clinical exam is consistent with chronic right hip pain due to underlying degenerative hip osteoarthritis, and SI joint dysfunction.  No indications of lumbar radiculopathy.  I do not believe this pain is significantly contributed to by underlying pelvic prolapse dysfunction.  Recommend conservative management.  [facility] - to include previous pelvic floor therapy, and incorporate hip and SI joint dysfunction rehab as  well.  May consider for corticosteroid injection if not improving.    Follow-up:  As needed or sooner if there are any problems between now and then.    Thank you for choosing [facility] and Dr. Corby Willson for your orthopedic & sports medicine care. It is our goal to provide you with exceptional care that will help keep you healthy, active, and get you back in the game.    Please do not hesitate to reach out to us via email, phone, or MyChart with any questions, concerns, or feedback.    If you are experiencing pain/discomfort ,or have questions after 5pm and would like to be connected to the [facility] on-call team, please call this number and specify which Sports Medicine provider is treating you: [phone]       A copy of today's visit note has been sent to the referring provider.           Corby Willson MD  Primary Care Sports Medicine    Disclaimer: This note was prepared using a voice recognition system and is likely to have sound alike errors within the text.

## 2024-07-23 ENCOUNTER — PATIENT MESSAGE (OUTPATIENT)
Dept: SURGERY | Facility: CLINIC | Age: 69
End: 2024-07-23
Payer: MEDICARE

## 2024-07-23 ENCOUNTER — PATIENT MESSAGE (OUTPATIENT)
Dept: INTERNAL MEDICINE | Facility: CLINIC | Age: 69
End: 2024-07-23
Payer: MEDICARE

## 2024-07-24 ENCOUNTER — PATIENT MESSAGE (OUTPATIENT)
Dept: SURGERY | Facility: CLINIC | Age: 69
End: 2024-07-24
Payer: MEDICARE

## 2024-07-24 ENCOUNTER — TELEPHONE (OUTPATIENT)
Dept: URGENT CARE | Facility: CLINIC | Age: 69
End: 2024-07-24

## 2024-07-24 ENCOUNTER — OFFICE VISIT (OUTPATIENT)
Dept: URGENT CARE | Facility: CLINIC | Age: 69
End: 2024-07-24
Payer: MEDICARE

## 2024-07-24 ENCOUNTER — HOSPITAL ENCOUNTER (OUTPATIENT)
Dept: RADIOLOGY | Facility: HOSPITAL | Age: 69
Discharge: HOME | End: 2024-07-24
Attending: PHYSICIAN ASSISTANT
Payer: MEDICARE

## 2024-07-24 ENCOUNTER — PATIENT MESSAGE (OUTPATIENT)
Dept: INTERNAL MEDICINE | Facility: CLINIC | Age: 69
End: 2024-07-24
Payer: MEDICARE

## 2024-07-24 VITALS
DIASTOLIC BLOOD PRESSURE: 81 MMHG | SYSTOLIC BLOOD PRESSURE: 135 MMHG | HEART RATE: 80 BPM | TEMPERATURE: 98 F | BODY MASS INDEX: 23.81 KG/M2 | WEIGHT: 139.44 LBS | RESPIRATION RATE: 12 BRPM | OXYGEN SATURATION: 100 % | HEIGHT: 64 IN

## 2024-07-24 DIAGNOSIS — K57.92 DIVERTICULITIS: Primary | ICD-10-CM

## 2024-07-24 DIAGNOSIS — R11.0 NAUSEA: ICD-10-CM

## 2024-07-24 DIAGNOSIS — K57.92 DIVERTICULITIS: ICD-10-CM

## 2024-07-24 DIAGNOSIS — Z87.19 HISTORY OF DIVERTICULOSIS: ICD-10-CM

## 2024-07-24 DIAGNOSIS — R10.32 LEFT LOWER QUADRANT ABDOMINAL PAIN: ICD-10-CM

## 2024-07-24 LAB
BILIRUBIN, UA POC OHS: NEGATIVE
BLOOD, UA POC OHS: NEGATIVE
CLARITY, UA POC OHS: CLEAR
COLOR, UA POC OHS: YELLOW
GLUCOSE, UA POC OHS: NEGATIVE
KETONES, UA POC OHS: NEGATIVE
LEUKOCYTES, UA POC OHS: NEGATIVE
NITRITE, UA POC OHS: NEGATIVE
PH, UA POC OHS: 6
PROTEIN, UA POC OHS: NEGATIVE
SPECIFIC GRAVITY, UA POC OHS: 1.01
UROBILINOGEN, UA POC OHS: 0.2

## 2024-07-24 PROCEDURE — 25500020 PHARM REV CODE 255: Performed by: PHYSICIAN ASSISTANT

## 2024-07-24 PROCEDURE — 74177 CT ABD & PELVIS W/CONTRAST: CPT | Mod: 26,,, | Performed by: RADIOLOGY

## 2024-07-24 PROCEDURE — 99214 OFFICE O/P EST MOD 30 MIN: CPT | Mod: S$GLB,,, | Performed by: PHYSICIAN ASSISTANT

## 2024-07-24 PROCEDURE — 74177 CT ABD & PELVIS W/CONTRAST: CPT | Mod: TC

## 2024-07-24 PROCEDURE — S0119 ONDANSETRON 4 MG: HCPCS | Mod: S$GLB,,, | Performed by: PHYSICIAN ASSISTANT

## 2024-07-24 PROCEDURE — 81003 URINALYSIS AUTO W/O SCOPE: CPT | Mod: QW,S$GLB,, | Performed by: PHYSICIAN ASSISTANT

## 2024-07-24 RX ORDER — METRONIDAZOLE 500 MG/1
500 TABLET ORAL EVERY 8 HOURS
Qty: 30 TABLET | Refills: 0 | Status: SHIPPED | OUTPATIENT
Start: 2024-07-24 | End: 2024-07-25 | Stop reason: ALTCHOICE

## 2024-07-24 RX ORDER — ONDANSETRON 4 MG/1
4 TABLET, ORALLY DISINTEGRATING ORAL EVERY 6 HOURS PRN
Qty: 20 TABLET | Refills: 0 | Status: SHIPPED | OUTPATIENT
Start: 2024-07-24

## 2024-07-24 RX ORDER — TRAMADOL HYDROCHLORIDE 50 MG/1
50 TABLET ORAL EVERY 6 HOURS PRN
Qty: 12 TABLET | Refills: 0 | Status: SHIPPED | OUTPATIENT
Start: 2024-07-24 | End: 2024-07-27

## 2024-07-24 RX ORDER — CIPROFLOXACIN 500 MG/1
500 TABLET ORAL 2 TIMES DAILY
Qty: 20 TABLET | Refills: 0 | Status: SHIPPED | OUTPATIENT
Start: 2024-07-24 | End: 2024-08-03

## 2024-07-24 RX ORDER — ONDANSETRON 4 MG/1
4 TABLET, ORALLY DISINTEGRATING ORAL
Status: COMPLETED | OUTPATIENT
Start: 2024-07-24 | End: 2024-07-24

## 2024-07-24 RX ORDER — POLYETHYLENE GLYCOL 3350 17 G/17G
POWDER, FOR SOLUTION ORAL
COMMUNITY
End: 2024-07-24

## 2024-07-24 RX ADMIN — ONDANSETRON 4 MG: 4 TABLET, ORALLY DISINTEGRATING ORAL at 08:07

## 2024-07-24 RX ADMIN — IOHEXOL 100 ML: 350 INJECTION, SOLUTION INTRAVENOUS at 11:07

## 2024-07-24 NOTE — PROGRESS NOTES
"Subjective:      Patient ID: Mone Best is a 68 y.o. female.    Vitals:  height is 5' 4" (1.626 m) and weight is 63.3 kg (139 lb 7.1 oz). Her oral temperature is 98.2 °F (36.8 °C). Her blood pressure is 135/81 and her pulse is 80. Her respiration is 12 and oxygen saturation is 100%.     Chief Complaint: Abdominal Pain (Left lower quadrant )    Patient presents at the clinic today with left lower abdominal pain that began Sunday evening 7/21. Gradually getting worse, pain level 8. She reports hx of diverticulosis and colon polyps, was treated for diverticulitis a few years ago. Feels symptoms are similar to previous flare. Patient reports sleep disturbances due to the intensity of pain. The pain is in waves, described as sharp cramps. Patient reports pain radiates into the lower back. Reports nausea and decreased appetite, denies vomiting. Patient reports drinking water and tea but cannot tolerate meals. Patient states that she has not eaten since Sunday. Patient reports feeling bloated, belching and gas. Patient reports chills and feeling clammy but no fever. She reports stomach pain seems to get worse upon drinking or attempting to eat. She reports having no bowel movements Sunday but reports regular bowl movements yesterday and today. Takes Miralax every other day for chronic constipation.Patient has tried tylenol and utilizing a heating pad but reports no relief. Denies recent travel, antibiotic use, or suspicious food intake. Denies fever, bloody stools, dysuria, hematuria, decreased urine output. Tried getting appt with PCP but couldn't be seen until tomorrow.     Abdominal Pain  This is a new problem. Episode onset: Onset 4 days ago. The problem occurs constantly. The most recent episode lasted 24 hours. The problem has been waxing and waning. The pain is located in the LLQ. The pain is at a severity of 8/10. The pain is severe. The quality of the pain is cramping and sharp. The abdominal pain radiates " to the left flank and back. Associated symptoms include anorexia, belching, flatus, headaches, myalgias and nausea. Pertinent negatives include no constipation, diarrhea, dysuria, fever, frequency, hematochezia, hematuria, melena, vomiting or weight loss. The pain is aggravated by eating, belching, certain positions, being still and movement. The pain is relieved by Activity. She has tried acetaminophen for the symptoms. The treatment provided mild relief. Her past medical history is significant for abdominal surgery (hysterectomy, appendectomy). There is no history of colon cancer, Crohn's disease, gallstones, GERD, irritable bowel syndrome, pancreatitis or ulcerative colitis. Patient's medical history does not include kidney stones and UTI.       Constitution: Positive for appetite change and chills. Negative for fever.   HENT: Negative.     Gastrointestinal:  Positive for abdominal pain, abdominal bloating, history of abdominal surgery (hysterectomy, appendectomy) and nausea. Negative for vomiting, constipation, diarrhea, bright red blood in stool and dark colored stools.   Genitourinary:  Negative for dysuria, frequency, hematuria, history of kidney stones and vaginal discharge.   Musculoskeletal:  Positive for muscle ache.   Skin: Negative.    Neurological:  Positive for headaches. Negative for dizziness and passing out.      Objective:     Physical Exam   Constitutional: She appears well-developed.  Non-toxic appearance. She does not appear ill. No distress.   HENT:   Head: Normocephalic and atraumatic.   Ears:   Right Ear: External ear normal.   Left Ear: External ear normal.   Nose: Nose normal.   Eyes: Conjunctivae and EOM are normal.   Neck: Neck supple.   Pulmonary/Chest: Effort normal and breath sounds normal.   Abdominal: Normal appearance and bowel sounds are normal. She exhibits no distension. Soft. flat abdomen There is abdominal tenderness in the periumbilical area, left upper quadrant and left  lower quadrant. There is no guarding.   Musculoskeletal: Normal range of motion.         General: Normal range of motion.   Neurological: no focal deficit. She is alert. She displays no weakness. Gait normal.   Skin: Skin is warm, dry, not diaphoretic, not pale and no rash.         Comments: No jaundice   Psychiatric: Her behavior is normal.     Results for orders placed or performed in visit on 07/24/24   POCT Urinalysis(Instrument)   Result Value Ref Range    Color, POC UA Yellow Yellow, Straw, Colorless    Clarity, POC UA Clear Clear    Glucose, POC UA Negative Negative    Bilirubin, POC UA Negative Negative    Ketones, POC UA Negative Negative    Spec Grav POC UA 1.015 1.005 - 1.030    Blood, POC UA Negative Negative    pH, POC UA 6.0 5.0 - 8.0    Protein, POC UA Negative Negative    Urobilinogen, POC UA 0.2 <=1.0    Nitrite, POC UA Negative Negative    WBC, POC UA Negative Negative         Assessment:     1. Diverticulitis    2. Left lower quadrant abdominal pain    3. Nausea    4. History of diverticulosis        Plan:       Diverticulitis  -     CT ABDOMEN PELVIS WITH CONTRAST (PANCREAS PROTOCOL); Future; Expected date: 07/24/2024  -     CREATININE, SERUM; Future; Expected date: 07/24/2024    Left lower quadrant abdominal pain  -     POCT Urinalysis(Instrument)  -     CT ABDOMEN PELVIS WITH CONTRAST (PANCREAS PROTOCOL); Future; Expected date: 07/24/2024  -     traMADoL (ULTRAM) 50 mg tablet; Take 1 tablet (50 mg total) by mouth every 6 (six) hours as needed for Pain.  Dispense: 12 tablet; Refill: 0    Nausea  -     ondansetron disintegrating tablet 4 mg  -     CT ABDOMEN PELVIS WITH CONTRAST (PANCREAS PROTOCOL); Future; Expected date: 07/24/2024  -     ondansetron (ZOFRAN-ODT) 4 MG TbDL; Take 1 tablet (4 mg total) by mouth every 6 (six) hours as needed (nausea/vomiting).  Dispense: 20 tablet; Refill: 0    History of diverticulosis  -     CT ABDOMEN PELVIS WITH CONTRAST (PANCREAS PROTOCOL); Future; Expected  date: 07/24/2024          Medical Decision Making:   Initial Assessment:   NAD. VSS. LLQ pain x 3-4 days, hx of diverticulitis.   Differential Diagnosis:   Diverticulitis, colitis, SBO, intraabdominal abscess, bowel perforation, viral gastroenteritis, renal stone, UTI, pyelonephritis  Clinical Tests:   Lab Tests: Ordered and Reviewed  The following lab test(s) were unremarkable: Urinalysis  Radiological Study: Ordered  Urgent Care Management:  High suspicion for diverticulitis given pt's hx and clinical presentation, however informed pt that we cannot 100% confirm without CT. Discussed starting on abx for presumed diverticulitis and f/u with PCP tomorrow for further workup. Discussed that imaging capabilities and lab work is limited in UC setting and cannot guarantee STAT results or that outpatient imaging could be done today. Pt voiced frustration with this. I was able to get CT scheduled for today at [facility] and pt was instructed to go there immediately to have done. Will have STAT crt done prior. Informed pt that depending on CT results, she will likely be advised to f/u with her PCP or go to ED. Will wait on sending abx until CT resulted. Pt given zofran in clinic and will send refill for her to use at home as needed. Pt voiced request for pain medication, as Tylenol is not helping with pain. She has Bentyl at home but has not tried taking it yet. Discussed with her that narcotic pain medication can cause constipation. Will send in 3 day course of Tramadol. BRAT diet as tolerated. Drink plenty of fluids to stay hydrated. ED precautions for any new or worsening symptoms. Pt will be contacted with CT results and any further instructions.

## 2024-07-24 NOTE — PATIENT INSTRUCTIONS
Please follow up with your Primary Care provider if your signs and symptoms have not resolved. If your condition worsens, or you develop new symptoms, we recommend that you receive another evaluation at the Emergency Room immediately or contact your primary medical clinic to discuss your concerns.    You must understand that you have received an Urgent Care treatment only and that you may be released before all of your medical problems are known or treated. You, the patient, will arrange for follow up care as instructed.     RED FLAGS/WARNING SYMPTOMS DISCUSSED WITH PATIENT THAT WOULD WARRANT EMERGENT MEDICAL ATTENTION. PATIENT VERBALIZED UNDERSTANDING.

## 2024-07-24 NOTE — TELEPHONE ENCOUNTER
Pt called stating that she was reading her AVS and did not see her antibiotic or pain medication sent in to her pharmacy - [facility] on [address]. Please advise

## 2024-07-25 ENCOUNTER — OFFICE VISIT (OUTPATIENT)
Dept: INTERNAL MEDICINE | Facility: CLINIC | Age: 69
End: 2024-07-25
Payer: MEDICARE

## 2024-07-25 ENCOUNTER — PATIENT MESSAGE (OUTPATIENT)
Dept: INTERNAL MEDICINE | Facility: CLINIC | Age: 69
End: 2024-07-25

## 2024-07-25 DIAGNOSIS — K57.32 DIVERTICULITIS OF LARGE INTESTINE WITHOUT PERFORATION OR ABSCESS WITHOUT BLEEDING: Primary | ICD-10-CM

## 2024-07-25 PROCEDURE — 3044F HG A1C LEVEL LT 7.0%: CPT | Mod: CPTII,95,, | Performed by: PHYSICIAN ASSISTANT

## 2024-07-25 PROCEDURE — 1159F MED LIST DOCD IN RCRD: CPT | Mod: CPTII,95,, | Performed by: PHYSICIAN ASSISTANT

## 2024-07-25 PROCEDURE — 1160F RVW MEDS BY RX/DR IN RCRD: CPT | Mod: CPTII,95,, | Performed by: PHYSICIAN ASSISTANT

## 2024-07-25 PROCEDURE — 99214 OFFICE O/P EST MOD 30 MIN: CPT | Mod: 95,,, | Performed by: PHYSICIAN ASSISTANT

## 2024-07-25 RX ORDER — AMOXICILLIN AND CLAVULANATE POTASSIUM 875; 125 MG/1; MG/1
1 TABLET, FILM COATED ORAL 3 TIMES DAILY
Qty: 30 TABLET | Refills: 0 | Status: SHIPPED | OUTPATIENT
Start: 2024-07-25 | End: 2024-08-04

## 2024-07-25 RX ORDER — AMOXICILLIN AND CLAVULANATE POTASSIUM 875; 125 MG/1; MG/1
1 TABLET, FILM COATED ORAL 2 TIMES DAILY
Qty: 20 TABLET | Refills: 0 | Status: SHIPPED | OUTPATIENT
Start: 2024-07-25 | End: 2024-07-25 | Stop reason: ALTCHOICE

## 2024-07-25 NOTE — PROGRESS NOTES
The patient location is: [place], [place]  The chief complaint leading to consultation is: acute diverticulitis    Visit type: audiovisual    Face to Face time with patient: 13 min  15 minutes of total time spent on the encounter, which includes face to face time and non-face to face time preparing to see the patient (eg, review of tests), Obtaining and/or reviewing separately obtained history, Documenting clinical information in the electronic or other health record, Independently interpreting results (not separately reported) and communicating results to the patient/family/caregiver, or Care coordination (not separately reported).         Each patient to whom he or she provides medical services by telemedicine is:  (1) informed of the relationship between the physician and patient and the respective role of any other health care provider with respect to management of the patient; and (2) notified that he or she may decline to receive medical services by telemedicine and may withdraw from such care at any time.    Notes:    Subjective:      Patient ID: Mone Best is a 68 y.o. female.    Chief Complaint: No chief complaint on file.    Abdominal Pain  This is a new problem. The current episode started in the past 7 days. The onset quality is sudden. The problem occurs constantly. The most recent episode lasted 5 days. The pain is located in the LLQ. The pain is at a severity of 8/10. The pain is moderate. The quality of the pain is cramping, a sensation of fullness and sharp. Associated symptoms include anorexia, belching and nausea. Pertinent negatives include no arthralgias, constipation, diarrhea, dysuria, fever, flatus, frequency, headaches, hematochezia, hematuria, melena, myalgias, vomiting or weight loss. The pain is aggravated by certain positions and eating. The pain is relieved by Bowel movements, passing flatus and recumbency. She has tried acetaminophen and antibiotics for the symptoms. The  treatment provided moderate relief. Prior diagnostic workup includes lower endoscopy.     Started on Sunday. Went to urgent care on Wednesday had CT and abx. CT showing chronic diverticulitis. Started on cipro/metro which is aggravating her stomach (nausea). Worried about taking two antibiotics. In past tolerated augmentin. Today pain improved. Cramping pain in LLQ. Initially had constipation on Sunday and then normal bowel movements after that. Would like recommendation on what foods to eat and what foods to avoid.     Patient Active Problem List   Diagnosis    PSVT (paroxysmal supraventricular tachycardia)    Diverticulosis    Constipation, chronic    Stress incontinence    Osteopenia    Moderate mixed hyperlipidemia not requiring statin therapy    Palpitations    Acquired hypothyroidism    History of colonic polyps    Vitamin D deficiency    Surgical menopause on hormone replacement therapy    SOLOMON (dyspnea on exertion)    Abnormal ECG    Nonrheumatic mitral valve regurgitation    Other fatigue    Seasonal allergic rhinitis    Juvenile idiopathic scoliosis of thoracic region    Chronic bilateral low back pain without sciatica    Anterolisthesis of lumbar spine    Pars defect of lumbar spine    Vaginal prolapse    Menopausal vasomotor syndrome    Vaginal vault prolapse    Atrophic vaginitis    Posterior vaginal wall prolapse         Current Outpatient Medications:     amoxicillin-clavulanate 875-125mg (AUGMENTIN) 875-125 mg per tablet, Take 1 tablet by mouth 3 (three) times daily. for 10 days, Disp: 30 tablet, Rfl: 0    azelastine (ASTELIN) 137 mcg (0.1 %) nasal spray, 2 sprays (274 mcg total) by Nasal route 2 (two) times daily., Disp: 60 mL, Rfl: 11    calcium-vitamin D (CALCIUM WITH VITAMIN D) 600 mg-10 mcg (400 unit) Tab, Take 1 tablet by mouth 2 (two) times a day. for 999 doses (Patient not taking: Reported on 7/24/2024), Disp: 180 tablet, Rfl: 4    cetirizine (ZYRTEC) 10 MG tablet, Take 1 tablet (10 mg total)  by mouth once daily., Disp: , Rfl:     ciprofloxacin HCl (CIPRO) 500 MG tablet, Take 1 tablet (500 mg total) by mouth 2 (two) times daily. for 10 days, Disp: 20 tablet, Rfl: 0    estradioL (ESTRACE) 0.5 MG tablet, Take 1 tablet (0.5 mg total) by mouth once daily., Disp: 90 tablet, Rfl: 1    fluticasone propionate (FLONASE) 50 mcg/actuation nasal spray, 2 sprays (100 mcg total) by Each Nostril route once daily., Disp: , Rfl:     levothyroxine (SYNTHROID) 50 MCG tablet, Take 1 tablet (50 mcg total) by mouth before breakfast., Disp: 90 tablet, Rfl: 2    liothyronine (CYTOMEL) 5 MCG Tab, Take 1 tablet (5 mcg total) by mouth 2 (two) times a day., Disp: 180 tablet, Rfl: 2    montelukast (SINGULAIR) 10 mg tablet, Take 1 tablet (10 mg total) by mouth every evening., Disp: 90 tablet, Rfl: 3    multivitamin (THERAGRAN) per tablet, Take 1 tablet by mouth 2 (two) times daily., Disp: , Rfl:     ondansetron (ZOFRAN-ODT) 4 MG TbDL, Take 1 tablet (4 mg total) by mouth every 6 (six) hours as needed (nausea/vomiting)., Disp: 20 tablet, Rfl: 0    prasterone, dhea, (INTRAROSA) 6.5 mg Inst, Place 1 suppository vaginally nightly., Disp: 28 each, Rfl: 11    propranoloL (INDERAL) 20 MG tablet, Take 1 tablet (20 mg total) by mouth 2 (two) times daily as needed (for heart palpitaitons)., Disp: 90 tablet, Rfl: 2    traMADoL (ULTRAM) 50 mg tablet, Take 1 tablet (50 mg total) by mouth every 6 (six) hours as needed for Pain., Disp: 12 tablet, Rfl: 0    TURMERIC (CURCUMIN MISC), Take by mouth once daily., Disp: , Rfl:     vibegron (GEMTESA) 75 mg Tab, Take 1 tablet (75 mg total) by mouth nightly. (Patient not taking: Reported on 7/24/2024), Disp: 90 tablet, Rfl: 3    Review of Systems   Constitutional:  Positive for fatigue. Negative for activity change, appetite change, chills, diaphoresis, fever, unexpected weight change and weight loss.   HENT: Negative.  Negative for congestion, hearing loss, postnasal drip, rhinorrhea, sore throat, trouble  swallowing and voice change.    Eyes: Negative.  Negative for visual disturbance.   Respiratory: Negative.  Negative for cough, choking, chest tightness and shortness of breath.    Cardiovascular:  Negative for chest pain, palpitations and leg swelling.   Gastrointestinal:  Positive for abdominal distention, abdominal pain, anorexia and nausea. Negative for anal bleeding, blood in stool, constipation, diarrhea, flatus, hematochezia, melena, rectal pain and vomiting.   Endocrine: Negative for cold intolerance, heat intolerance, polydipsia and polyuria.   Genitourinary: Negative.  Negative for difficulty urinating, dysuria, frequency and hematuria.   Musculoskeletal:  Negative for arthralgias, back pain, gait problem, joint swelling and myalgias.   Skin:  Negative for color change, pallor, rash and wound.   Neurological:  Negative for dizziness, tremors, weakness, light-headedness, numbness and headaches.   Hematological:  Negative for adenopathy.   Psychiatric/Behavioral:  Negative for behavioral problems, confusion, self-injury, sleep disturbance and suicidal ideas. The patient is not nervous/anxious.      Objective:   There were no vitals taken for this visit.    Physical Exam  Constitutional:       General: She is not in acute distress.     Appearance: Normal appearance. She is not ill-appearing, toxic-appearing or diaphoretic.   HENT:      Head: Normocephalic and atraumatic.   Pulmonary:      Effort: Pulmonary effort is normal. No respiratory distress.      Breath sounds: Normal breath sounds.   Skin:     Coloration: Skin is not pale.      Findings: No bruising, erythema or rash.   Neurological:      Mental Status: She is alert and oriented to person, place, and time.   Psychiatric:         Mood and Affect: Mood normal.         Behavior: Behavior normal.         Thought Content: Thought content normal.         Judgment: Judgment normal.       CT ABDOMEN PELVIS WITH CONTRAST (PANCREAS PROTOCOL)  Narrative: EXAM:   CT ABDOMEN PELVIS WITH CONTRAST (PANCREAS PROTOCOL)    CLINICAL HISTORY: Diverticulosis rule out diverticulitis    TECHNIQUE: Routine contrast-enhanced CT protocol of the abdomen and pelvis was performed before and after the intravenous administration of  100 mL of Omni 300 contrast during venous and delayed phase.  No oral contrast.    COMPARISON: No prior abdominal CT available for comparison.    FINDINGS:    No concerning abnormality involves the lung bases.    The pancreas, and adrenal glands are not unusual in contrast-enhanced CT appearance.   1.8 cm cyst within segment 7 right hepatic lobe.  Liver otherwise unremarkable.  Moderate gallbladder distention.  The bile ducts are within normal limits.  Borderline splenomegaly.     The kidneys enhance homogeneously. Mild prominence of the collecting systems bilaterally without dileep hydronephrosis.  Bilateral extrarenal pelvis.  Kidneys and ureters are unremarkable. No evidence of hydronephrosis.  Mild bladder distention.    Small hiatal hernia.  The stomach, small bowel, and colon are unremarkable.  Diverticulosis throughout the large bowel greatest within the descending and sigmoid colon without definite evidence for acute diverticulitis.  Muscular hypertrophy within the sigmoid colon likely reflects chronic diverticulitis.  No evidence of bowel obstruction or acute inflammatory process. No free fluid, free air, or abscess.  There is no evidence of acute appendicitis.    No aortic aneurysm identified.  No pathologically enlarged lymph nodes.    The reproductive organs are not unusual in CT appearance.    No acute or suspicious osseous lesion is evident.  Impression: Diverticulosis throughout the large bowel greatest within the descending and sigmoid colon without definite evidence for acute diverticulitis.  Muscular hypertrophy within the sigmoid colon likely reflects chronic diverticulitis.    Moderate gallbladder distention.    Mild bladder distention.    All CT  scans at [this location] are performed using dose modulation techniques as appropriate to a performed exam including the following: automated exposure control; adjustment of the mA and/or kV according to patient size (this includes techniques or standardized protocols for targeted exams where dose is matched to indication / reason for exam; i.e. extremities or head); use of iterative reconstruction technique.    Finalized on: 7/24/2024 11:26 AM By:  Jose Guan MD  [facility]# [id number]      2024-07-24 11:29:[id number]    [facility]     Assessment:     1. Diverticulitis of large intestine without perforation or abscess without bleeding      Plan:   Diverticulitis of large intestine without perforation or abscess without bleeding  -     Ambulatory referral/consult to Colorectal Surgery; Future; Expected date: 08/01/2024  -     amoxicillin-clavulanate 875-125mg (AUGMENTIN) 875-125 mg per tablet; Take 1 tablet by mouth 3 (three) times daily. for 10 days  Dispense: 30 tablet; Refill: 0    -low fiber diet until resolved  -increase water intake  -soft food diet and then gradually increase to normal diet and then eventually high fiber diet to prevent reoccurrence.     Follow up if symptoms worsen or fail to improve.

## 2024-07-29 ENCOUNTER — PATIENT MESSAGE (OUTPATIENT)
Dept: INTERNAL MEDICINE | Facility: CLINIC | Age: 69
End: 2024-07-29
Payer: MEDICARE

## 2024-08-05 ENCOUNTER — OFFICE VISIT (OUTPATIENT)
Dept: SURGERY | Facility: CLINIC | Age: 69
End: 2024-08-05
Payer: MEDICARE

## 2024-08-05 VITALS
HEART RATE: 75 BPM | TEMPERATURE: 98 F | BODY MASS INDEX: 23.66 KG/M2 | DIASTOLIC BLOOD PRESSURE: 82 MMHG | HEIGHT: 64 IN | SYSTOLIC BLOOD PRESSURE: 128 MMHG | WEIGHT: 138.56 LBS | OXYGEN SATURATION: 99 %

## 2024-08-05 DIAGNOSIS — Z87.19 HISTORY OF DIVERTICULITIS: ICD-10-CM

## 2024-08-05 DIAGNOSIS — K57.32 DIVERTICULITIS OF LARGE INTESTINE WITHOUT PERFORATION OR ABSCESS WITHOUT BLEEDING: Primary | ICD-10-CM

## 2024-08-05 DIAGNOSIS — Z86.010 HISTORY OF COLON POLYPS: ICD-10-CM

## 2024-08-05 DIAGNOSIS — Z01.818 PREOP TESTING: Primary | ICD-10-CM

## 2024-08-05 PROCEDURE — 1159F MED LIST DOCD IN RCRD: CPT | Mod: CPTII,S$GLB,, | Performed by: COLON & RECTAL SURGERY

## 2024-08-05 PROCEDURE — 3008F BODY MASS INDEX DOCD: CPT | Mod: CPTII,S$GLB,, | Performed by: COLON & RECTAL SURGERY

## 2024-08-05 PROCEDURE — 3079F DIAST BP 80-89 MM HG: CPT | Mod: CPTII,S$GLB,, | Performed by: COLON & RECTAL SURGERY

## 2024-08-05 PROCEDURE — 3044F HG A1C LEVEL LT 7.0%: CPT | Mod: CPTII,S$GLB,, | Performed by: COLON & RECTAL SURGERY

## 2024-08-05 PROCEDURE — 3074F SYST BP LT 130 MM HG: CPT | Mod: CPTII,S$GLB,, | Performed by: COLON & RECTAL SURGERY

## 2024-08-05 PROCEDURE — 99215 OFFICE O/P EST HI 40 MIN: CPT | Mod: S$GLB,,, | Performed by: COLON & RECTAL SURGERY

## 2024-08-05 PROCEDURE — 1126F AMNT PAIN NOTED NONE PRSNT: CPT | Mod: CPTII,S$GLB,, | Performed by: COLON & RECTAL SURGERY

## 2024-08-05 PROCEDURE — 99999 PR PBB SHADOW E&M-EST. PATIENT-LVL IV: CPT | Mod: PBBFAC,,, | Performed by: COLON & RECTAL SURGERY

## 2024-08-06 ENCOUNTER — PATIENT MESSAGE (OUTPATIENT)
Dept: INTERNAL MEDICINE | Facility: CLINIC | Age: 69
End: 2024-08-06
Payer: MEDICARE

## 2024-08-06 ENCOUNTER — TELEPHONE (OUTPATIENT)
Dept: SURGERY | Facility: CLINIC | Age: 69
End: 2024-08-06
Payer: MEDICARE

## 2024-08-06 ENCOUNTER — PATIENT MESSAGE (OUTPATIENT)
Dept: SURGERY | Facility: CLINIC | Age: 69
End: 2024-08-06
Payer: MEDICARE

## 2024-08-26 ENCOUNTER — OFFICE VISIT (OUTPATIENT)
Dept: INTERNAL MEDICINE | Facility: CLINIC | Age: 69
End: 2024-08-26
Payer: MEDICARE

## 2024-08-26 ENCOUNTER — PATIENT MESSAGE (OUTPATIENT)
Dept: INTERNAL MEDICINE | Facility: CLINIC | Age: 69
End: 2024-08-26

## 2024-08-26 ENCOUNTER — HOSPITAL ENCOUNTER (OUTPATIENT)
Dept: RADIOLOGY | Facility: HOSPITAL | Age: 69
Discharge: HOME | End: 2024-08-26
Attending: NURSE PRACTITIONER
Payer: MEDICARE

## 2024-08-26 VITALS
DIASTOLIC BLOOD PRESSURE: 80 MMHG | TEMPERATURE: 97 F | OXYGEN SATURATION: 93 % | WEIGHT: 140.44 LBS | HEIGHT: 64 IN | SYSTOLIC BLOOD PRESSURE: 116 MMHG | HEART RATE: 76 BPM | BODY MASS INDEX: 23.98 KG/M2

## 2024-08-26 DIAGNOSIS — Z00.00 ENCOUNTER FOR PREVENTIVE HEALTH EXAMINATION: Primary | ICD-10-CM

## 2024-08-26 DIAGNOSIS — J30.2 SEASONAL ALLERGIC RHINITIS, UNSPECIFIED TRIGGER: Chronic | ICD-10-CM

## 2024-08-26 DIAGNOSIS — R93.3 MULTIPLE AIR FLUID LEVELS OF SMALL INTESTINE DETERMINED BY X-RAY: ICD-10-CM

## 2024-08-26 DIAGNOSIS — Z13.220 SCREENING FOR HYPERLIPIDEMIA: ICD-10-CM

## 2024-08-26 DIAGNOSIS — R10.31 RIGHT LOWER QUADRANT ABDOMINAL PAIN: ICD-10-CM

## 2024-08-26 DIAGNOSIS — N95.1 MENOPAUSAL VASOMOTOR SYNDROME: Chronic | ICD-10-CM

## 2024-08-26 DIAGNOSIS — R00.2 PALPITATIONS: Chronic | ICD-10-CM

## 2024-08-26 DIAGNOSIS — E03.9 ACQUIRED HYPOTHYROIDISM: Chronic | ICD-10-CM

## 2024-08-26 DIAGNOSIS — E78.5 HYPERLIPIDEMIA, UNSPECIFIED HYPERLIPIDEMIA TYPE: ICD-10-CM

## 2024-08-26 LAB
BILIRUB UR QL STRIP: NEGATIVE
CHOLEST SERPL-MCNC: 189 MG/DL (ref 120–199)
CHOLEST/HDLC SERPL: 4.5 {RATIO} (ref 2–5)
CLARITY UR: CLEAR
COLOR UR: COLORLESS
GLUCOSE UR QL STRIP: NEGATIVE
HDLC SERPL-MCNC: 42 MG/DL (ref 40–75)
HDLC SERPL: 22.2 % (ref 20–50)
HGB UR QL STRIP: NEGATIVE
KETONES UR QL STRIP: NEGATIVE
LDLC SERPL CALC-MCNC: 91 MG/DL (ref 63–159)
LEUKOCYTE ESTERASE UR QL STRIP: NEGATIVE
NITRITE UR QL STRIP: NEGATIVE
NONHDLC SERPL-MCNC: 147 MG/DL
PH UR STRIP: 6 [PH] (ref 5–8)
PROT UR QL STRIP: NEGATIVE
SP GR UR STRIP: <=1.005 (ref 1–1.03)
T3 SERPL-MCNC: 105 NG/DL (ref 60–180)
T4 FREE SERPL-MCNC: 0.78 NG/DL (ref 0.71–1.51)
TRIGL SERPL-MCNC: 280 MG/DL (ref 30–150)
TSH SERPL DL<=0.005 MIU/L-ACNC: 1.15 UIU/ML (ref 0.4–4)
URN SPEC COLLECT METH UR: ABNORMAL

## 2024-08-26 PROCEDURE — 1159F MED LIST DOCD IN RCRD: CPT | Mod: CPTII,S$GLB,, | Performed by: NURSE PRACTITIONER

## 2024-08-26 PROCEDURE — 84443 ASSAY THYROID STIM HORMONE: CPT | Performed by: NURSE PRACTITIONER

## 2024-08-26 PROCEDURE — 80061 LIPID PANEL: CPT | Performed by: NURSE PRACTITIONER

## 2024-08-26 PROCEDURE — 84480 ASSAY TRIIODOTHYRONINE (T3): CPT | Performed by: NURSE PRACTITIONER

## 2024-08-26 PROCEDURE — 3288F FALL RISK ASSESSMENT DOCD: CPT | Mod: CPTII,S$GLB,, | Performed by: NURSE PRACTITIONER

## 2024-08-26 PROCEDURE — 1126F AMNT PAIN NOTED NONE PRSNT: CPT | Mod: CPTII,S$GLB,, | Performed by: NURSE PRACTITIONER

## 2024-08-26 PROCEDURE — G2211 COMPLEX E/M VISIT ADD ON: HCPCS | Mod: S$GLB,,, | Performed by: NURSE PRACTITIONER

## 2024-08-26 PROCEDURE — 3044F HG A1C LEVEL LT 7.0%: CPT | Mod: CPTII,S$GLB,, | Performed by: NURSE PRACTITIONER

## 2024-08-26 PROCEDURE — 99214 OFFICE O/P EST MOD 30 MIN: CPT | Mod: S$GLB,,, | Performed by: NURSE PRACTITIONER

## 2024-08-26 PROCEDURE — 3074F SYST BP LT 130 MM HG: CPT | Mod: CPTII,S$GLB,, | Performed by: NURSE PRACTITIONER

## 2024-08-26 PROCEDURE — 84439 ASSAY OF FREE THYROXINE: CPT | Performed by: NURSE PRACTITIONER

## 2024-08-26 PROCEDURE — 3079F DIAST BP 80-89 MM HG: CPT | Mod: CPTII,S$GLB,, | Performed by: NURSE PRACTITIONER

## 2024-08-26 PROCEDURE — 1101F PT FALLS ASSESS-DOCD LE1/YR: CPT | Mod: CPTII,S$GLB,, | Performed by: NURSE PRACTITIONER

## 2024-08-26 PROCEDURE — 74019 RADEX ABDOMEN 2 VIEWS: CPT | Mod: TC

## 2024-08-26 PROCEDURE — 3008F BODY MASS INDEX DOCD: CPT | Mod: CPTII,S$GLB,, | Performed by: NURSE PRACTITIONER

## 2024-08-26 PROCEDURE — 74019 RADEX ABDOMEN 2 VIEWS: CPT | Mod: 26,,, | Performed by: RADIOLOGY

## 2024-08-26 PROCEDURE — 81003 URINALYSIS AUTO W/O SCOPE: CPT | Performed by: NURSE PRACTITIONER

## 2024-08-26 PROCEDURE — 99999 PR PBB SHADOW E&M-EST. PATIENT-LVL IV: CPT | Mod: PBBFAC,,, | Performed by: NURSE PRACTITIONER

## 2024-08-26 PROCEDURE — 1160F RVW MEDS BY RX/DR IN RCRD: CPT | Mod: CPTII,S$GLB,, | Performed by: NURSE PRACTITIONER

## 2024-08-26 RX ORDER — LEVOCARNITINE TARTRATE 500 MG
500 CAPSULE ORAL
COMMUNITY

## 2024-08-26 RX ORDER — PROPRANOLOL HYDROCHLORIDE 20 MG/1
20 TABLET ORAL 2 TIMES DAILY PRN
Qty: 90 TABLET | Refills: 2 | Status: SHIPPED | OUTPATIENT
Start: 2024-08-26 | End: 2024-08-27

## 2024-08-26 RX ORDER — LEVOTHYROXINE SODIUM 50 UG/1
50 TABLET ORAL
Qty: 90 TABLET | Refills: 2 | Status: SHIPPED | OUTPATIENT
Start: 2024-08-26 | End: 2024-08-27

## 2024-08-26 RX ORDER — BROMPHENIRAMINE MALEATE, DEXTROMETHORPHAN HBR, PHENYLEPHRINE HCL, DIPHENHYDRAMINE HCL, PHENYLEPHRINE HCL 0.52G
0.52 KIT ORAL DAILY
COMMUNITY

## 2024-08-26 RX ORDER — ESTRADIOL 0.5 MG/1
0.5 TABLET ORAL DAILY
Qty: 90 TABLET | Refills: 1 | Status: SHIPPED | OUTPATIENT
Start: 2024-08-26 | End: 2024-08-27

## 2024-08-26 RX ORDER — MONTELUKAST SODIUM 10 MG/1
10 TABLET ORAL NIGHTLY
Qty: 90 TABLET | Refills: 3 | Status: SHIPPED | OUTPATIENT
Start: 2024-08-26 | End: 2024-08-27

## 2024-08-26 RX ORDER — FLUTICASONE PROPIONATE 50 MCG
2 SPRAY, SUSPENSION (ML) NASAL DAILY
COMMUNITY
Start: 2024-08-26 | End: 2024-08-27

## 2024-08-26 RX ORDER — POLYETHYLENE GLYCOL 3350 17 G/17G
POWDER, FOR SOLUTION ORAL
COMMUNITY

## 2024-08-26 NOTE — PROGRESS NOTES
Subjective:      Patient ID: Mone Best is a 68 y.o. female.    Chief Complaint: Follow-up (Pt present today for a 6 month f/u )    History of Present Illness    CHIEF COMPLAINT:  Mone presents today for annual follow-up.    DIVERTICULOSIS AND SURGICAL CONSIDERATIONS:  She reports a recent episode of diverticulosis. CT revealed an inflamed and thickened sigmoid colon, though not infected. Dr. Adames has suggested sigmoid colon resection. She expresses anxiety about the potential surgery, particularly the possibility of a colostomy. She is seeking second and third opinions from other specialists, including appointments with Dr. Rashid on the 4th and considering Dr. Quintero, to explore alternative management options. These include dietary modifications and medications like MiraLax and Metamucil. She acknowledges concerns about developing complications if surgery is delayed but remains apprehensive about proceeding with the operation. She is weighing the dilemma between avoiding surgery and the risk of developing complications that would necessitate urgent surgical intervention.    ROS:  General: -fever, -chills, -fatigue, -weight gain, -weight loss  Eyes: -vision changes, -redness, -discharge  ENT: -ear pain, -nasal congestion, -sore throat  Cardiovascular: -chest pain, -palpitations, -lower extremity edema  Respiratory: -cough, -shortness of breath  Gastrointestinal: +abdominal pain, -nausea, -vomiting, -diarrhea, -constipation, -blood in stool, +change in bowel habits  Genitourinary: -dysuria, -hematuria, -frequency  Musculoskeletal: -joint pain, -muscle pain  Skin: -rash, -lesion  Neurological: -headache, -dizziness, -numbness, -tingling  Psychiatric: +anxiety, -depression, -sleep difficulty          Patient Active Problem List   Diagnosis    PSVT (paroxysmal supraventricular tachycardia)    Diverticulosis    Constipation, chronic    Stress incontinence    Osteopenia    Moderate mixed hyperlipidemia not  requiring statin therapy    Palpitations    Acquired hypothyroidism    History of colonic polyps    Vitamin D deficiency    Surgical menopause on hormone replacement therapy    SOLOMON (dyspnea on exertion)    Abnormal ECG    Nonrheumatic mitral valve regurgitation    Other fatigue    Seasonal allergic rhinitis    Juvenile idiopathic scoliosis of thoracic region    Chronic bilateral low back pain without sciatica    Anterolisthesis of lumbar spine    Pars defect of lumbar spine    Vaginal prolapse    Menopausal vasomotor syndrome    Vaginal vault prolapse    Atrophic vaginitis    Posterior vaginal wall prolapse         Current Outpatient Medications:     [id number] ORAL, , Disp: , Rfl:     azelastine (ASTELIN) 137 mcg (0.1 %) nasal spray, 2 sprays (274 mcg total) by Nasal route 2 (two) times daily., Disp: 60 mL, Rfl: 11    calcium-vitamin D (CALCIUM WITH VITAMIN D) 600 mg-10 mcg (400 unit) Tab, Take 1 tablet by mouth 2 (two) times a day. for 999 doses, Disp: 180 tablet, Rfl: 4    cetirizine (ZYRTEC) 10 MG tablet, Take 1 tablet (10 mg total) by mouth once daily., Disp: , Rfl:     levOCARNitine (L-CARNITINE) 500 mg Cap, Take 500 mg by mouth., Disp: , Rfl:     liothyronine (CYTOMEL) 5 MCG Tab, Take 1 tablet (5 mcg total) by mouth 2 (two) times a day., Disp: 180 tablet, Rfl: 2    multivitamin (THERAGRAN) per tablet, Take 1 tablet by mouth 2 (two) times daily., Disp: , Rfl:     polyethylene glycol (GLYCOLAX) 17 gram PwPk, Take by mouth., Disp: , Rfl:     psyllium 0.52 gram capsule, Take 0.52 g by mouth once daily., Disp: , Rfl:     TURMERIC (CURCUMIN MISC), Take by mouth once daily., Disp: , Rfl:     turmeric-ging-olive-oreg-capry 100 mg-150 mg- 50 mg-150 mg Cap, Take by mouth., Disp: , Rfl:     vibegron (GEMTESA) 75 mg Tab, Take 1 tablet (75 mg total) by mouth nightly., Disp: 90 tablet, Rfl: 3    estradioL (ESTRACE) 0.5 MG tablet, Take 1 tablet (0.5 mg total) by mouth once daily., Disp: 90 tablet, Rfl:  "1    fluticasone propionate (FLONASE) 50 mcg/actuation nasal spray, 2 sprays (100 mcg total) by Each Nostril route once daily., Disp: , Rfl:     levothyroxine (SYNTHROID) 50 MCG tablet, Take 1 tablet (50 mcg total) by mouth before breakfast., Disp: 90 tablet, Rfl: 2    montelukast (SINGULAIR) 10 mg tablet, Take 1 tablet (10 mg total) by mouth every evening., Disp: 90 tablet, Rfl: 3    propranoloL (INDERAL) 20 MG tablet, Take 1 tablet (20 mg total) by mouth 2 (two) times daily as needed (for heart palpitaitons)., Disp: 90 tablet, Rfl: 2      Objective:   /80 (BP Location: Left arm, Patient Position: Sitting, BP Method: Small (Manual))   Pulse 76   Temp 96.7 °F (35.9 °C) (Tympanic)   Ht 5' 4" (1.626 m)   Wt 63.7 kg (140 lb 6.9 oz)   SpO2 (!) 93%   BMI 24.11 kg/m²     Physical Exam    General: In no acute distress.  Head: Normocephalic. Non traumatic.  Eyes: PERRLA. EOMs full. Conjunctivae clear. Fundi grossly normal.  Ears: EACs clear. TMs normal.  Nose: Mucosa pink. Mucosa moist. No obstruction.  Throat: Clear. No exudates. No lesions.  Neck: Supple. No masses. No thyromegaly. No bruits.  Chest: Lungs clear. No rales. No rhonchi. No wheezes.  Heart: RRR. No murmurs. No rubs. No gallops.  Abdomen: Soft. Tenderness to right lateral middle abdominal area upon palpation. Denies rebound tenderness; stating the pain only occurred when I press in. Mild tenderness upon palpation to right lower abdominal area near pelvis upon palpation.  No rebound tenderness noted. No masses. BS hyperactive.    : Normal external genitalia. No lesions. No discharge. No hernias  noted.  Back: Normal curvature. No scoliosis. No tenderness.  Extremities: Warm. Well perfused. No upper extremity edema. No lower extremity edema. FROM. No deformities. No joint erythema.  Neuro: No focal deficits appreciated. Good muscle tone. Normal response to visual stimuli. Normal response to auditory stimuli.  Skin: Normal. No rashes. No lesions " noted.          Assessment:     1. Encounter for preventive health examination    2. Menopausal vasomotor syndrome    3. Seasonal allergic rhinitis, unspecified trigger    4. Acquired hypothyroidism    5. Benign palpitations    6. Screening for hyperlipidemia    7. Hyperlipidemia, unspecified hyperlipidemia type    8. Right lower quadrant abdominal pain      Plan:   Assessment & Plan    Acknowledged patient's concerns about diverticulosis and potential sigmoid colon resection  Noted Dr. Adames's recommendation for surgery to remove affected section of colon  Recognized patient's desire for 2nd and 3rd opinions before proceeding with surgery  Understood patient's fear of potential colostomy and preference for conservative management  RLQ ABDOMINAL PAIN  - Flat and erect of Abdomen  CONSTIPATION:  - Continued MiraLAX.  - Continued Metamucil.          Follow up if symptoms worsen or fail to improve.

## 2024-08-27 ENCOUNTER — PATIENT MESSAGE (OUTPATIENT)
Dept: INTERNAL MEDICINE | Facility: CLINIC | Age: 69
End: 2024-08-27
Payer: MEDICARE

## 2024-08-27 ENCOUNTER — TELEPHONE (OUTPATIENT)
Dept: INTERNAL MEDICINE | Facility: CLINIC | Age: 69
End: 2024-08-27
Payer: MEDICARE

## 2024-08-27 DIAGNOSIS — R10.31 RIGHT LOWER QUADRANT PAIN: Primary | ICD-10-CM

## 2024-08-27 RX ORDER — ESTRADIOL 0.5 MG/1
0.5 TABLET ORAL DAILY
Qty: 90 TABLET | Refills: 1 | Status: SHIPPED | OUTPATIENT
Start: 2024-08-27

## 2024-08-27 RX ORDER — PROPRANOLOL HYDROCHLORIDE 20 MG/1
20 TABLET ORAL 2 TIMES DAILY PRN
Qty: 90 TABLET | Refills: 2 | Status: SHIPPED | OUTPATIENT
Start: 2024-08-27

## 2024-08-27 RX ORDER — LEVOTHYROXINE SODIUM 50 UG/1
50 TABLET ORAL
Qty: 90 TABLET | Refills: 2 | Status: SHIPPED | OUTPATIENT
Start: 2024-08-27

## 2024-08-27 RX ORDER — FLUTICASONE PROPIONATE 50 MCG
2 SPRAY, SUSPENSION (ML) NASAL DAILY
COMMUNITY
Start: 2024-08-27

## 2024-08-27 RX ORDER — MONTELUKAST SODIUM 10 MG/1
10 TABLET ORAL NIGHTLY
Qty: 90 TABLET | Refills: 3 | Status: SHIPPED | OUTPATIENT
Start: 2024-08-27 | End: 2027-05-23

## 2024-08-27 NOTE — TELEPHONE ENCOUNTER
Spoke with patient regarding abdominal xray results.  Patient states she is more than sure she had a her appendix removed when she had her hyst. Patient reports she had a bowel movement after she left here and her (RLQ) feels better.  States she is skeptical about having another CT abdomen so soon.  Discussed with patient will order CT Abdomen in the event she changes her mind.  Reports she is also concerned about her elevated triglycerides levels.  Admits to not walking in months.  Discuss with patient to resume walking, low carb diet, take an OTC fish oil  and Vitamin D3.  Patient denies alcohol use.  Discussed with patient to follow up in 3 months.  Patient agreed with plan of care and verbalized understanging

## 2024-08-27 NOTE — TELEPHONE ENCOUNTER
----- Message from Annamaria Myers sent at 8/27/2024  9:31 AM CDT -----    Type:  Needs Medical Advice    Who Called: ryan   Symptoms (please be specific):    How long has patient had these symptoms:    Pharmacy name and phone #:    Would the patient rather a call back or a response via [facility]?   Best Call Back Number: [phone]  Additional Information:     Patient call Mrs Spauldingse

## 2024-08-27 NOTE — TELEPHONE ENCOUNTER
Spoke with patient regarding her RLQ abdominal pain.  Patient reports RLQ/right pelvic are pain started on 8/25 or 8/26.  Discussed with patient the need for having a follow up CT of abdomen 2/2 now having air fluid levels in her abdomen, on the side where she expressed concern for abdominal pain to rule out appendicitis or any infectious cause.  Patient states she is  more than certain she does not have an appendix.  Patient states when she left here she had a large bowel movement and feels much better.  Patient reports she believes her gall bladder was removed when she had her hyst. Patient denies having abdominal pain since defecating.  Patient reports she is very hesitant about having so many CT scans back to  back.  Discussed with patient the importance of having the CT scan performed and I will order scan for her to complete should her symptoms return.  Patient denied fever, chills, abdominal pain, N/V, or body aches at this time.     Also spoke with patient regarding her concern for her elevated triglycerides.  Patient reports she stopped being as active several months ago.  Reports she used to ride for miles daily but stopped several months ago.   Discussed with patient to resume bike riding, take OTC fish oil/omega 3 fatty acid pill, choose healthier fats and carbs, eat more fiber, nuts, use olive oil, and decrease alcohol intake.  Patient reports she does not drink.  Discussed with patient we will continue to monitor her level for 3 months before making a decision to place on a statin. Patient agreed with plan of care and verbalizing understandig

## 2024-08-29 ENCOUNTER — PATIENT MESSAGE (OUTPATIENT)
Dept: INTERNAL MEDICINE | Facility: CLINIC | Age: 69
End: 2024-08-29
Payer: MEDICARE

## 2024-09-03 ENCOUNTER — PATIENT MESSAGE (OUTPATIENT)
Dept: INTERNAL MEDICINE | Facility: CLINIC | Age: 69
End: 2024-09-03
Payer: MEDICARE

## 2024-09-04 NOTE — TELEPHONE ENCOUNTER
Would you please send a message to [facility] advising them all of her medications can be generically filled?  Thanks you

## 2024-09-06 ENCOUNTER — PATIENT MESSAGE (OUTPATIENT)
Dept: CARDIOLOGY | Facility: CLINIC | Age: 69
End: 2024-09-06
Payer: MEDICARE

## 2024-09-06 ENCOUNTER — TELEPHONE (OUTPATIENT)
Dept: CARDIOLOGY | Facility: CLINIC | Age: 69
End: 2024-09-06
Payer: MEDICARE

## 2024-09-06 DIAGNOSIS — R00.2 PALPITATIONS: ICD-10-CM

## 2024-09-06 DIAGNOSIS — R94.31 ABNORMAL ECG: Primary | ICD-10-CM

## 2024-09-06 DIAGNOSIS — I47.10 PSVT (PAROXYSMAL SUPRAVENTRICULAR TACHYCARDIA): ICD-10-CM

## 2024-09-06 NOTE — TELEPHONE ENCOUNTER
Contacted PT to get all the information on surgeon and fax information, Dr Darden with [facility] in [place] will be performing the procedure.

## 2024-09-06 NOTE — TELEPHONE ENCOUNTER
Pt has not been seen in [facility] since Feb 2023.  Needs preop eval clinic appt with me or any available provider/mid level for clearance.    Ni Rosas LPN sent to Tae Zaragoza MD  Caller: Unspecified (Today,  8:55 AM)  Dr. Zaragoza PT scheduled for a Sigmoid Colon Resection on September 27th and a colonoscopy on September 17 the week before.  PT stated her EKG came back abnormal again as last time you met. The anesthesia department is requesting you look at and give PT clearance or followup to investigate. PT stated that Dr. Rashid sent you via fax a copy of the EKG, I have not seen the EKG that they said they faxed.    Thank You  Ni Mckinley LPN

## 2024-09-09 ENCOUNTER — OFFICE VISIT (OUTPATIENT)
Dept: CARDIOLOGY | Facility: CLINIC | Age: 69
End: 2024-09-09
Payer: MEDICARE

## 2024-09-09 ENCOUNTER — HOSPITAL ENCOUNTER (OUTPATIENT)
Dept: CARDIOLOGY | Facility: HOSPITAL | Age: 69
Discharge: HOME | End: 2024-09-09
Payer: MEDICARE

## 2024-09-09 VITALS
SYSTOLIC BLOOD PRESSURE: 130 MMHG | HEART RATE: 73 BPM | WEIGHT: 139.25 LBS | DIASTOLIC BLOOD PRESSURE: 82 MMHG | BODY MASS INDEX: 23.77 KG/M2 | OXYGEN SATURATION: 100 % | HEIGHT: 64 IN

## 2024-09-09 DIAGNOSIS — K57.90 DIVERTICULOSIS: ICD-10-CM

## 2024-09-09 DIAGNOSIS — Z01.810 PREOP CARDIOVASCULAR EXAM: Primary | ICD-10-CM

## 2024-09-09 DIAGNOSIS — R00.2 PALPITATIONS: ICD-10-CM

## 2024-09-09 DIAGNOSIS — R94.31 ABNORMAL ECG: ICD-10-CM

## 2024-09-09 DIAGNOSIS — I47.10 PSVT (PAROXYSMAL SUPRAVENTRICULAR TACHYCARDIA): ICD-10-CM

## 2024-09-09 DIAGNOSIS — I34.0 NONRHEUMATIC MITRAL VALVE REGURGITATION: ICD-10-CM

## 2024-09-09 PROCEDURE — 3079F DIAST BP 80-89 MM HG: CPT | Mod: CPTII,S$GLB,, | Performed by: NURSE PRACTITIONER

## 2024-09-09 PROCEDURE — 93010 ELECTROCARDIOGRAM REPORT: CPT | Mod: ,,, | Performed by: INTERNAL MEDICINE

## 2024-09-09 PROCEDURE — 3075F SYST BP GE 130 - 139MM HG: CPT | Mod: CPTII,S$GLB,, | Performed by: NURSE PRACTITIONER

## 2024-09-09 PROCEDURE — 3044F HG A1C LEVEL LT 7.0%: CPT | Mod: CPTII,S$GLB,, | Performed by: NURSE PRACTITIONER

## 2024-09-09 PROCEDURE — 3288F FALL RISK ASSESSMENT DOCD: CPT | Mod: CPTII,S$GLB,, | Performed by: NURSE PRACTITIONER

## 2024-09-09 PROCEDURE — 1126F AMNT PAIN NOTED NONE PRSNT: CPT | Mod: CPTII,S$GLB,, | Performed by: NURSE PRACTITIONER

## 2024-09-09 PROCEDURE — 99999 PR PBB SHADOW E&M-EST. PATIENT-LVL IV: CPT | Mod: PBBFAC,,, | Performed by: NURSE PRACTITIONER

## 2024-09-09 PROCEDURE — 93005 ELECTROCARDIOGRAM TRACING: CPT

## 2024-09-09 PROCEDURE — 99215 OFFICE O/P EST HI 40 MIN: CPT | Mod: S$GLB,,, | Performed by: NURSE PRACTITIONER

## 2024-09-09 PROCEDURE — 1159F MED LIST DOCD IN RCRD: CPT | Mod: CPTII,S$GLB,, | Performed by: NURSE PRACTITIONER

## 2024-09-09 PROCEDURE — 3008F BODY MASS INDEX DOCD: CPT | Mod: CPTII,S$GLB,, | Performed by: NURSE PRACTITIONER

## 2024-09-09 PROCEDURE — 1100F PTFALLS ASSESS-DOCD GE2>/YR: CPT | Mod: CPTII,S$GLB,, | Performed by: NURSE PRACTITIONER

## 2024-09-09 RX ORDER — CYANOCOBALAMIN (VITAMIN B-12) 1000 MCG
TABLET, SUBLINGUAL SUBLINGUAL
COMMUNITY

## 2024-09-09 RX ORDER — METRONIDAZOLE 500 MG/1
TABLET ORAL
COMMUNITY
Start: 2024-09-04

## 2024-09-09 RX ORDER — MELATONIN 3 MG
TABLET ORAL
COMMUNITY

## 2024-09-09 RX ORDER — DICYCLOMINE HYDROCHLORIDE 10 MG/5ML
SOLUTION ORAL
COMMUNITY

## 2024-09-09 NOTE — PROGRESS NOTES
Subjective:   Patient ID:  Mone Best is a 68 y.o. female who presents for evaluation of No chief complaint on file.      HPI    Mone Best is a 68 year old female who presents to clinic for preop risk stratification.   Her current medical conditions include palpitations, PSVT, IGT, HLP, Thyroid disease.     Using propranolol PRN for palpitations- takes a few times per week.   She does endorse intolerance of high temp while showering    Otherwise, denies any CV complaints.     Denies chest pain or anginal equivalents. No shortness of breath, SOLOMON or palpitations. Denies orthopnea, PND or abdominal bloating. Reports regular walking without any issues lately. NO leg swelling or claudications. No recent falls, syncope or near syncopal events. Reports compliance with medications and dietary restrictions. NO CNS complaints to suggest TIA or CVA today. No signs of abnormal bleeding.       Past Medical History:   Diagnosis Date    Acquired female bladder prolapse 02/2024    Constipation, chronic     Diverticulosis     Glucose intolerance (impaired glucose tolerance) 05/03/2013    Hyperlipidemia 05/03/2013    Osteopenia     PSVT (paroxysmal supraventricular tachycardia)     Stress incontinence     Thyroid disease        Past Surgical History:   Procedure Laterality Date    APPENDECTOMY      BLADDER SUSPENSION      BREAST BIOPSY Right     fibroadenoma    BREAST BIOPSY Left 12/2023    benign    BREAST SURGERY      R breast biopsy- benign (removed fibroid adenoma)    COLONOSCOPY N/A 12/16/2022    Procedure: COLONOSCOPY;  Surgeon: Tad Adames MD;  Location: [facility];  Service: General;  Laterality: N/A;    COLONOSCOPY W/ BIOPSIES      polyps were negative    HYSTERECTOMY      OOPHORECTOMY      TONSILLECTOMY      VAGINAL HYSTERECTOMY W/ ANTERIOR AND POSTERIOR VAGINAL REPAIR         Social History     Tobacco Use    Smoking status: Never     Passive exposure: Never    Smokeless tobacco: Never   Substance  Use Topics    Alcohol use: Yes     Alcohol/week: 3.0 standard drinks of alcohol     Types: 3 Glasses of wine per week    Drug use: No       Family History   Problem Relation Name Age of Onset    Depression Mother gi bleed     Diabetes Mother gi bleed     Diverticulitis Mother gi bleed     Diabetes Father      Hypertension Father      Diverticulitis Father      Melanoma Neg Hx         Wt Readings from Last 3 Encounters:   09/09/24 63.2 kg (139 lb 3.5 oz)   08/26/24 63.7 kg (140 lb 6.9 oz)   08/05/24 62.9 kg (138 lb 9 oz)     Temp Readings from Last 3 Encounters:   08/26/24 96.7 °F (35.9 °C) (Tympanic)   08/05/24 98.2 °F (36.8 °C) (Oral)   07/24/24 98.2 °F (36.8 °C) (Oral)     BP Readings from Last 3 Encounters:   09/09/24 130/82   08/26/24 116/80   08/05/24 128/82     Pulse Readings from Last 3 Encounters:   09/09/24 73   08/26/24 76   08/05/24 75       Current Outpatient Medications on File Prior to Visit   Medication Sig Dispense Refill    5-hydroxytryptophan, 5-HTP, 200 mg TbSR       azelastine (ASTELIN) 137 mcg (0.1 %) nasal spray 2 sprays (274 mcg total) by Nasal route 2 (two) times daily. 60 mL 11    calcium-vitamin D (CALCIUM WITH VITAMIN D) 600 mg-10 mcg (400 unit) Tab Take 1 tablet by mouth 2 (two) times a day. for 999 doses 180 tablet 4    cetirizine (ZYRTEC) 10 MG tablet Take 1 tablet (10 mg total) by mouth once daily.      cyanocobalamin, vitamin B-12, 500 mcg Subl       dicyclomine (BENTYL) 10 mg/5 mL syrup       estradioL (ESTRACE) 0.5 MG tablet Take 1 tablet (0.5 mg total) by mouth once daily. 90 tablet 1    fluticasone propionate (FLONASE) 50 mcg/actuation nasal spray 2 sprays (100 mcg total) by Each Nostril route once daily.      levocarnitine HCl (ACETYL-L-CARNITINE MISC)       levothyroxine (SYNTHROID) 50 MCG tablet Take 1 tablet (50 mcg total) by mouth before breakfast. 90 tablet 2    liothyronine (CYTOMEL) 5 MCG Tab Take 1 tablet (5 mcg total) by mouth 2 (two) times a day. 180 tablet 2     montelukast (SINGULAIR) 10 mg tablet Take 1 tablet (10 mg total) by mouth every evening. 90 tablet 3    multivitamin (THERAGRAN) per tablet Take 1 tablet by mouth 2 (two) times daily.      polyethylene glycol (GLYCOLAX) 17 gram PwPk Take by mouth.      propranoloL (INDERAL) 20 MG tablet Take 1 tablet (20 mg total) by mouth 2 (two) times daily as needed (for heart palpitaitons). 90 tablet 2    psyllium 0.52 gram capsule Take 0.52 g by mouth once daily.      TURMERIC (CURCUMIN MISC) Take by mouth once daily.      vibegron (GEMTESA) 75 mg Tab Take 1 tablet (75 mg total) by mouth nightly. 90 tablet 3    5-HYDROXYTRYPTOPHAN,5HTP,-B6-C ORAL  (Patient not taking: Reported on 9/9/2024)      [id number] ORAL  (Patient not taking: Reported on 9/9/2024)      levOCARNitine (L-CARNITINE) 500 mg Cap Take 500 mg by mouth. (Patient not taking: Reported on 9/9/2024)      metroNIDAZOLE (FLAGYL) 500 MG tablet Day prior to surgery take 1 pill at 1100, 200 pm, and 1000 pm (Patient not taking: Reported on 9/9/2024)      turmeric-ging-olive-oreg-capry 100 mg-150 mg- 50 mg-150 mg Cap Take by mouth. (Patient not taking: Reported on 9/9/2024)       No current facility-administered medications on file prior to visit.       No cardiac monitor results found for the past 12 months    Results for orders placed during the hospital encounter of 03/30/23    Echo    Interpretation Summary  · The left ventricle is normal in size with concentric remodeling and normal systolic function.  · Mild mitral regurgitation.  · The estimated ejection fraction is 60%.  · Normal left ventricular diastolic function.  · Normal right ventricular size with normal right ventricular systolic function.  · Normal central venous pressure (3 mmHg).  · The estimated PA systolic pressure is 28 mmHg.    Results for orders placed during the hospital encounter of 03/30/23    Nuclear Stress - Cardiology Interpreted    Interpretation Summary    Normal myocardial  perfusion scan. There is no evidence of myocardial ischemia or infarction.    The gated perfusion images showed an ejection fraction of 75% at rest. The gated perfusion images showed an ejection fraction of 91% post stress.    The ECG portion of the study is negative for ischemia.    The patient reported no chest pain during the stress test.    During stress, rare PVCs are noted.    The patient exercised for 6 minutes 30 seconds on a Dionicio protocol, corresponding to a functional capacity of 7 METS, achieving a peak heart rate of 130 bpm, which is 88 % of the age predicted maximum heart rate.    Stress ECG: There was hypertensive blood pressure response with stress.        Results for orders placed or performed during the hospital encounter of 01/26/23   EKG 12-lead    Collection Time: 01/26/23  1:15 PM    Narrative    Test Reason : R00.2    Vent. Rate : 070 BPM     Atrial Rate : 070 BPM     P-R Int : 188 ms          QRS Dur : 078 ms      QT Int : 388 ms       P-R-T Axes : 044 011 011 degrees     QTc Int : 419 ms    Normal sinus rhythm  Cannot rule out Anterior infarct ,age undetermined  Abnormal ECG  When compared with ECG of 21-OCT-2022 07:47,  No significant change was found  Confirmed by URIEL PATEL MD ([id number]) on 1/27/2023 7:57:15 AM    Referred By: RUMA SANCEHZ           Confirmed By:URIEL PATEL MD         Review of Systems   Constitutional: Negative for malaise/fatigue.   HENT:  Negative for hearing loss and hoarse voice.    Eyes:  Negative for blurred vision and visual disturbance.   Cardiovascular:  Negative for chest pain, claudication, dyspnea on exertion, irregular heartbeat, leg swelling, near-syncope, orthopnea, palpitations, paroxysmal nocturnal dyspnea and syncope.   Respiratory:  Negative for cough, hemoptysis, shortness of breath, sleep disturbances due to breathing, snoring and wheezing.    Endocrine: Negative for cold intolerance and heat intolerance.   Hematologic/Lymphatic: Does not  bruise/bleed easily.   Skin:  Negative for color change, dry skin and nail changes.   Musculoskeletal:  Positive for arthritis and back pain. Negative for joint pain and myalgias.   Gastrointestinal:  Negative for bloating, abdominal pain, constipation, nausea and vomiting.   Genitourinary:  Negative for dysuria, flank pain, hematuria and hesitancy.   Neurological:  Negative for headaches, light-headedness, loss of balance, numbness, paresthesias and weakness.   Psychiatric/Behavioral:  Negative for altered mental status.    Allergic/Immunologic: Negative for environmental allergies.         Objective:   Physical Exam  Vitals and nursing note reviewed.   Constitutional:       General: She is not in acute distress.     Appearance: Normal appearance. She is well-developed. She is not ill-appearing.   HENT:      Head: Normocephalic and atraumatic.      Nose: Nose normal.      Mouth/Throat:      Mouth: Mucous membranes are moist.   Eyes:      Pupils: Pupils are equal, round, and reactive to light.   Neck:      Thyroid: No thyromegaly.      Vascular: No JVD.      Trachea: No tracheal deviation.   Cardiovascular:      Rate and Rhythm: Normal rate and regular rhythm.      Chest Wall: PMI is not displaced.      Pulses: Intact distal pulses.           Radial pulses are 2+ on the right side and 2+ on the left side.        Dorsalis pedis pulses are 2+ on the right side and 2+ on the left side.      Heart sounds: S1 normal and S2 normal. Heart sounds not distant. No murmur heard.  Pulmonary:      Effort: Pulmonary effort is normal. No respiratory distress.      Breath sounds: Normal breath sounds. No wheezing.   Abdominal:      General: Bowel sounds are normal. There is no distension.      Palpations: Abdomen is soft.      Tenderness: There is no abdominal tenderness.   Musculoskeletal:         General: No swelling. Normal range of motion.      Cervical back: Full passive range of motion without pain, normal range of motion and  neck supple.      Right lower leg: No edema.      Left lower leg: No edema.      Right ankle: No swelling.      Left ankle: No swelling.   Skin:     General: Skin is warm and dry.      Capillary Refill: Capillary refill takes less than 2 seconds.      Nails: There is no clubbing.   Neurological:      General: No focal deficit present.      Mental Status: She is alert and oriented to person, place, and time.      Motor: No weakness.   Psychiatric:         Speech: Speech normal.         Behavior: Behavior normal.         Thought Content: Thought content normal.         Judgment: Judgment normal.         Lab Results   Component Value Date    CHOL 189 08/26/2024    CHOL 193 10/21/2022    CHOL 174 08/09/2022     Lab Results   Component Value Date    HDL 42 08/26/2024    HDL 45 10/21/2022    HDL 44 08/09/2022     Lab Results   Component Value Date    LDLCALC 91.0 08/26/2024    LDLCALC 124.4 10/21/2022    LDLCALC 110.0 08/09/2022     Lab Results   Component Value Date    TRIG 280 (H) 08/26/2024    TRIG 118 10/21/2022    TRIG 100 08/09/2022     Lab Results   Component Value Date    CHOLHDL 22.2 08/26/2024    CHOLHDL 23.3 10/21/2022    CHOLHDL 25.3 08/09/2022       Chemistry        Component Value Date/Time     09/04/2024 1323     10/21/2022 0743    K 3.9 09/04/2024 1323    K 4.3 10/21/2022 0743     09/04/2024 1323     10/21/2022 0743    CO2 29 09/04/2024 1323    CO2 26 10/21/2022 0743    BUN 11 09/04/2024 1323    BUN 17 10/21/2022 0743    CREATININE 0.68 09/04/2024 1323    CREATININE 0.7 07/24/2024 0934    GLU 95 10/21/2022 0743        Component Value Date/Time    CALCIUM 10.2 09/04/2024 1323    CALCIUM 9.3 10/21/2022 0743    ALKPHOS 46 (L) 10/21/2022 0743    AST 10 10/21/2022 0743    ALT 8 (L) 10/21/2022 0743    BILITOT 0.4 10/21/2022 0743    ESTGFRAFRICA 96 09/04/2024 1323    ESTGFRAFRICA >60 10/26/2021 0801    EGFRNONAA >60 10/26/2021 0801          Lab Results   Component Value Date    TSH 1.150  08/26/2024     Lab Results   Component Value Date    INR 1.0 09/04/2024     Lab Results   Component Value Date    WBC 7.44 10/21/2022    HGB 14.0 10/21/2022    HCT 40.5 10/21/2022    MCV 88 10/21/2022     10/21/2022        Assessment:      1. Preop cardiovascular exam    2. Nonrheumatic mitral valve regurgitation    3. Palpitations    4. Abnormal ECG    5. Diverticulosis        Plan:     Can proceed with Colonoscopy and Surgery at low CV risk  Ensure dose of BB on AM of surgery    Dash diet 2 gm sodium restriction  Profile BP at home  Call if BP >140/90    RTC PRN  Call if any issues post op or in future.     FADY Bran  [facility]

## 2024-09-10 LAB
OHS QRS DURATION: 80 MS
OHS QTC CALCULATION: 418 MS

## 2024-11-15 ENCOUNTER — OFFICE VISIT (OUTPATIENT)
Dept: INTERNAL MEDICINE | Facility: CLINIC | Age: 69
End: 2024-11-15
Payer: MEDICARE

## 2024-11-15 VITALS
DIASTOLIC BLOOD PRESSURE: 78 MMHG | HEART RATE: 79 BPM | HEIGHT: 64 IN | RESPIRATION RATE: 18 BRPM | WEIGHT: 129 LBS | BODY MASS INDEX: 22.02 KG/M2 | OXYGEN SATURATION: 100 % | SYSTOLIC BLOOD PRESSURE: 122 MMHG

## 2024-11-15 DIAGNOSIS — R61 NIGHT SWEAT: ICD-10-CM

## 2024-11-15 DIAGNOSIS — E03.9 ACQUIRED HYPOTHYROIDISM: Chronic | ICD-10-CM

## 2024-11-15 DIAGNOSIS — A04.72 CLOSTRIDIUM DIFFICILE COLITIS: Primary | ICD-10-CM

## 2024-11-15 DIAGNOSIS — J30.2 SEASONAL ALLERGIC RHINITIS, UNSPECIFIED TRIGGER: Chronic | ICD-10-CM

## 2024-11-15 DIAGNOSIS — I47.10 PSVT (PAROXYSMAL SUPRAVENTRICULAR TACHYCARDIA): Chronic | ICD-10-CM

## 2024-11-15 DIAGNOSIS — M85.89 OSTEOPENIA OF MULTIPLE SITES: Chronic | ICD-10-CM

## 2024-11-15 DIAGNOSIS — R00.2 PALPITATIONS: Chronic | ICD-10-CM

## 2024-11-15 DIAGNOSIS — E78.2 MODERATE MIXED HYPERLIPIDEMIA NOT REQUIRING STATIN THERAPY: Chronic | ICD-10-CM

## 2024-11-15 DIAGNOSIS — N95.1 MENOPAUSAL VASOMOTOR SYNDROME: Chronic | ICD-10-CM

## 2024-11-15 PROCEDURE — 3008F BODY MASS INDEX DOCD: CPT | Mod: CPTII,S$GLB,, | Performed by: FAMILY MEDICINE

## 2024-11-15 PROCEDURE — 3074F SYST BP LT 130 MM HG: CPT | Mod: CPTII,S$GLB,, | Performed by: FAMILY MEDICINE

## 2024-11-15 PROCEDURE — 99214 OFFICE O/P EST MOD 30 MIN: CPT | Mod: S$GLB,,, | Performed by: FAMILY MEDICINE

## 2024-11-15 PROCEDURE — 1101F PT FALLS ASSESS-DOCD LE1/YR: CPT | Mod: CPTII,S$GLB,, | Performed by: FAMILY MEDICINE

## 2024-11-15 PROCEDURE — 1160F RVW MEDS BY RX/DR IN RCRD: CPT | Mod: CPTII,S$GLB,, | Performed by: FAMILY MEDICINE

## 2024-11-15 PROCEDURE — 99999 PR PBB SHADOW E&M-EST. PATIENT-LVL V: CPT | Mod: PBBFAC,,, | Performed by: FAMILY MEDICINE

## 2024-11-15 PROCEDURE — 1126F AMNT PAIN NOTED NONE PRSNT: CPT | Mod: CPTII,S$GLB,, | Performed by: FAMILY MEDICINE

## 2024-11-15 PROCEDURE — 3044F HG A1C LEVEL LT 7.0%: CPT | Mod: CPTII,S$GLB,, | Performed by: FAMILY MEDICINE

## 2024-11-15 PROCEDURE — 3078F DIAST BP <80 MM HG: CPT | Mod: CPTII,S$GLB,, | Performed by: FAMILY MEDICINE

## 2024-11-15 PROCEDURE — G2211 COMPLEX E/M VISIT ADD ON: HCPCS | Mod: S$GLB,,, | Performed by: FAMILY MEDICINE

## 2024-11-15 PROCEDURE — 3288F FALL RISK ASSESSMENT DOCD: CPT | Mod: CPTII,S$GLB,, | Performed by: FAMILY MEDICINE

## 2024-11-15 PROCEDURE — 1159F MED LIST DOCD IN RCRD: CPT | Mod: CPTII,S$GLB,, | Performed by: FAMILY MEDICINE

## 2024-11-15 RX ORDER — PROPRANOLOL HYDROCHLORIDE 20 MG/1
20 TABLET ORAL 2 TIMES DAILY PRN
Qty: 90 TABLET | Refills: 2 | Status: SHIPPED | OUTPATIENT
Start: 2024-11-15

## 2024-11-15 RX ORDER — LEVOTHYROXINE SODIUM 50 UG/1
50 TABLET ORAL
Qty: 90 TABLET | Refills: 2 | Status: SHIPPED | OUTPATIENT
Start: 2024-11-15

## 2024-11-15 RX ORDER — ACETAMINOPHEN 500 MG
1000 TABLET ORAL EVERY 6 HOURS PRN
COMMUNITY

## 2024-11-15 RX ORDER — MONTELUKAST SODIUM 10 MG/1
10 TABLET ORAL NIGHTLY
Qty: 90 TABLET | Refills: 2 | Status: SHIPPED | OUTPATIENT
Start: 2024-11-15 | End: 2027-08-11

## 2024-11-15 RX ORDER — ESTRADIOL 0.5 MG/1
0.5 TABLET ORAL DAILY
Qty: 90 TABLET | Refills: 2 | Status: SHIPPED | OUTPATIENT
Start: 2024-11-15

## 2024-11-15 RX ORDER — ACETAMINOPHEN 500 MG
TABLET ORAL
COMMUNITY
Start: 2024-10-02

## 2024-11-15 RX ORDER — LIOTHYRONINE SODIUM 5 UG/1
5 TABLET ORAL 2 TIMES DAILY
Qty: 180 TABLET | Refills: 2 | Status: SHIPPED | OUTPATIENT
Start: 2024-11-15

## 2024-11-15 NOTE — ASSESSMENT & PLAN NOTE
"Lab Results   Component Value Date    TSH 1.150 08/26/2024    TSH 1.461 02/06/2024    TSH 0.024 (L) 08/29/2023    FREET4 0.78 08/26/2024    FREET4 0.96 08/29/2023    FREET4 0.91 01/26/2023    W3CGNSR 105 08/26/2024    F2PAAWF 127 08/29/2023    K4NGISX 82.66 05/03/2013    T3FREE 2.9 09/23/2020     No results found for: "THYROIDSTIMI", "THYROPEROXID", "THYGLBTUM", "THGABSCRN", "THYRGINTERP"   "

## 2024-11-15 NOTE — PROGRESS NOTES
OFFICE VISIT 11/15/24  1:20 PM CST    CHIEF COMPLAINT: Follow-up    SUBJECTIVE  CHIEF COMPLAINT:  Mone presents today for follow-up of clostridium difficile colitis.    CLOSTRIDIUM DIFFICILE COLITIS:  She completed a 10-day course of Vancomycin, noting improvement around day 8 with more formed stools. Recently, she had concerns about a possible recurrence due to experiencing some diarrhea and increased mucus in stool, but symptoms improved by Thursday. Currently, she reports occasional diarrhea and mucus in stool, with bowel urgency after dinner, particularly following larger meals. She denies fever.    GASTROINTESTINAL SYMPTOMS:  She experiences abdominal pain and cramping after eating, which varies depending on food intake. She describes an uncomfortable feeling over the abdominal lining. She also reports belching after eating and sometimes during the day.    MEDICAL HISTORY:  She has a recent history of diverticulitis, which led to a colonoscopy and subsequent colon surgery. She received strong antibiotics before both procedures within a week of each other, expressing concern about depletion of normal gut isela. She acknowledges the necessity of antibiotics before the colonoscopy due to increased risk of perforation following diverticulitis, but expresses regret about the colon surgery due to post-operative complications, specifically C. difficile infection.    WEIGHT AND APPETITE:  She reports significant weight loss, currently at 126 lbs, the lowest in a long time. She notes a reduced appetite and challenges herself to eat a few times per week, approximately 3 times.    DIET:  Her breakfast typically consists of eggs and avocado toast for protein intake, occasionally alternating with different egg preparations and cereal. For lunch, she sometimes skips the meal, but when eating, she typically has half a sandwich with tuna or turkey and cheese, accompanied by carrots and hummus. Dinner usually consists of fish  or chicken. She reports difficulty avoiding meat due to it causing aggravation. She denies taking protein supplements.    SLEEP:  She experiences night sweats between 12:00 AM and 3:00 AM, sometimes requiring her to change clothes and disturbing her sleep.    CURRENT MEDICATIONS:  - Estrace (not significantly helping with night sweats)  - Levothyroxine 50 mcg daily  - Cytomel 5 mcg twice daily  - Propranolol 20 mg as needed (taking more frequently in the evening due to heart racing at bedtime)  - Singulair at night  - Melatonin before bed (no significant improvement in sleep)  - 5- mg  - Metamucil in the morning for diverticulitis prevention  - MiraLax every other night or every two days as her colon is still adjusting      OBJECTIVE  Vitals: Weight: 126 lbs.      ASSESSMENT  A04.72 Enterocolitis due to Clostridium difficile, not specified as recurrent  R63.4 Abnormal weight loss  K59.1 Functional diarrhea  Z79.3 Long term (current) use of hormonal contraceptives  E03.9 Hypothyroidism, unspecified  R00.2 Palpitations      PLAN   Assessed patient's recovery from C. difficile colitis, noting improvement in symptoms but persistence of night sweats   Evaluated thyroid function based on August labs, which were within normal limits   Considered night sweats likely related to C. difficile colitis rather than hormonal causes or medication side effects   Determined colorectal surgeon should continue to lead management of C. difficile colitis   Reviewed recent lab results, noting mildly elevated white blood cell count and reassuring kidney function   Assessed weight loss, noting current weight of 126 lbs is significantly lower than usual range of 136-142 lbs   Considered patient's nutritional intake adequate based on reported diet    PATIENT EDUCATION:   Explained melatonin works best when taken 1-2 hours before bedtime   Discussed True Citrus as a more natural alternative to Crystal Light for flavoring water    "Educated on typical resolution of C. difficile symptoms and when further treatment may be necessary    ACTION ITEMS/LIFESTYLE:   Mone to take melatonin 1-2 hours before bedtime if continuing use    MEDICATIONS:   Continued Estrace at current dose   Continued levothyroxine 50 mcg daily   Continued Cytomel 5 mcg twice daily   Continued propranolol 20 mg, allowing for twice daily use if needed   Continued Singulair in the evening   Continued 5- mg   Refilled Cytomel with 9 months of refills   Refilled all managed medications with 9 months of refills    ORDERS:   Standing thyroid labs ordered, allowing for testing up to every 3 months   Standing lipid panel and comprehensive metabolic panel ordered, allowing for testing up to every 6 months   Bone density scan ordered for osteoporosis screening    REFERRALS:   Referred to infectious disease specialist for evaluation of persistent night sweats    FOLLOW UP:   Follow up in 6-7 months for next set of labs   Follow up after next set of labs   Contact office if night sweats resolve before infectious disease appointment to cancel if desired3    1. Clostridium difficile colitis  -     Ambulatory referral/consult to Infectious Disease; Future; Expected date: 11/22/2024    2. Night sweats  -     Ambulatory referral/consult to Infectious Disease; Future; Expected date: 11/22/2024    3. Acquired hypothyroidism  Assessment & Plan:  Lab Results   Component Value Date    TSH 1.150 08/26/2024    TSH 1.461 02/06/2024    TSH 0.024 (L) 08/29/2023    FREET4 0.78 08/26/2024    FREET4 0.96 08/29/2023    FREET4 0.91 01/26/2023    D5YWUSL 105 08/26/2024    O3DMCIY 127 08/29/2023    V1PRMYH 82.66 05/03/2013    T3FREE 2.9 09/23/2020     No results found for: "THYROIDSTIMI", "THYROPEROXID", "THYGLBTUM", "THGABSCRN", "THYRGINTERP"     Orders:  -     levothyroxine (SYNTHROID) 50 MCG tablet; Take 1 tablet (50 mcg total) by mouth before breakfast.  Dispense: 90 tablet; Refill: 2  -     " liothyronine (CYTOMEL) 5 MCG Tab; Take 1 tablet (5 mcg total) by mouth 2 (two) times a day.  Dispense: 180 tablet; Refill: 2  -     T4, Free; Standing  -     TSH; Standing    4. Menopausal vasomotor syndrome  -     estradioL (ESTRACE) 0.5 MG tablet; Take 1 tablet (0.5 mg total) by mouth once daily.  Dispense: 90 tablet; Refill: 2    5. Seasonal allergic rhinitis, unspecified trigger  -     montelukast (SINGULAIR) 10 mg tablet; Take 1 tablet (10 mg total) by mouth every evening.  Dispense: 90 tablet; Refill: 2    6. Benign palpitations  -     propranoloL (INDERAL) 20 MG tablet; Take 1 tablet (20 mg total) by mouth 2 (two) times daily as needed (for heart palpitaitons).  Dispense: 90 tablet; Refill: 2    7. PSVT (paroxysmal supraventricular tachycardia)  -     propranoloL (INDERAL) 20 MG tablet; Take 1 tablet (20 mg total) by mouth 2 (two) times daily as needed (for heart palpitaitons).  Dispense: 90 tablet; Refill: 2    8. Osteopenia of multiple sites  Overview:  DEXA BONE DENSITY SPINE HIP 10/12/2021  FINDINGS: The T score associated with the lumbar spine is 0.1 on the current examination. It was -0.6 on the prior examination. The T score associated with the left femoral neck is -1.5 on the current examination. It was -1.7 on the prior examination. The T score associated with the right femoral neck is -1.8 on the current examination. This area was not studied on the prior examination.  Impression: Osteopenia    Orders:  -     DXA Bone Density Axial Skeleton 1 or more sites; Future; Expected date: 11/15/2024    9. Moderate mixed hyperlipidemia not requiring statin therapy  -     Lipid Panel; Standing  -     Comprehensive Metabolic Panel; Standing    No other significant complaints or concerns were reported.  Unless specified otherwise, chronic conditions are represented as and appear to be compensated/controlled and stable.  Today's visit involved the intricate management of episodic problem(s) and the ongoing care  "for the patient's serious or complex condition(s) listed above, reflecting the inherent complexity of providing longitudinal, comprehensive evaluation and management as the central hub for the patient's primary care services.    Follow up in about 8 months (around 7/15/2025).     Except as noted herein, ROS is otherwise negative.    Vitals:    11/15/24 1312   BP: 122/78   BP Location: Left arm   Patient Position: Sitting   Pulse: 79   Resp: 18   SpO2: 100%   Weight: 58.5 kg (128 lb 15.5 oz)   Height: 5' 4" (1.626 m)   Physical Exam  Vitals reviewed.   Constitutional:       General: She is not in acute distress.     Appearance: Normal appearance. She is not ill-appearing, toxic-appearing or diaphoretic.   HENT:      Head: Normocephalic and atraumatic.   Eyes:      General: No scleral icterus.     Conjunctiva/sclera: Conjunctivae normal.   Cardiovascular:      Rate and Rhythm: Normal rate and regular rhythm.   Pulmonary:      Effort: Pulmonary effort is normal.      Breath sounds: Normal breath sounds.   Abdominal:      General: Bowel sounds are normal.      Palpations: Abdomen is soft.      Tenderness: There is no abdominal tenderness.   Skin:     General: Skin is warm and dry.   Neurological:      Mental Status: She is alert and oriented to person, place, and time. Mental status is at baseline.   Psychiatric:         Mood and Affect: Mood normal.         Behavior: Behavior normal.         Judgment: Judgment normal.       Documentation entered by me for this encounter may have been done in part using speech-recognition technology. Although I have made an effort to ensure accuracy, "sound like" errors may exist and should be interpreted in context.  "

## 2024-11-18 ENCOUNTER — PATIENT MESSAGE (OUTPATIENT)
Dept: INTERNAL MEDICINE | Facility: CLINIC | Age: 69
End: 2024-11-18
Payer: MEDICARE

## 2024-12-02 ENCOUNTER — APPOINTMENT (OUTPATIENT)
Dept: RADIOLOGY | Facility: HOSPITAL | Age: 69
End: 2024-12-02
Attending: FAMILY MEDICINE
Payer: MEDICARE

## 2024-12-02 DIAGNOSIS — M85.89 OSTEOPENIA OF MULTIPLE SITES: Chronic | ICD-10-CM

## 2024-12-02 PROCEDURE — 77080 DXA BONE DENSITY AXIAL: CPT | Mod: 26,,, | Performed by: RADIOLOGY

## 2024-12-02 PROCEDURE — 77080 DXA BONE DENSITY AXIAL: CPT | Mod: TC

## 2024-12-11 ENCOUNTER — TELEPHONE (OUTPATIENT)
Dept: INFECTIOUS DISEASES | Facility: CLINIC | Age: 69
End: 2024-12-11
Payer: MEDICARE

## 2024-12-11 NOTE — TELEPHONE ENCOUNTER
Informed pt that due to book out on 12/12/24 with [facility], r/s needed. Appt r/s. Pt verbalized understanding and agreed to date, time, and location of new appt.

## 2024-12-13 ENCOUNTER — OFFICE VISIT (OUTPATIENT)
Dept: INFECTIOUS DISEASES | Facility: CLINIC | Age: 69
End: 2024-12-13
Payer: MEDICARE

## 2024-12-13 VITALS
BODY MASS INDEX: 22.43 KG/M2 | SYSTOLIC BLOOD PRESSURE: 116 MMHG | TEMPERATURE: 98 F | DIASTOLIC BLOOD PRESSURE: 74 MMHG | HEIGHT: 64 IN | WEIGHT: 131.38 LBS

## 2024-12-13 DIAGNOSIS — E78.2 MODERATE MIXED HYPERLIPIDEMIA NOT REQUIRING STATIN THERAPY: Chronic | ICD-10-CM

## 2024-12-13 DIAGNOSIS — A04.72 CLOSTRIDIUM DIFFICILE COLITIS: ICD-10-CM

## 2024-12-13 DIAGNOSIS — R61 NIGHT SWEAT: ICD-10-CM

## 2024-12-13 DIAGNOSIS — Z22.1 CLOSTRIDIUM DIFFICILE CARRIER: Primary | ICD-10-CM

## 2024-12-13 PROCEDURE — 99999 PR PBB SHADOW E&M-EST. PATIENT-LVL IV: CPT | Mod: PBBFAC,,, | Performed by: INTERNAL MEDICINE

## 2024-12-13 NOTE — ASSESSMENT & PLAN NOTE
She has recent history of C difficle- I do no have the final results done at [facility]= she had severe C difficle as per patient.    She can be on PO Vanco prophylactic if she needs to  be on long term antibiotics   -she was advised to contact us too when this happens

## 2024-12-13 NOTE — PROGRESS NOTES
Subjective:       Patient ID: Mone Best is a 69 y.o. female.    Chief Complaint: Hospital Follow Up (C Diff)     HPI  69 y.o. female s/p  Resection Laparoscopy Low Anterior Colon on 09/27/2024. She was given -Neomycin and Flagyl day before surgery   CT scan of he 10/30-  1. There is a nonspecific subcutaneous fluid collection along the lower abdominal surgical wound.   2. There is moderate stranding in the left lower quadrant and left paracolic gutter consistent with the history of recent inflammation and recent surgery. I cannot detect an intra-abdominal abscess.     C difficle- 10/02- negative.  I do not have the results of the positive C difficle that she reported was done at [facility].     She completed 10 days of po Vanco in October .   She was in the Ed- 10/20/24    She was initially seen by the [place]-rectal surgery team -here and at [facility] for chronic diverticulitis and was on Augmentin/Cipro- 07/2024 and 08/2024.    She is here with multiple questions about C difficle.  She denies diarrhea at this time.   Past Medical History:   Diagnosis Date    Acquired female bladder prolapse 02/2024    Constipation, chronic     Diverticulosis     Glucose intolerance (impaired glucose tolerance) 05/03/2013    Hyperlipidemia 05/03/2013    Osteopenia     PSVT (paroxysmal supraventricular tachycardia)     Stress incontinence     Thyroid disease      Past Surgical History:   Procedure Laterality Date    APPENDECTOMY      BLADDER SUSPENSION      BREAST BIOPSY Right     fibroadenoma    BREAST BIOPSY Left 12/2023    benign    BREAST SURGERY      R breast biopsy- benign (removed fibroid adenoma)    COLONOSCOPY N/A 12/16/2022    Procedure: COLONOSCOPY;  Surgeon: Tad Adames MD;  Location: [facility];  Service: General;  Laterality: N/A;    COLONOSCOPY  09/17/2024    COLONOSCOPY W/ BIOPSIES      polyps were negative    HYSTERECTOMY      LAPAROSCOPIC LOW ANTERIOR RESECTION OF RECTUM AND SIGMOID COLON  09/27/2024     OOPHORECTOMY      TONSILLECTOMY      VAGINAL HYSTERECTOMY W/ ANTERIOR AND POSTERIOR VAGINAL REPAIR       Family History   Problem Relation Name Age of Onset    Depression Mother gi bleed     Diabetes Mother gi bleed     Diverticulitis Mother gi bleed     Diabetes Father      Hypertension Father      Diverticulitis Father      Melanoma Neg Hx       Social History     Socioeconomic History    Marital status:    Tobacco Use    Smoking status: Never     Passive exposure: Never    Smokeless tobacco: Never   Substance and Sexual Activity    Alcohol use: Yes     Alcohol/week: 3.0 standard drinks of alcohol     Types: 3 Glasses of wine per week    Drug use: No    Sexual activity: Yes     Partners: Male     Birth control/protection: See Surgical Hx     Comment: hyst     Social Drivers of Health     Financial Resource Strain: Low Risk  (2/2/2024)    Overall Financial Resource Strain (CARDIA)     Difficulty of Paying Living Expenses: Not hard at all   Food Insecurity: No Food Insecurity (10/1/2024)    Received from [employer] and Its Subsidiaries and Affiliates    Hunger Vital Sign     Worried About Running Out of Food in the Last Year: Never true     Ran Out of Food in the Last Year: Never true   Transportation Needs: No Transportation Needs (10/1/2024)    Received from [employer] and Its Subsidiaries and Affiliates    PRAPARE - Transportation     Lack of Transportation (Medical): No     Lack of Transportation (Non-Medical): No   Physical Activity: Inactive (5/16/2024)    Received from [facility]    Exercise Vital Sign     Days of Exercise per Week: 0 days     Minutes of Exercise per Session: 0 min   Stress: No Stress Concern Present (2/2/2024)    [employer] - Occupational Stress Questionnaire     Feeling of Stress : Only a little   Housing Stability: Low Risk  (10/1/2024)    Received from [employer]  [facility] and Its Subsidiaries and Affiliates    Housing Stability Vital Sign     Unable to Pay for Housing in the Last Year: No     Number of Times Moved in the Last Year: 1     Homeless in the Last Year: No     Review of Systems   Constitutional:  Negative for activity change, appetite change and chills.   HENT:  Negative for congestion.    Respiratory:  Negative for apnea and chest tightness.    Neurological:  Negative for dizziness and facial asymmetry.       Objective:      Physical Exam  Vitals and nursing note reviewed.   Constitutional:       Appearance: She is well-developed.   HENT:      Head: Normocephalic and atraumatic.   Eyes:      Conjunctiva/sclera: Conjunctivae normal.      Pupils: Pupils are equal, round, and reactive to light.   Neck:      Thyroid: No thyroid mass or thyromegaly.   Cardiovascular:      Rate and Rhythm: Normal rate.      Heart sounds: Normal heart sounds.   Pulmonary:      Effort: Pulmonary effort is normal. No accessory muscle usage or respiratory distress.      Breath sounds: Normal breath sounds.   Abdominal:      General: Bowel sounds are normal.      Palpations: Abdomen is soft. There is no mass.      Tenderness: There is no abdominal tenderness.   Musculoskeletal:         General: Normal range of motion.      Cervical back: Normal range of motion and neck supple.   Skin:     Findings: No rash.   Neurological:      Mental Status: She is alert and oriented to person, place, and time.         Assessment:       1. Clostridium difficile carrier        2. Clostridium difficile colitis  Ambulatory referral/consult to Infectious Disease      3. Night sweats  Ambulatory referral/consult to Infectious Disease      4. Moderate mixed hyperlipidemia not requiring statin therapy                Plan:       Problem List Items Addressed This Visit       Moderate mixed hyperlipidemia not requiring statin therapy (Chronic)     Follow PCP         Clostridium  difficile carrier - Primary     She has recent history of C difficle- I do no have the final results done at [facility]= she had severe C difficle as per patient.    She can be on PO Vanco prophylactic if she needs to  be on long term antibiotics   -she was advised to contact us too when this happens          Other Visit Diagnoses       Clostridium difficile colitis        Night sweats

## 2025-02-14 ENCOUNTER — PATIENT MESSAGE (OUTPATIENT)
Dept: INFECTIOUS DISEASES | Facility: CLINIC | Age: 70
End: 2025-02-14
Payer: MEDICARE

## 2025-02-24 DIAGNOSIS — J30.2 SEASONAL ALLERGIC RHINITIS, UNSPECIFIED TRIGGER: Chronic | ICD-10-CM

## 2025-02-24 RX ORDER — AZELASTINE 1 MG/ML
2 SPRAY, METERED NASAL 2 TIMES DAILY
Qty: 90 ML | Refills: 2 | Status: SHIPPED | OUTPATIENT
Start: 2025-02-24

## 2025-02-24 NOTE — TELEPHONE ENCOUNTER
No care due was identified.  [facility] Embedded Care Due Messages. Reference number: [id number].   2/24/2025 12:25:50 PM CST

## 2025-02-25 NOTE — TELEPHONE ENCOUNTER
Refill Decision Note   Mone Estephania  is requesting a refill authorization.  Brief Assessment and Rationale for Refill:  Approve     Medication Therapy Plan:        Comments:     Note composed:6:24 PM 02/24/2025

## 2025-05-02 ENCOUNTER — PATIENT MESSAGE (OUTPATIENT)
Dept: INTERNAL MEDICINE | Facility: CLINIC | Age: 70
End: 2025-05-02
Payer: MEDICARE

## 2025-05-21 ENCOUNTER — HOSPITAL ENCOUNTER (OUTPATIENT)
Dept: RADIOLOGY | Facility: HOSPITAL | Age: 70
Discharge: HOME | End: 2025-05-21
Attending: FAMILY MEDICINE
Payer: MEDICARE

## 2025-05-21 DIAGNOSIS — Z12.31 ENCOUNTER FOR SCREENING MAMMOGRAM FOR BREAST CANCER: ICD-10-CM

## 2025-05-21 PROCEDURE — 77063 BREAST TOMOSYNTHESIS BI: CPT | Mod: TC

## 2025-05-21 PROCEDURE — 77067 SCR MAMMO BI INCL CAD: CPT | Mod: 26,,, | Performed by: RADIOLOGY

## 2025-05-21 PROCEDURE — 77063 BREAST TOMOSYNTHESIS BI: CPT | Mod: 26,,, | Performed by: RADIOLOGY

## 2025-05-23 ENCOUNTER — RESULTS FOLLOW-UP (OUTPATIENT)
Dept: INTERNAL MEDICINE | Facility: CLINIC | Age: 70
End: 2025-05-23

## 2025-05-23 NOTE — PROGRESS NOTES
Mone Hinton,    I am happy to report that your recent breast imaging did not show any evidence of cancer.    An annual mammogram is the best test available for breast cancer screening, though it is not perfect and can sometimes miss cancers. Even though your mammogram was normal, please schedule an appointment with me for a proper evaluation if you notice any lump or change in one of your breasts.    Thank you for letting me care for you, and thank you for trusting [facility] with your healthcare needs.    All the best,    Dr. KELLY

## 2025-06-20 DIAGNOSIS — N95.1 MENOPAUSAL VASOMOTOR SYNDROME: Chronic | ICD-10-CM

## 2025-06-20 RX ORDER — ESTRADIOL 0.5 MG/1
0.5 TABLET ORAL
Qty: 90 TABLET | Refills: 3 | OUTPATIENT
Start: 2025-06-20

## 2025-06-20 NOTE — TELEPHONE ENCOUNTER
Care Due:                  Date            Visit Type   Department     Provider  --------------------------------------------------------------------------------                                EP -                              PRIMARY      [facility] INTERNAL  Last Visit: 11-      CARE ([facility])   MEDICINE       Mingo BETANCOURTHARJAMEY                              FOLLOWUP/OF  [facility] INTERNAL  Next Visit: 07-      FICE VISIT   MEDICINE       Mingo Plummer                                                            Last  Test          Frequency    Reason                     Performed    Due Date  --------------------------------------------------------------------------------    TSH.........  12 months..  levothyroxine,             08- 08-                             liothyronine.............    [facility] Embedded Care Due Messages. Reference number: [id number].   6/20/2025 1:33:19 PM CDT

## 2025-06-20 NOTE — TELEPHONE ENCOUNTER
No care due was identified.  [facility] Embedded Care Due Messages. Reference number: [id number].   6/20/2025 3:09:57 PM CDT

## 2025-06-21 ENCOUNTER — PATIENT MESSAGE (OUTPATIENT)
Dept: INTERNAL MEDICINE | Facility: CLINIC | Age: 70
End: 2025-06-21
Payer: MEDICARE

## 2025-06-21 RX ORDER — ESTRADIOL 0.5 MG/1
0.5 TABLET ORAL DAILY
Qty: 90 TABLET | Refills: 1 | Status: SHIPPED | OUTPATIENT
Start: 2025-06-21

## 2025-06-21 NOTE — TELEPHONE ENCOUNTER
Refill Decision Note   Mone Best  is requesting a refill authorization.  Brief Assessment and Rationale for Refill:  Quick Discontinue     Medication Therapy Plan:  fovs, flos; duplicate      Comments:     Note composed:11:56 PM 06/20/2025

## 2025-06-21 NOTE — TELEPHONE ENCOUNTER
Refill Decision Note   Mone Deelukasz  is requesting a refill authorization.  Brief Assessment and Rationale for Refill:  Approve     Medication Therapy Plan:        Pharmacist review requested: Yes   Extended chart review required: Yes   Comments:     Note composed:11:20 AM 06/21/2025

## 2025-06-21 NOTE — TELEPHONE ENCOUNTER
Refill Routing Note   Medication(s) are not appropriate for processing by [facility] for the following reason(s):        Drug-disease interaction: Drug-Disease: estradioL and Surgical menopause on hormone replacement therapy     [facility] action(s):  Defer           Pharmacist review requested: Yes     Appointments  past 12m or future 3m with PCP    Date Provider   Last Visit   11/15/2024 MARILYNN Plummer MD   Next Visit   7/17/2025 MARILYNN Plummer MD   ED visits in past 90 days: 0        Note composed:11:55 PM 06/20/2025

## 2025-06-24 ENCOUNTER — LAB VISIT (OUTPATIENT)
Dept: LAB | Facility: HOSPITAL | Age: 70
End: 2025-06-24
Attending: FAMILY MEDICINE
Payer: MEDICARE

## 2025-06-24 DIAGNOSIS — E78.2 MODERATE MIXED HYPERLIPIDEMIA NOT REQUIRING STATIN THERAPY: Chronic | ICD-10-CM

## 2025-06-24 DIAGNOSIS — E03.9 ACQUIRED HYPOTHYROIDISM: Chronic | ICD-10-CM

## 2025-06-24 LAB
ALBUMIN SERPL BCP-MCNC: 4 G/DL (ref 3.5–5.2)
ALP SERPL-CCNC: 48 UNIT/L (ref 40–150)
ALT SERPL W/O P-5'-P-CCNC: 13 UNIT/L (ref 10–44)
ANION GAP (OHS): 7 MMOL/L (ref 8–16)
AST SERPL-CCNC: 11 UNIT/L (ref 11–45)
BILIRUB SERPL-MCNC: 0.3 MG/DL (ref 0.1–1)
BUN SERPL-MCNC: 15 MG/DL (ref 8–23)
CALCIUM SERPL-MCNC: 9.1 MG/DL (ref 8.7–10.5)
CHLORIDE SERPL-SCNC: 108 MMOL/L (ref 95–110)
CHOLEST SERPL-MCNC: 192 MG/DL (ref 120–199)
CHOLEST/HDLC SERPL: 4.3 {RATIO} (ref 2–5)
CO2 SERPL-SCNC: 26 MMOL/L (ref 23–29)
CREAT SERPL-MCNC: 0.6 MG/DL (ref 0.5–1.4)
GFR SERPLBLD CREATININE-BSD FMLA CKD-EPI: >60 ML/MIN/1.73/M2
GLUCOSE SERPL-MCNC: 93 MG/DL (ref 70–110)
HDLC SERPL-MCNC: 45 MG/DL (ref 40–75)
HDLC SERPL: 23.4 % (ref 20–50)
LDLC SERPL CALC-MCNC: 119.2 MG/DL (ref 63–159)
NONHDLC SERPL-MCNC: 147 MG/DL
POTASSIUM SERPL-SCNC: 4.3 MMOL/L (ref 3.5–5.1)
PROT SERPL-MCNC: 6.5 GM/DL (ref 6–8.4)
SODIUM SERPL-SCNC: 141 MMOL/L (ref 136–145)
T4 FREE SERPL-MCNC: 0.75 NG/DL (ref 0.71–1.51)
TRIGL SERPL-MCNC: 139 MG/DL (ref 30–150)
TSH SERPL-ACNC: 1.8 UIU/ML (ref 0.4–4)

## 2025-06-24 PROCEDURE — 36415 COLL VENOUS BLD VENIPUNCTURE: CPT

## 2025-06-24 PROCEDURE — 84443 ASSAY THYROID STIM HORMONE: CPT

## 2025-06-24 PROCEDURE — 82465 ASSAY BLD/SERUM CHOLESTEROL: CPT

## 2025-06-24 PROCEDURE — 80053 COMPREHEN METABOLIC PANEL: CPT

## 2025-06-24 PROCEDURE — 84439 ASSAY OF FREE THYROXINE: CPT

## 2025-06-26 ENCOUNTER — RESULTS FOLLOW-UP (OUTPATIENT)
Dept: INTERNAL MEDICINE | Facility: CLINIC | Age: 70
End: 2025-06-26

## 2025-07-17 ENCOUNTER — HOSPITAL ENCOUNTER (OUTPATIENT)
Dept: RADIOLOGY | Facility: HOSPITAL | Age: 70
Discharge: HOME | End: 2025-07-17
Attending: FAMILY MEDICINE
Payer: MEDICARE

## 2025-07-17 ENCOUNTER — OFFICE VISIT (OUTPATIENT)
Dept: INTERNAL MEDICINE | Facility: CLINIC | Age: 70
End: 2025-07-17
Payer: MEDICARE

## 2025-07-17 VITALS
DIASTOLIC BLOOD PRESSURE: 80 MMHG | HEIGHT: 64 IN | BODY MASS INDEX: 24.1 KG/M2 | HEART RATE: 68 BPM | TEMPERATURE: 97 F | SYSTOLIC BLOOD PRESSURE: 128 MMHG | WEIGHT: 141.13 LBS | OXYGEN SATURATION: 97 %

## 2025-07-17 DIAGNOSIS — J30.2 SEASONAL ALLERGIC RHINITIS, UNSPECIFIED TRIGGER: Chronic | ICD-10-CM

## 2025-07-17 DIAGNOSIS — Z23 NEED FOR VACCINATION: ICD-10-CM

## 2025-07-17 DIAGNOSIS — I47.10 PSVT (PAROXYSMAL SUPRAVENTRICULAR TACHYCARDIA): Chronic | ICD-10-CM

## 2025-07-17 DIAGNOSIS — Z00.00 PREVENTATIVE HEALTH CARE: ICD-10-CM

## 2025-07-17 DIAGNOSIS — M25.512 ACUTE PAIN OF LEFT SHOULDER: ICD-10-CM

## 2025-07-17 DIAGNOSIS — R00.2 PALPITATIONS: Chronic | ICD-10-CM

## 2025-07-17 DIAGNOSIS — E55.9 VITAMIN D DEFICIENCY: Chronic | ICD-10-CM

## 2025-07-17 DIAGNOSIS — N95.1 MENOPAUSAL STATE: Chronic | ICD-10-CM

## 2025-07-17 DIAGNOSIS — N95.1 MENOPAUSAL VASOMOTOR SYNDROME: Chronic | ICD-10-CM

## 2025-07-17 DIAGNOSIS — E03.9 ACQUIRED HYPOTHYROIDISM: Chronic | ICD-10-CM

## 2025-07-17 DIAGNOSIS — M25.512 ACUTE PAIN OF LEFT SHOULDER: Primary | ICD-10-CM

## 2025-07-17 PROCEDURE — 99214 OFFICE O/P EST MOD 30 MIN: CPT | Mod: S$GLB,,, | Performed by: FAMILY MEDICINE

## 2025-07-17 PROCEDURE — 3074F SYST BP LT 130 MM HG: CPT | Mod: CPTII,S$GLB,, | Performed by: FAMILY MEDICINE

## 2025-07-17 PROCEDURE — 73030 X-RAY EXAM OF SHOULDER: CPT | Mod: 26,LT,, | Performed by: RADIOLOGY

## 2025-07-17 PROCEDURE — 1101F PT FALLS ASSESS-DOCD LE1/YR: CPT | Mod: CPTII,S$GLB,, | Performed by: FAMILY MEDICINE

## 2025-07-17 PROCEDURE — 99999 PR PBB SHADOW E&M-EST. PATIENT-LVL V: CPT | Mod: PBBFAC,,, | Performed by: FAMILY MEDICINE

## 2025-07-17 PROCEDURE — 73030 X-RAY EXAM OF SHOULDER: CPT | Mod: TC,LT

## 2025-07-17 PROCEDURE — 1159F MED LIST DOCD IN RCRD: CPT | Mod: CPTII,S$GLB,, | Performed by: FAMILY MEDICINE

## 2025-07-17 PROCEDURE — 3008F BODY MASS INDEX DOCD: CPT | Mod: CPTII,S$GLB,, | Performed by: FAMILY MEDICINE

## 2025-07-17 PROCEDURE — 3079F DIAST BP 80-89 MM HG: CPT | Mod: CPTII,S$GLB,, | Performed by: FAMILY MEDICINE

## 2025-07-17 PROCEDURE — 1160F RVW MEDS BY RX/DR IN RCRD: CPT | Mod: CPTII,S$GLB,, | Performed by: FAMILY MEDICINE

## 2025-07-17 PROCEDURE — G2211 COMPLEX E/M VISIT ADD ON: HCPCS | Mod: S$GLB,,, | Performed by: FAMILY MEDICINE

## 2025-07-17 PROCEDURE — 3288F FALL RISK ASSESSMENT DOCD: CPT | Mod: CPTII,S$GLB,, | Performed by: FAMILY MEDICINE

## 2025-07-17 PROCEDURE — 1125F AMNT PAIN NOTED PAIN PRSNT: CPT | Mod: CPTII,S$GLB,, | Performed by: FAMILY MEDICINE

## 2025-07-17 RX ORDER — LEVOTHYROXINE SODIUM 50 UG/1
50 TABLET ORAL
Qty: 90 TABLET | Refills: 3 | Status: SHIPPED | OUTPATIENT
Start: 2025-07-17

## 2025-07-17 RX ORDER — MULTIVITAMIN
1 TABLET ORAL 2 TIMES DAILY
COMMUNITY
Start: 2025-07-17

## 2025-07-17 RX ORDER — PROPRANOLOL HYDROCHLORIDE 20 MG/1
20 TABLET ORAL 2 TIMES DAILY PRN
Qty: 90 TABLET | Refills: 3 | Status: SHIPPED | OUTPATIENT
Start: 2025-07-17

## 2025-07-17 RX ORDER — FLUTICASONE PROPIONATE 50 MCG
2 SPRAY, SUSPENSION (ML) NASAL DAILY
COMMUNITY
Start: 2025-07-17 | End: 2026-07-17

## 2025-07-17 RX ORDER — LIOTHYRONINE SODIUM 5 UG/1
5 TABLET ORAL 2 TIMES DAILY
Qty: 180 TABLET | Refills: 3 | Status: SHIPPED | OUTPATIENT
Start: 2025-07-17

## 2025-07-17 RX ORDER — ESTRADIOL 0.5 MG/1
0.5 TABLET ORAL DAILY
Qty: 90 TABLET | Refills: 3 | Status: SHIPPED | OUTPATIENT
Start: 2025-07-17

## 2025-07-17 RX ORDER — AZELASTINE 1 MG/ML
2 SPRAY, METERED NASAL 2 TIMES DAILY
Qty: 60 ML | Refills: 3 | Status: SHIPPED | OUTPATIENT
Start: 2025-07-17

## 2025-07-17 RX ORDER — TROSPIUM CHLORIDE 20 MG/1
TABLET, FILM COATED ORAL
COMMUNITY
Start: 2025-04-28

## 2025-07-17 RX ORDER — MONTELUKAST SODIUM 10 MG/1
10 TABLET ORAL NIGHTLY
Qty: 90 TABLET | Refills: 3 | Status: SHIPPED | OUTPATIENT
Start: 2025-07-17 | End: 2028-04-11

## 2025-07-17 RX ORDER — ESTRADIOL AND NORETHINDRONE ACETATE .5; .1 MG/1; MG/1
1 TABLET ORAL EVERY MORNING
COMMUNITY

## 2025-07-17 NOTE — PROGRESS NOTES
OFFICE VISIT 7/17/25  1:00 PM CDT    CHIEF COMPLAINT: Annual Exam    SUMMARY: Acute left shoulder pain likely due to arthritis with XR and ortho referral ordered; seasonal allergic rhinitis management adjusted with Astelin and Singulair discontinued, Flonase continued; menopausal vasomotor symptoms stable on estradiol; hypothyroidism stable with normal TSH, continued Synthroid and Cytomel; benign palpitations and PSVT stable, propranolol as needed; vitamin D deficiency and menopausal state addressed with calcium-vitamin D; preventative care and RSV vaccination addressed.    Acute pain of left shoulder: She reported one month of left shoulder pain radiating to the surrounding muscle and back, worsened by sleeping on the affected side and backward movement, with full range of motion but pain on certain movements. Exam showed pain with posterior movement and no imaging had been done prior. Family history of arthritis was noted. I assessed the pain as likely due to arthritis. Vital signs on 7/17/25 were /80, pulse 68, weight 64 kg, BMI 24.22. I ordered XR left shoulder and referred her to orthopedics for further evaluation and possible cortisone injection. She was advised to continue -guided exercises and to follow up with ortho after imaging.    Seasonal allergic rhinitis, unspecified trigger: She described ongoing symptoms and had been using OTC Flonase and previously prescribed Astelin and Singulair. I discontinued Astelin and instructed her to discontinue Singulair after a few weeks if no issues arose, continuing Flonase. I counseled her that less medication is generally preferable for allergy management and advised her to contact the office if symptoms worsen after these changes.    Vitamin D deficiency: She was continued on calcium-vitamin D (Calcium with Vitamin D) 600 mg-10 mcg (400 unit) tablet twice daily. Most recent calcium on 6/24/25 was 9.1. No recent vitamin D levels were  provided.    Menopausal state: She was continued on calcium-vitamin D (Calcium with Vitamin D) 600 mg-10 mcg (400 unit) tablet twice daily for bone health.    Menopausal vasomotor syndrome: She continued to report hot flashes, less severe than during her prior C. diff infection, and continued estradiol (Estrace) 0.5 mg tablet daily. She was uncertain about its effectiveness but continued therapy.    Acquired hypothyroidism: Thyroid labs were reviewed: TSH 1.797 (6/24/25), free T4 0.75 (6/24/25), prior TSH and T4 values remained within goal. Exam showed normal thyroid. She was continued on levothyroxine (Synthroid) 50 mcg daily before breakfast and liothyronine (Cytomel) 5 mcg twice daily.    Benign palpitations: She reported taking propranolol (Inderal) 20 mg as needed, typically about once weekly, for palpitations. Vitals and exam were unremarkable. I continued propranolol as needed.    PSVT (paroxysmal supraventricular tachycardia): She used propranolol as needed for palpitations, with no recent exacerbations. Vitals were stable. No changes were made.    Preventative health care: I ordered an ambulatory referral/consult to Enhanced Annual Wellness Visit (eAWV) and reviewed vitals and recent weight trends. I discussed RSV vaccination for lung infection prevention.    Follow up with me in about one year for annual exam. She was instructed to follow up with orthopedics after left shoulder XR, and to contact my office if allergy symptoms worsen after medication changes. An appointment with Gisela Perry NP in orthopedics is scheduled for 8/20/2025.      1. Acute pain of left shoulder  -     X-Ray Shoulder 2 or More Views Left; Future; Expected date: 07/17/2025  -     Ambulatory referral/consult to Orthopedics; Future; Expected date: 07/24/2025    2. Seasonal allergic rhinitis, unspecified trigger  -     azelastine (ASTELIN) 137 mcg (0.1 %) nasal spray; 2 sprays (274 mcg total) by Nasal route 2 (two) times  "daily.  Dispense: 60 mL; Refill: 3  -     montelukast (SINGULAIR) 10 mg tablet; Take 1 tablet (10 mg total) by mouth every evening.  Dispense: 90 tablet; Refill: 3  -     fluticasone propionate (FLONASE) 50 mcg/actuation nasal spray; 2 sprays (100 mcg total) by Each Nostril route once daily.    3. Vitamin D deficiency  -     calcium-vitamin D (CALCIUM WITH VITAMIN D) 600 mg-10 mcg (400 unit) Tab; Take 1 tablet by mouth 2 (two) times a day.    4. Menopausal state  -     calcium-vitamin D (CALCIUM WITH VITAMIN D) 600 mg-10 mcg (400 unit) Tab; Take 1 tablet by mouth 2 (two) times a day.    5. Menopausal vasomotor syndrome  -     estradioL (ESTRACE) 0.5 MG tablet; Take 1 tablet (0.5 mg total) by mouth once daily.  Dispense: 90 tablet; Refill: 3    6. Acquired hypothyroidism  Assessment & Plan:  Lab Results   Component Value Date    TSH 1.797 06/24/2025    TSH 1.150 08/26/2024    TSH 1.461 02/06/2024    FREET4 0.75 06/24/2025    FREET4 0.78 08/26/2024    FREET4 0.96 08/29/2023    E4OYEAX 105 08/26/2024    I4ITGKT 127 08/29/2023    K8GKDZL 82.66 05/03/2013    T3FREE 2.9 09/23/2020     No results found for: "THYROIDSTIMI", "THYROPEROXID", "THYGLBTUM", "THGABSCRN", "THYRGINTERP"     Orders:  -     levothyroxine (SYNTHROID) 50 MCG tablet; Take 1 tablet (50 mcg total) by mouth before breakfast.  Dispense: 90 tablet; Refill: 3  -     liothyronine (CYTOMEL) 5 MCG Tab; Take 1 tablet (5 mcg total) by mouth 2 (two) times a day.  Dispense: 180 tablet; Refill: 3    7. Benign palpitations  -     propranoloL (INDERAL) 20 MG tablet; Take 1 tablet (20 mg total) by mouth 2 (two) times daily as needed (for heart palpitaitons).  Dispense: 90 tablet; Refill: 3    8. PSVT (paroxysmal supraventricular tachycardia)  -     propranoloL (INDERAL) 20 MG tablet; Take 1 tablet (20 mg total) by mouth 2 (two) times daily as needed (for heart palpitaitons).  Dispense: 90 tablet; Refill: 3    9. Need for vaccination  -     RSVPreF3 antigen-[employer]ESS PYATON, " "(AREXVY) 120 mcg/0.5 mL SusR vaccine; Pharmacist inject 0.5 mL intramuscular for one dose according to CDC guidance and pharmacy protocol.  Dispense: 0.5 mL; Refill: 0    10. [facility] health care  -     Ambulatory Referral/Consult to Enhanced Annual Wellness Visit (eAWV); Standing         Unless specified otherwise, chronic conditions are represented as and appear to be compensated/controlled and stable.  Today's visit involved the intricate management of episodic problem(s) and the ongoing care for the patient's serious or complex condition(s) listed above, reflecting the inherent complexity of providing longitudinal, comprehensive evaluation and management as the central hub for the patient's primary care services.    Except as noted herein, ROS is otherwise negative.    Vitals:    07/17/25 1304   BP: 128/80   BP Location: Left arm   Patient Position: Sitting   Pulse: 68   Temp: 97.1 °F (36.2 °C)   TempSrc: Tympanic   SpO2: 97%   Weight: 64 kg (141 lb 1.5 oz)   Height: 5' 4" (1.626 m)   Physical Exam  Vitals reviewed.   Constitutional:       General: She is not in acute distress.     Appearance: Normal appearance. She is not ill-appearing, toxic-appearing or diaphoretic.   HENT:      Head: Normocephalic and atraumatic.   Eyes:      General: No scleral icterus.     Conjunctiva/sclera: Conjunctivae normal.   Neck:      Thyroid: No thyroid mass, thyromegaly or thyroid tenderness.      Vascular: No carotid bruit.   Cardiovascular:      Rate and Rhythm: Normal rate and regular rhythm.   Pulmonary:      Effort: Pulmonary effort is normal.      Breath sounds: Normal breath sounds.   Musculoskeletal:         General: Tenderness present. No swelling or deformity. Normal range of motion.   Skin:     General: Skin is warm and dry.   Neurological:      Mental Status: She is alert and oriented to person, place, and time. Mental status is at baseline.   Psychiatric:         Mood and Affect: Mood normal.         Behavior: " Behavior normal.         Judgment: Judgment normal.       Documentation entered by me for this encounter may have been done in part using ambient-listening and speech-recognition technologies. I have reviewed the content for accuracy, though errors in syntax, spelling, or similar-sounding words may be present and should be interpreted in context. Please contact the author for any clarification.     Follow up in about 1 year (around 7/17/2026) for annual exam.

## 2025-07-17 NOTE — ASSESSMENT & PLAN NOTE
"Lab Results   Component Value Date    TSH 1.797 06/24/2025    TSH 1.150 08/26/2024    TSH 1.461 02/06/2024    FREET4 0.75 06/24/2025    FREET4 0.78 08/26/2024    FREET4 0.96 08/29/2023    A2AENIH 105 08/26/2024    Z3MGEFJ 127 08/29/2023    B6NALUF 82.66 05/03/2013    T3FREE 2.9 09/23/2020     No results found for: "THYROIDSTIMI", "THYROPEROXID", "THYGLBTUM", "THGABSCRN", "THYRGINTERP"   "

## 2025-08-07 DIAGNOSIS — E03.9 ACQUIRED HYPOTHYROIDISM: Chronic | ICD-10-CM

## 2025-08-08 RX ORDER — LIOTHYRONINE SODIUM 5 UG/1
5 TABLET ORAL 2 TIMES DAILY
Qty: 180 TABLET | Refills: 3 | Status: SHIPPED | OUTPATIENT
Start: 2025-08-08

## 2025-08-08 RX ORDER — LEVOTHYROXINE SODIUM 50 UG/1
50 TABLET ORAL
Qty: 90 TABLET | Refills: 3 | Status: SHIPPED | OUTPATIENT
Start: 2025-08-08

## 2025-08-18 DIAGNOSIS — M54.12 CERVICAL RADICULAR PAIN: Primary | ICD-10-CM

## 2025-08-18 PROBLEM — M25.512 ACUTE PAIN OF LEFT SHOULDER: Status: ACTIVE | Noted: 2025-08-18

## 2025-08-20 ENCOUNTER — OFFICE VISIT (OUTPATIENT)
Dept: ORTHOPEDICS | Facility: CLINIC | Age: 70
End: 2025-08-20
Payer: MEDICARE

## 2025-08-20 ENCOUNTER — HOSPITAL ENCOUNTER (OUTPATIENT)
Dept: RADIOLOGY | Facility: HOSPITAL | Age: 70
Discharge: HOME | End: 2025-08-20
Attending: NURSE PRACTITIONER
Payer: MEDICARE

## 2025-08-20 VITALS — HEIGHT: 64 IN | BODY MASS INDEX: 23.98 KG/M2 | WEIGHT: 140.44 LBS

## 2025-08-20 DIAGNOSIS — M41.119 JUVENILE IDIOPATHIC SCOLIOSIS, UNSPECIFIED SPINAL REGION: ICD-10-CM

## 2025-08-20 DIAGNOSIS — M54.12 CERVICAL RADICULAR PAIN: ICD-10-CM

## 2025-08-20 DIAGNOSIS — M54.12 CERVICAL RADICULAR PAIN: Primary | ICD-10-CM

## 2025-08-20 DIAGNOSIS — M50.30 DDD (DEGENERATIVE DISC DISEASE), CERVICAL: ICD-10-CM

## 2025-08-20 PROCEDURE — 72040 X-RAY EXAM NECK SPINE 2-3 VW: CPT | Mod: 26,,, | Performed by: RADIOLOGY

## 2025-08-20 PROCEDURE — 99999 PR PBB SHADOW E&M-EST. PATIENT-LVL V: CPT | Mod: PBBFAC,,, | Performed by: NURSE PRACTITIONER

## 2025-08-20 PROCEDURE — 72040 X-RAY EXAM NECK SPINE 2-3 VW: CPT | Mod: TC,PO

## 2025-08-20 RX ORDER — METHOCARBAMOL 500 MG/1
500 TABLET, FILM COATED ORAL
Qty: 60 TABLET | Refills: 1 | Status: SHIPPED | OUTPATIENT
Start: 2025-08-20

## 2025-08-20 RX ORDER — NAPROXEN 500 MG/1
500 TABLET ORAL 2 TIMES DAILY
Qty: 60 TABLET | Refills: 1 | Status: SHIPPED | OUTPATIENT
Start: 2025-08-20